# Patient Record
Sex: FEMALE | Race: WHITE | NOT HISPANIC OR LATINO | Employment: OTHER | URBAN - METROPOLITAN AREA
[De-identification: names, ages, dates, MRNs, and addresses within clinical notes are randomized per-mention and may not be internally consistent; named-entity substitution may affect disease eponyms.]

---

## 2017-01-18 ENCOUNTER — HOSPITAL ENCOUNTER (OUTPATIENT)
Dept: RADIOLOGY | Facility: MEDICAL CENTER | Age: 77
End: 2017-01-18
Attending: INTERNAL MEDICINE
Payer: MEDICARE

## 2017-01-18 DIAGNOSIS — R92.8 ABNORMAL MAMMOGRAM OF LEFT BREAST: ICD-10-CM

## 2017-01-18 PROCEDURE — G0206 DX MAMMO INCL CAD UNI: HCPCS | Mod: LT

## 2017-01-30 ENCOUNTER — OFFICE VISIT (OUTPATIENT)
Dept: MEDICAL GROUP | Facility: MEDICAL CENTER | Age: 77
End: 2017-01-30
Payer: MEDICARE

## 2017-01-30 VITALS
HEIGHT: 64 IN | WEIGHT: 143 LBS | DIASTOLIC BLOOD PRESSURE: 88 MMHG | BODY MASS INDEX: 24.41 KG/M2 | SYSTOLIC BLOOD PRESSURE: 134 MMHG | RESPIRATION RATE: 16 BRPM | HEART RATE: 69 BPM | OXYGEN SATURATION: 94 % | TEMPERATURE: 98.2 F

## 2017-01-30 DIAGNOSIS — J41.0 SIMPLE CHRONIC BRONCHITIS (HCC): ICD-10-CM

## 2017-01-30 DIAGNOSIS — M85.80 OSTEOPENIA: ICD-10-CM

## 2017-01-30 DIAGNOSIS — E55.9 VITAMIN D DEFICIENCY: ICD-10-CM

## 2017-01-30 PROCEDURE — 99213 OFFICE O/P EST LOW 20 MIN: CPT | Performed by: INTERNAL MEDICINE

## 2017-01-30 PROCEDURE — 1101F PT FALLS ASSESS-DOCD LE1/YR: CPT | Performed by: INTERNAL MEDICINE

## 2017-01-30 PROCEDURE — 4040F PNEUMOC VAC/ADMIN/RCVD: CPT | Mod: 8P | Performed by: INTERNAL MEDICINE

## 2017-01-30 PROCEDURE — G8420 CALC BMI NORM PARAMETERS: HCPCS | Performed by: INTERNAL MEDICINE

## 2017-01-30 PROCEDURE — G8484 FLU IMMUNIZE NO ADMIN: HCPCS | Performed by: INTERNAL MEDICINE

## 2017-01-30 PROCEDURE — 4004F PT TOBACCO SCREEN RCVD TLK: CPT | Performed by: INTERNAL MEDICINE

## 2017-01-30 PROCEDURE — G8432 DEP SCR NOT DOC, RNG: HCPCS | Performed by: INTERNAL MEDICINE

## 2017-01-30 RX ORDER — SIMVASTATIN 40 MG
TABLET ORAL
Qty: 90 TAB | Refills: 3 | Status: SHIPPED | OUTPATIENT
Start: 2017-01-30 | End: 2018-05-14 | Stop reason: SDUPTHER

## 2017-01-30 NOTE — MR AVS SNAPSHOT
"        Cheli Parks   2017 10:40 AM   Office Visit   MRN: [id number]    Department:  [address] [place] [address]   Dept Phone:  [phone]    Description:  Female : 1940   Provider:  Javier Slaughter M.D.           Reason for Visit     Follow-Up 6 month check up. Dexa scan results      Allergies as of 2017     Allergen Noted Reactions    Nkda [No Known Drug Allergy] 2009         You were diagnosed with     Osteopenia   [361403]       Simple chronic bronchitis (CMS-HCC)   [491.0.ICD-9-CM]       Vitamin D deficiency   [1144280]         Vital Signs     Blood Pressure Pulse Temperature Respirations Height Weight    134/88 mmHg 69 36.8 °C (98.2 °F) 16 1.626 m (5' 4.02\") 64.864 kg (143 lb)    Body Mass Index Oxygen Saturation Smoking Status             24.53 kg/m2 94% Current Every Day Smoker         Basic Information     Date Of Birth Sex Race Ethnicity Preferred Language    1940 Female White Non- English      Your appointments     May 01, 2017 10:40 AM   NEW TO YOU with Danny Buchanan M.D.   [facility] [address]    [address] [facility] [address]  [facility] [id number] [phone]              Problem List              ICD-10-CM Priority Class Noted - Resolved    CATARACT    Unknown - Present    Tobacco abuse Z72.0   Unknown - Present    Hyperlipidemia E78.5   Unknown - Present    COPD (chronic obstructive pulmonary disease) (CMS-HCC) J44.9   2010 - Present    MEDICAL HOME    2012 - Present    Osteopenia M85.80   2014 - Present    Hyperglycemia R73.9   9/3/2015 - Present    Vitamin D deficiency E55.9   2015 - Present      Health Maintenance        Date Due Completion Dates    IMM DTaP/Tdap/Td Vaccine (1 - Tdap) 1958 ---    IMM ZOSTER VACCINE 1999 ---    IMM PNEUMOCOCCAL 65+ (ADULT) LOW/MEDIUM RISK SERIES (1 of 2 - PCV13) 2004 ---    IMM INFLUENZA (1) 2016 ---    MAMMOGRAM 2018, 2016, 2015, 3/11/2013, " 1/14/2011, 10/19/2009, 10/19/2009    BONE DENSITY 12/19/2021 12/19/2016, 12/12/2013, 8/9/2011    COLONOSCOPY 2/26/2023 2/26/2013 (Declined)    Override on 2/26/2013: Patient Declined            Current Immunizations     No immunizations on file.      Below and/or attached are the medications your provider expects you to take. Review all of your home medications and newly ordered medications with your provider and/or pharmacist. Follow medication instructions as directed by your provider and/or pharmacist. Please keep your medication list with you and share with your provider. Update the information when medications are discontinued, doses are changed, or new medications (including over-the-counter products) are added; and carry medication information at all times in the event of emergency situations     Allergies:  NKDA - (reactions not documented)               Medications  Valid as of: January 30, 2017 - 11:09 AM    Generic Name Brand Name Tablet Size Instructions for use    Calcium Carbonate-Vitamin D (Chew Tab) Calcium Carbonate-Vitamin D 600-400 MG-UNIT Take 1 Tab by mouth 2 Times a Day.          Cholecalciferol (Tab) cholecalciferol 1000 UNIT Take 1,000 Units by mouth every day.        Ibuprofen (Tab) MOTRIN 800 MG Take 800 mg by mouth every 8 hours as needed.        Simvastatin (Tab) ZOCOR 40 MG TAKE 1 TABLET BY MOUTH ONCE DAILY        .                 Medicines prescribed today were sent to:     [facility] [address] [place] [address] [address] [facility]    [address] [place] [address]    Phone: [phone] Fax: [phone]    Open 24 Hours?: No      Medication refill instructions:       If your prescription bottle indicates you have medication refills left, it is not necessary to call your provider’s office. Please contact your pharmacy and they will refill your medication.    If your prescription bottle indicates you do not have any refills left, you may request refills at any time through one of the following  ways: The online [facility] system (except Urgent Care), by calling your provider’s office, or by asking your pharmacy to contact your provider’s office with a refill request. Medication refills are processed only during regular business hours and may not be available until the next business day. Your provider may request additional information or to have a follow-up visit with you prior to refilling your medication.   *Please Note: Medication refills are assigned a new Rx number when refilled electronically. Your pharmacy may indicate that no refills were authorized even though a new prescription for the same medication is available at the pharmacy. Please request the medicine by name with the pharmacy before contacting your provider for a refill.        Your To Do List     Future Labs/Procedures Complete By Expires    VITAMIN D,25 HYDROXY  As directed 8/1/2017         [facility] Status: Patient Declined

## 2017-01-30 NOTE — PROGRESS NOTES
"Cheli is a 76 y.o. female who is here today because    Chief Complaint   Patient presents with   • Follow-Up     6 month check up. Dexa scan results       The patient's current problem list and medication list is:    Patient Active Problem List    Diagnosis Date Noted   • Vitamin D deficiency 12/23/2015   • Hyperglycemia 09/03/2015   • Osteopenia 05/28/2014   • MEDICAL HOME 11/09/2012   • COPD (chronic obstructive pulmonary disease) (CMS-HCC) 06/22/2010   • CATARACT    • Tobacco abuse    • Hyperlipidemia        Current Outpatient Prescriptions   Medication Sig Dispense Refill   • simvastatin (ZOCOR) 40 MG Tab TAKE 1 TABLET BY MOUTH ONCE DAILY 90 Tab 3   • vitamin D (CHOLECALCIFEROL) 1000 UNIT Tab Take 1,000 Units by mouth every day.     • ibuprofen (MOTRIN) 800 MG Tab Take 800 mg by mouth every 8 hours as needed.     • Calcium Carbonate-Vitamin D (CALTRATE 600+D) 600-400 MG-UNIT CHEW Take 1 Tab by mouth 2 Times a Day.         No current facility-administered medications for this visit.     HPI  Cheli returns after 3 months for follow up. Diagnoses of Osteopenia, Simple chronic bronchitis (CMS-HCC), and Vitamin D deficiency were pertinent to this visit.    1. Osteopenia  No falls, no fractures. Takes care in walking.     2. Simple chronic bronchitis (CMS-[facility])  Still smoking. Has am cough and sputum but these do not continue during the day. Reports no GRULLON, no hemoptysis. Decline chest CT cancer screening program. Not interested in having PFTs.     3. Vitamin D deficiency  Last vitamin D level was 28 in 2015. She is now taking 1400 vitamin D units bid.       ROS Denies chest pain, shortness of breath, changes in bowel and bladder function, lower extremity edema.    Allergies as of 01/30/2017 - Joss as Reviewed 01/30/2017   Allergen Reaction Noted   • Nkda [no known drug allergy]  09/22/2009      /88 mmHg  Pulse 69  Temp(Src) 36.8 °C (98.2 °F)  Resp 16  Ht 1.626 m (5' 4.02\")  Wt 64.864 kg (143 lb)  BMI 24.53 " kg/m2  SpO2 94%    PHYSICAL EXAMINATION  Gen:         Alert and oriented, No apparent distress.  Neck:       No Jugular venous distension, Lymphadenopathy, Thyromegaly, Bruits.  Lungs:     Clear to auscultation bilaterally  CV:          Regular rate and rhythm. No murmurs heard  Abd:         Soft, non distended.                    Ext:          No clubbing, cyanosis, edema.    ASSESSMENT AND PLAN     76 y.o. female     1. Osteopenia  Stable. Continue with vitamin D and calcium supplements    2. Simple chronic bronchitis (CMS-HCC)  Encouraged to consider smoking cessation    3. Vitamin D deficiency  Continue with current supplement  - VITAMIN D,25 HYDROXY; Future      Followup: 3 months, Dr. Buchanan

## 2017-04-11 ENCOUNTER — OFFICE VISIT (OUTPATIENT)
Dept: URGENT CARE | Facility: CLINIC | Age: 77
End: 2017-04-11
Payer: MEDICARE

## 2017-04-11 VITALS
RESPIRATION RATE: 16 BRPM | TEMPERATURE: 98.6 F | OXYGEN SATURATION: 96 % | HEART RATE: 86 BPM | WEIGHT: 143 LBS | DIASTOLIC BLOOD PRESSURE: 90 MMHG | SYSTOLIC BLOOD PRESSURE: 160 MMHG | BODY MASS INDEX: 24.41 KG/M2 | HEIGHT: 64 IN

## 2017-04-11 DIAGNOSIS — J44.1 COPD EXACERBATION (HCC): ICD-10-CM

## 2017-04-11 PROCEDURE — 94640 AIRWAY INHALATION TREATMENT: CPT | Performed by: PHYSICIAN ASSISTANT

## 2017-04-11 PROCEDURE — 4004F PT TOBACCO SCREEN RCVD TLK: CPT | Performed by: PHYSICIAN ASSISTANT

## 2017-04-11 PROCEDURE — G8420 CALC BMI NORM PARAMETERS: HCPCS | Performed by: PHYSICIAN ASSISTANT

## 2017-04-11 PROCEDURE — 99214 OFFICE O/P EST MOD 30 MIN: CPT | Mod: 25 | Performed by: PHYSICIAN ASSISTANT

## 2017-04-11 PROCEDURE — 4040F PNEUMOC VAC/ADMIN/RCVD: CPT | Mod: 8P | Performed by: PHYSICIAN ASSISTANT

## 2017-04-11 PROCEDURE — 1101F PT FALLS ASSESS-DOCD LE1/YR: CPT | Performed by: PHYSICIAN ASSISTANT

## 2017-04-11 PROCEDURE — G8432 DEP SCR NOT DOC, RNG: HCPCS | Performed by: PHYSICIAN ASSISTANT

## 2017-04-11 RX ORDER — PROMETHAZINE HYDROCHLORIDE AND CODEINE PHOSPHATE 6.25; 1 MG/5ML; MG/5ML
5 SYRUP ORAL 4 TIMES DAILY PRN
Qty: 120 ML | Refills: 0 | Status: SHIPPED | OUTPATIENT
Start: 2017-04-11 | End: 2017-04-18

## 2017-04-11 RX ORDER — ALBUTEROL SULFATE 2.5 MG/3ML
2.5 SOLUTION RESPIRATORY (INHALATION) ONCE
Status: COMPLETED | OUTPATIENT
Start: 2017-04-11 | End: 2017-04-11

## 2017-04-11 RX ORDER — ALBUTEROL SULFATE 90 UG/1
2 AEROSOL, METERED RESPIRATORY (INHALATION) EVERY 4 HOURS PRN
Qty: 1 INHALER | Refills: 0 | Status: SHIPPED | OUTPATIENT
Start: 2017-04-11 | End: 2017-05-30 | Stop reason: SDUPTHER

## 2017-04-11 RX ORDER — AZITHROMYCIN 250 MG/1
TABLET, FILM COATED ORAL
Qty: 6 TAB | Refills: 0 | Status: SHIPPED | OUTPATIENT
Start: 2017-04-11 | End: 2017-04-20

## 2017-04-11 RX ADMIN — ALBUTEROL SULFATE 2.5 MG: 2.5 SOLUTION RESPIRATORY (INHALATION) at 10:36

## 2017-04-11 ASSESSMENT — ENCOUNTER SYMPTOMS
SHORTNESS OF BREATH: 1
DIZZINESS: 0
COUGH: 1
EYES NEGATIVE: 1
SPUTUM PRODUCTION: 1
CARDIOVASCULAR NEGATIVE: 1
HEADACHES: 1
BACK PAIN: 1
PSYCHIATRIC NEGATIVE: 1
VOMITING: 0
WHEEZING: 1
NAUSEA: 0

## 2017-04-11 NOTE — MR AVS SNAPSHOT
"        Cheli Parks   2017 9:30 AM   Office Visit   MRN: [id number]    Department:  [facility]   Dept Phone:  [phone]    Description:  Female : 1940   Provider:  Ulysses Monteiro PA-C           Reason for Visit     Cough x 3 days/very bad cough/body ache      Allergies as of 2017     Allergen Noted Reactions    Nkda [No Known Drug Allergy] 2009         You were diagnosed with     COPD exacerbation (CMS-HCC)   [477361]         Vital Signs     Blood Pressure Pulse Temperature Respirations Height Weight    160/90 mmHg 86 37 °C (98.6 °F) 16 1.626 m (5' 4.02\") 64.864 kg (143 lb)    Body Mass Index Oxygen Saturation Breastfeeding? Smoking Status          24.53 kg/m2 96% No Current Every Day Smoker        Basic Information     Date Of Birth Sex Race Ethnicity Preferred Language    1940 Female White Non- English      Your appointments     May 01, 2017 10:40 AM   NEW TO YOU with Danny Buchanan M.D.   [facility] [address]    [address]  [facility] [address]  [facility] [address]   [phone]              Problem List              ICD-10-CM Priority Class Noted - Resolved    CATARACT    Unknown - Present    Tobacco abuse Z72.0   Unknown - Present    Hyperlipidemia E78.5   Unknown - Present    COPD (chronic obstructive pulmonary disease) (CMS-HCC) J44.9   2010 - Present    MEDICAL HOME    2012 - Present    Osteopenia M85.80   2014 - Present    Hyperglycemia R73.9   9/3/2015 - Present    Vitamin D deficiency E55.9   2015 - Present      Health Maintenance        Date Due Completion Dates    IMM DTaP/Tdap/Td Vaccine (1 - Tdap) 1958 ---    IMM ZOSTER VACCINE 1999 ---    IMM PNEUMOCOCCAL 65+ (ADULT) LOW/MEDIUM RISK SERIES (1 of 2 - PCV13) 2004 ---    MAMMOGRAM 2018, 2016, 2015, 3/11/2013, 2011, 10/19/2009, 10/19/2009    BONE DENSITY 2021, 2013, 2011    COLONOSCOPY " 2/26/2023 2/26/2013 (Declined)    Override on 2/26/2013: Patient Declined            Current Immunizations     No immunizations on file.      Below and/or attached are the medications your provider expects you to take. Review all of your home medications and newly ordered medications with your provider and/or pharmacist. Follow medication instructions as directed by your provider and/or pharmacist. Please keep your medication list with you and share with your provider. Update the information when medications are discontinued, doses are changed, or new medications (including over-the-counter products) are added; and carry medication information at all times in the event of emergency situations     Allergies:  NKDA - (reactions not documented)               Medications  Valid as of: April 11, 2017 - 11:19 AM    Generic Name Brand Name Tablet Size Instructions for use    Albuterol Sulfate (Aero Soln) albuterol 108 (90 BASE) MCG/ACT Inhale 2 Puffs by mouth every four hours as needed for Shortness of Breath.        Azithromycin (Tab) ZITHROMAX 250 MG 2 tablets on day one, then 1 tablet PO daily until done.        Calcium Carbonate-Vitamin D (Chew Tab) Calcium Carbonate-Vitamin D 600-400 MG-UNIT Take 1 Tab by mouth 2 Times a Day.          Cholecalciferol (Tab) cholecalciferol 1000 UNIT Take 1,000 Units by mouth every day.        Ibuprofen (Tab) MOTRIN 800 MG Take 800 mg by mouth every 8 hours as needed.        Promethazine-Codeine (Syrup) PHENERGAN-CODEINE 6.25-10 MG/5ML Take 5 mL by mouth 4 times a day as needed for Cough for up to 7 days.        Simvastatin (Tab) ZOCOR 40 MG TAKE 1 TABLET BY MOUTH ONCE DAILY        .                 Medicines prescribed today were sent to:     [facility] [address] [place] [address] [place] - [address]    [address] [facility] [place] [address]    Phone: [phone] Fax: [phone]    Open 24 Hours?: No      Medication refill instructions:       If your prescription bottle indicates you have medication  refills left, it is not necessary to call your provider’s office. Please contact your pharmacy and they will refill your medication.    If your prescription bottle indicates you do not have any refills left, you may request refills at any time through one of the following ways: The online [employer] system (except Urgent Care), by calling your provider’s office, or by asking your pharmacy to contact your provider’s office with a refill request. Medication refills are processed only during regular business hours and may not be available until the next business day. Your provider may request additional information or to have a follow-up visit with you prior to refilling your medication.   *Please Note: Medication refills are assigned a new Rx number when refilled electronically. Your pharmacy may indicate that no refills were authorized even though a new prescription for the same medication is available at the pharmacy. Please request the medicine by name with the pharmacy before contacting your provider for a refill.        Instructions    Nasal saline irrigation (netti pot or Toño Med Sinus Rinse).  Used distilled water or boiled tap water with nasal flushes, not straight tap water.  Humidifier at bedtime.  Hot steam showers to loosen up mucous.  Cough medicine at bedtime.  Lots of fluids, tea with honey.  Ibuprofen for headache, fever, chills.  Be sure to take with food.  Return if worsening: Yellow thicker mucus changes, worsening pain around the eyes and radiates to the teeth, and fever over 101°F.    Chronic Obstructive Pulmonary Disease Exacerbation  Chronic obstructive pulmonary disease (COPD) is a common lung condition in which airflow from the lungs is limited. COPD is a general term that can be used to describe many different lung problems that limit airflow, including chronic bronchitis and emphysema. COPD exacerbations are episodes when breathing symptoms become much worse and require extra treatment.  Without treatment, COPD exacerbations can be life threatening, and frequent COPD exacerbations can cause further damage to your lungs.  CAUSES  · Respiratory infections.  · Exposure to smoke.  · Exposure to air pollution, chemical fumes, or dust.  Sometimes there is no apparent cause or trigger.  RISK FACTORS  · Smoking cigarettes.  · Older age.  · Frequent prior COPD exacerbations.  SIGNS AND SYMPTOMS  · Increased coughing.  · Increased thick spit (sputum) production.  · Increased wheezing.  · Increased shortness of breath.  · Rapid breathing.  · Chest tightness.  DIAGNOSIS  Your medical history, a physical exam, and tests will help your health care provider make a diagnosis. Tests may include:  · A chest X-ray.  · Basic lab tests.  · Sputum testing.  · An arterial blood gas test.  TREATMENT  Depending on the severity of your COPD exacerbation, you may need to be admitted to a hospital for treatment. Some of the treatments commonly used to treat COPD exacerbations are:   · Antibiotic medicines.  · Bronchodilators. These are drugs that expand the air passages. They may be given with an inhaler or nebulizer. Spacer devices may be needed to help improve drug delivery.  · Corticosteroid medicines.  · Supplemental oxygen therapy.  · Airway clearing techniques, such as noninvasive ventilation (NIV) and positive expiratory pressure (PEP). These provide respiratory support through a mask or other noninvasive device.  HOME CARE INSTRUCTIONS  · Do not smoke. Quitting smoking is very important to prevent COPD from getting worse and exacerbations from happening as often.  · Avoid exposure to all substances that irritate the airway, especially to tobacco smoke.  · If you were prescribed an antibiotic medicine, finish it all even if you start to feel better.  · Take all medicines as directed by your health care provider. It is important to use correct technique with inhaled medicines.  · Drink enough fluids to keep your urine  clear or pale yellow (unless you have a medical condition that requires fluid restriction).  · Use a cool mist vaporizer. This makes it easier to clear your chest when you cough.  · If you have a home nebulizer and oxygen, continue to use them as directed.  · Maintain all necessary vaccinations to prevent infections.  · Exercise regularly.  · Eat a healthy diet.  · Keep all follow-up appointments as directed by your health care provider.  SEEK IMMEDIATE MEDICAL CARE IF:  · You have worsening shortness of breath.  · You have trouble talking.  · You have severe chest pain.  · You have blood in your sputum.  · You have a fever.  · You have weakness, vomit repeatedly, or faint.  · You feel confused.  · You continue to get worse.  MAKE SURE YOU:  · Understand these instructions.  · Will watch your condition.  · Will get help right away if you are not doing well or get worse.     This information is not intended to replace advice given to you by your health care provider. Make sure you discuss any questions you have with your health care provider.     Document Released: 10/14/2008 Document Revised: 01/08/2016 Document Reviewed: 08/22/2014  [employer] Interactive Patient Education ©2016 Elsevier Inc.               MyChart Status: Patient Declined        Quit Tobacco Information     Do you want to quit using tobacco?    Quitting tobacco decreases risks of cancer, heart and lung disease, increases life expectancy, improves sense of taste and smell, and increases spending money, among other benefits.    If you are thinking about quitting, we can help.  • [facility] Quit Tobacco Program: 264.456.3265  o Program occurs weekly for four weeks and includes pharmacist consultation on products to support quitting smoking or chewing tobacco. A provider referral is needed for pharmacist consultation.  • Tobacco Users Help Hotline: 4-050-QUIT-NOW (916-3673) or https://[place].quitlogix.org/  o Free, confidential telephone and online coaching  for [place] residents. Sessions are designed on a schedule that is convenient for you. Eligible clients receive free nicotine replacement therapy.  • Nationally: www.smokefree.gov  o Information and professional assistance to support both immediate and long-term needs as you become, and remain, a non-smoker. Smokefree.gov allows you to choose the help that best fits your needs.

## 2017-04-11 NOTE — PATIENT INSTRUCTIONS
Nasal saline irrigation (netti pot or Toño Med Sinus Rinse).  Used distilled water or boiled tap water with nasal flushes, not straight tap water.  Humidifier at bedtime.  Hot steam showers to loosen up mucous.  Cough medicine at bedtime.  Lots of fluids, tea with honey.  Ibuprofen for headache, fever, chills.  Be sure to take with food.  Return if worsening: Yellow thicker mucus changes, worsening pain around the eyes and radiates to the teeth, and fever over 101°F.    Chronic Obstructive Pulmonary Disease Exacerbation  Chronic obstructive pulmonary disease (COPD) is a common lung condition in which airflow from the lungs is limited. COPD is a general term that can be used to describe many different lung problems that limit airflow, including chronic bronchitis and emphysema. COPD exacerbations are episodes when breathing symptoms become much worse and require extra treatment. Without treatment, COPD exacerbations can be life threatening, and frequent COPD exacerbations can cause further damage to your lungs.  CAUSES  · Respiratory infections.  · Exposure to smoke.  · Exposure to air pollution, chemical fumes, or dust.  Sometimes there is no apparent cause or trigger.  RISK FACTORS  · Smoking cigarettes.  · Older age.  · Frequent prior COPD exacerbations.  SIGNS AND SYMPTOMS  · Increased coughing.  · Increased thick spit (sputum) production.  · Increased wheezing.  · Increased shortness of breath.  · Rapid breathing.  · Chest tightness.  DIAGNOSIS  Your medical history, a physical exam, and tests will help your health care provider make a diagnosis. Tests may include:  · A chest X-ray.  · Basic lab tests.  · Sputum testing.  · An arterial blood gas test.  TREATMENT  Depending on the severity of your COPD exacerbation, you may need to be admitted to a hospital for treatment. Some of the treatments commonly used to treat COPD exacerbations are:   · Antibiotic medicines.  · Bronchodilators. These are drugs that expand  the air passages. They may be given with an inhaler or nebulizer. Spacer devices may be needed to help improve drug delivery.  · Corticosteroid medicines.  · Supplemental oxygen therapy.  · Airway clearing techniques, such as noninvasive ventilation (NIV) and positive expiratory pressure (PEP). These provide respiratory support through a mask or other noninvasive device.  HOME CARE INSTRUCTIONS  · Do not smoke. Quitting smoking is very important to prevent COPD from getting worse and exacerbations from happening as often.  · Avoid exposure to all substances that irritate the airway, especially to tobacco smoke.  · If you were prescribed an antibiotic medicine, finish it all even if you start to feel better.  · Take all medicines as directed by your health care provider. It is important to use correct technique with inhaled medicines.  · Drink enough fluids to keep your urine clear or pale yellow (unless you have a medical condition that requires fluid restriction).  · Use a cool mist vaporizer. This makes it easier to clear your chest when you cough.  · If you have a home nebulizer and oxygen, continue to use them as directed.  · Maintain all necessary vaccinations to prevent infections.  · Exercise regularly.  · Eat a healthy diet.  · Keep all follow-up appointments as directed by your health care provider.  SEEK IMMEDIATE MEDICAL CARE IF:  · You have worsening shortness of breath.  · You have trouble talking.  · You have severe chest pain.  · You have blood in your sputum.  · You have a fever.  · You have weakness, vomit repeatedly, or faint.  · You feel confused.  · You continue to get worse.  MAKE SURE YOU:  · Understand these instructions.  · Will watch your condition.  · Will get help right away if you are not doing well or get worse.     This information is not intended to replace advice given to you by your health care provider. Make sure you discuss any questions you have with your health care provider.      Document Released: 10/14/2008 Document Revised: 01/08/2016 Document Reviewed: 08/22/2014  [employer] Interactive Patient Education ©2016 [employer]vier Inc.

## 2017-04-11 NOTE — PROGRESS NOTES
Subjective:      Cheli Parks is a 76 y.o. female who presents with Cough            Cough  Associated symptoms include headaches, shortness of breath and wheezing.     Chief Complaint   Patient presents with   • Cough     x 3 days/very bad cough/body ache       HPI:  Cheli Parks is a 76 y.o. female who presents with 3 days of worsening cough and body aches.  Has tried delysm and robitussen with not improvement.  Has COPD not on inhalers.  Having thicker white mucous.  Lots fatigue.  No fever, but normally doesn't get fever. Patient denies HA, SOB, chest pain, palpitations, fever, chills, or n/v/d.      Past Medical History   Diagnosis Date   • Tobacco abuse    • Hyperlipidemia    • CATARACT      Left   • MEDICAL HOME 11/9/2012   • Arthritis        Past Surgical History   Procedure Laterality Date   • Abdominal hysterectomy total         Family History   Problem Relation Age of Onset   • Heart Disease Father 87     MI   • Lung Disease Father      emphazema   • Cancer Paternal Aunt      Breast Cancer   • Arthritis Mother    • Lung Disease Sister      COPD   • Diabetes Sister    • Cancer Maternal Grandfather      throat cancer   • Dementia Paternal Grandmother    • Cancer Paternal Grandfather        Social History     Social History   • Marital Status:      Spouse Name: N/A   • Number of Children: N/A   • Years of Education: N/A     Occupational History   • Not on file.     Social History Main Topics   • Smoking status: Current Every Day Smoker -- 1.00 packs/day for 50 years     Types: Cigarettes   • Smokeless tobacco: Current User   • Alcohol Use: Yes      Comment: occasionally   • Drug Use: No   • Sexual Activity: No     Other Topics Concern   • Not on file     Social History Narrative         Current outpatient prescriptions:   •  simvastatin, TAKE 1 TABLET BY MOUTH ONCE DAILY, 4/11/2017  •  vitamin D, 1,000 Units, Oral, DAILY, 4/11/2017  •  Calcium Carbonate-Vitamin D, 1 Tab, Oral, BID, 4/11/2017  •   "ibuprofen, 800 mg, Oral, Q8HRS PRN, prn    Allergies   Allergen Reactions   • Nkda [No Known Drug Allergy]             Review of Systems   Constitutional: Positive for malaise/fatigue.   HENT: Negative for congestion.    Eyes: Negative.    Respiratory: Positive for cough, sputum production, shortness of breath and wheezing.    Cardiovascular: Negative.    Gastrointestinal: Negative for nausea and vomiting.   Genitourinary: Negative.    Musculoskeletal: Positive for back pain.   Skin: Negative.    Neurological: Positive for headaches. Negative for dizziness.   Endo/Heme/Allergies: Negative.    Psychiatric/Behavioral: Negative.           Objective:     /90 mmHg  Pulse 86  Temp(Src) 37 °C (98.6 °F)  Resp 16  Ht 1.626 m (5' 4.02\")  Wt 64.864 kg (143 lb)  BMI 24.53 kg/m2  SpO2 90%  Breastfeeding? No     Physical Exam       Nursing note and vitals reviewed.    Constitutional:   Appropriately groomed, pleasant affect, well nourished, in NAD.    Head:   Normocephalic, atraumatic.    Eyes:   PERRLA, EOM's full, sclera white, conjunctiva not erythematous, and medial canthus without exudate bilaterally.    Ears:  Auricle and tragus non-tender to manipulation.  No pre-auricular lymphadenopathy or mastoid ttp.  EACs with mild cerumen bilaterally, not erythematous.  TM’s pearly gray with cone of light present and umbo and malleolus visible bilaterally.  No bulging or fluid bubbles present in middle ear.  Hearing grossly intact to voice.    Nose:  Nares not patent bilaterally.  Nasal mucosa edematous with white rhinorrhea bilaterally.  Mild sinus tenderness to percussion.    Throat:  Dentition wnl, mucosa moist without lesions.  Oropharynx mildly erythematous, with no enlargement of the palatine tonsils bilaterally with no exudates.    Post nasal drainage  present.  Soft palate rises symmetrically bilaterally and uvula midline.      Neck: Neck supple, with mild anterior lymphadenopathy that is soft and mobile to " palpation. Thyroid non-palpable without tenderness or nodules. No supraclavicular lymphadenopathy.    Lungs:  Respiratory effort not labored without accessory muscle use.  Lungs with inspiratory and expiratory wheezes to auscultation. Overall lung sounds tight and distant. No rales. Rhonchi cleared with cough.    Heart:  RRR, without murmurs rubs or gallops.  Radial and dorsalis pedis pulse 2+ bilaterally.  No LE edema.    Musculoskeletal:  Gait non-antalgic with a narrow base.    Derm:  Skin without rashes or lesions with good turgor pressure.      Psychiatric:  Mood, affect, and judgement appropriate.       Assessment/Plan:     1. COPD exacerbation (CMS-[facility])  Suspect viral etiology at this time causing COPD exacerbation. Her exam patient does have significant wheezes to auscultation with distant lung sounds throughout. Strong cigarette odor. Recommended symptoms support measures such as nasal saline irrigation pushing fluids. Did prescribe albuterol inhaler as post nebulizer oxygen saturation increased to 96% room air from 90% with better lung sounds throughout. Did recommend steroid pulse with taper call the patient deferred. Cutting cough syrup for bedtime use only. Azithromycin for concern of worsening lower respiratory tract infection given patient COPD not currently on therapy and concurrent heavy cigarette use.     Narcotic use report obtained with no indication of narcotic overuse or misuse and deemed necessary for treatment. Sedation, dependence, and constipation precautions given. Avoid use while driving or operating heavy machinery.     Patient was in agreement with this treatment plan and seemed to understand without barriers. All questions were encouraged and answered.  Reviewed signs and symptoms of when to seek emergency medical care.     Please note that this dictation was created using voice recognition software.  I have made every reasonable attempt to correct obvious errors, but I expect there  are errors of rogerio and possibly content that I did not discover before finalizing the note.     - albuterol (PROVENTIL) 2.5mg/3ml nebulizer solution 2.5 mg; 3 mL by Nebulization route Once.  - promethazine-codeine (PHENERGAN-CODEINE) 6.25-10 MG/5ML Syrup; Take 5 mL by mouth 4 times a day as needed for Cough for up to 7 days.  Dispense: 120 mL; Refill: 0  - azithromycin (ZITHROMAX) 250 MG Tab; 2 tablets on day one, then 1 tablet PO daily until done.  Dispense: 6 Tab; Refill: 0  - albuterol 108 (90 BASE) MCG/ACT Aero Soln inhalation aerosol; Inhale 2 Puffs by mouth every four hours as needed for Shortness of Breath.  Dispense: 1 Inhaler; Refill: 0

## 2017-04-20 ENCOUNTER — OFFICE VISIT (OUTPATIENT)
Dept: URGENT CARE | Facility: CLINIC | Age: 77
End: 2017-04-20
Payer: MEDICARE

## 2017-04-20 ENCOUNTER — APPOINTMENT (OUTPATIENT)
Dept: RADIOLOGY | Facility: IMAGING CENTER | Age: 77
End: 2017-04-20
Attending: PHYSICIAN ASSISTANT
Payer: MEDICARE

## 2017-04-20 VITALS
TEMPERATURE: 97.5 F | HEIGHT: 64 IN | BODY MASS INDEX: 24.41 KG/M2 | SYSTOLIC BLOOD PRESSURE: 142 MMHG | DIASTOLIC BLOOD PRESSURE: 78 MMHG | WEIGHT: 143 LBS | RESPIRATION RATE: 16 BRPM | HEART RATE: 71 BPM | OXYGEN SATURATION: 95 %

## 2017-04-20 DIAGNOSIS — J18.9 PNEUMONIA OF RIGHT LOWER LOBE DUE TO INFECTIOUS ORGANISM: Primary | ICD-10-CM

## 2017-04-20 DIAGNOSIS — J06.9 URI WITH COUGH AND CONGESTION: ICD-10-CM

## 2017-04-20 DIAGNOSIS — J40 BRONCHITIS: ICD-10-CM

## 2017-04-20 PROCEDURE — 1101F PT FALLS ASSESS-DOCD LE1/YR: CPT | Mod: 8P | Performed by: PHYSICIAN ASSISTANT

## 2017-04-20 PROCEDURE — 71020 DX-CHEST-2 VIEWS: CPT | Mod: TC | Performed by: PHYSICIAN ASSISTANT

## 2017-04-20 PROCEDURE — G8432 DEP SCR NOT DOC, RNG: HCPCS | Performed by: PHYSICIAN ASSISTANT

## 2017-04-20 PROCEDURE — 99214 OFFICE O/P EST MOD 30 MIN: CPT | Performed by: PHYSICIAN ASSISTANT

## 2017-04-20 PROCEDURE — 4004F PT TOBACCO SCREEN RCVD TLK: CPT | Performed by: PHYSICIAN ASSISTANT

## 2017-04-20 PROCEDURE — G8420 CALC BMI NORM PARAMETERS: HCPCS | Performed by: PHYSICIAN ASSISTANT

## 2017-04-20 PROCEDURE — 4040F PNEUMOC VAC/ADMIN/RCVD: CPT | Mod: 8P | Performed by: PHYSICIAN ASSISTANT

## 2017-04-20 RX ORDER — LEVOFLOXACIN 500 MG/1
500 TABLET, FILM COATED ORAL DAILY
Qty: 10 TAB | Refills: 0 | Status: SHIPPED | OUTPATIENT
Start: 2017-04-20 | End: 2017-04-30

## 2017-04-20 ASSESSMENT — COPD QUESTIONNAIRES: COPD: 1

## 2017-04-20 ASSESSMENT — ENCOUNTER SYMPTOMS: COUGH: 1

## 2017-04-20 NOTE — MR AVS SNAPSHOT
"        Cheli Parks   2017 11:20 AM   Office Visit   MRN: [id number]    Department:  [facility]   Dept Phone:  [phone]    Description:  Female : 1940   Provider:  Manpreet Mendez PA-C           Reason for Visit     Cough productive cough x 2 weeks w/vomiting s/p \"too much coughing\"      Allergies as of 2017     Allergen Noted Reactions    Nkda [No Known Drug Allergy] 2009         You were diagnosed with     Pneumonia of right lower lobe due to infectious organism   [[id number]]  -  Primary     URI with cough and congestion   [[id number]]         Vital Signs     Blood Pressure Pulse Temperature Respirations Height Weight    142/78 mmHg 71 36.4 °C (97.5 °F) 16 1.626 m (5' 4.02\") 64.864 kg (143 lb)    Body Mass Index Oxygen Saturation Breastfeeding? Smoking Status          24.53 kg/m2 95% No Current Every Day Smoker        Basic Information     Date Of Birth Sex Race Ethnicity Preferred Language    1940 Female White Non- English      Your appointments     May 01, 2017 10:40 AM   NEW TO YOU with Danny Buchanan M.D.   [facility] [address]    [address]  [address]  [facility] [address] [phone]              Problem List              ICD-10-CM Priority Class Noted - Resolved    CATARACT    Unknown - Present    Tobacco abuse Z72.0   Unknown - Present    Hyperlipidemia E78.5   Unknown - Present    COPD (chronic obstructive pulmonary disease) (CMS-[facility]) J44.9   2010 - Present    MEDICAL HOME    2012 - Present    Osteopenia M85.80   2014 - Present    Hyperglycemia R73.9   9/3/2015 - Present    Vitamin D deficiency E55.9   2015 - Present      Health Maintenance        Date Due Completion Dates    IMM DTaP/Tdap/Td Vaccine (1 - Tdap) 1958 ---    IMM ZOSTER VACCINE 1999 ---    IMM PNEUMOCOCCAL 65+ (ADULT) LOW/MEDIUM RISK SERIES (1 of 2 - PCV13) 2004 ---    MAMMOGRAM 2018, 2016, 2015, " 3/11/2013, 1/14/2011, 10/19/2009, 10/19/2009    BONE DENSITY 12/19/2021 12/19/2016, 12/12/2013, 8/9/2011    COLONOSCOPY 2/26/2023 2/26/2013 (Declined)    Override on 2/26/2013: Patient Declined            Current Immunizations     No immunizations on file.      Below and/or attached are the medications your provider expects you to take. Review all of your home medications and newly ordered medications with your provider and/or pharmacist. Follow medication instructions as directed by your provider and/or pharmacist. Please keep your medication list with you and share with your provider. Update the information when medications are discontinued, doses are changed, or new medications (including over-the-counter products) are added; and carry medication information at all times in the event of emergency situations     Allergies:  NKDA - (reactions not documented)               Medications  Valid as of: April 20, 2017 - 12:31 PM    Generic Name Brand Name Tablet Size Instructions for use    Albuterol Sulfate (Aero Soln) albuterol 108 (90 BASE) MCG/ACT Inhale 2 Puffs by mouth every four hours as needed for Shortness of Breath.        Cholecalciferol (Tab) cholecalciferol 1000 UNIT Take 1,000 Units by mouth every day.        Hydrocod Polst-Chlorphen Polst (Suspension Extended Release) TUSSIONEX 10-8 MG/5ML Take 5 mL by mouth every 12 hours.        Ibuprofen (Tab) MOTRIN 800 MG Take 800 mg by mouth every 8 hours as needed.        LevoFLOXacin (Tab) LEVAQUIN 500 MG Take 1 Tab by mouth every day for 10 days.        Simvastatin (Tab) ZOCOR 40 MG TAKE 1 TABLET BY MOUTH ONCE DAILY        .                 Medicines prescribed today were sent to:     [facility] [address] [place] [address], [place] - [address]    [address] [facility] [place] [address]    Phone: [phone] Fax: [phone]    Open 24 Hours?: No      Medication refill instructions:       If your prescription bottle indicates you have medication refills left, it is not necessary to  call your provider’s office. Please contact your pharmacy and they will refill your medication.    If your prescription bottle indicates you do not have any refills left, you may request refills at any time through one of the following ways: The online [employer] system (except Urgent Care), by calling your provider’s office, or by asking your pharmacy to contact your provider’s office with a refill request. Medication refills are processed only during regular business hours and may not be available until the next business day. Your provider may request additional information or to have a follow-up visit with you prior to refilling your medication.   *Please Note: Medication refills are assigned a new Rx number when refilled electronically. Your pharmacy may indicate that no refills were authorized even though a new prescription for the same medication is available at the pharmacy. Please request the medicine by name with the pharmacy before contacting your provider for a refill.        Your To Do List     Future Labs/Procedures Complete By Expires    DX-CHEST-2 VIEWS  As directed 4/20/2018      Instructions    Pneumonia, Adult  Pneumonia is an infection of the lungs.   CAUSES  Pneumonia may be caused by bacteria or a virus. Usually, the infection is caused by breathing in droplets from an infected person's cough or sneeze.   SYMPTOMS   Symptoms of pneumonia include:  · Cough.  · Fever.  · Chest pain.  · Rapid breathing.  · Shortness of breath.  · Shaking chills.  · Mucus production.  DIAGNOSIS   If you have the common symptoms of pneumonia, often your health care provider will confirm the diagnosis with a chest X-ray. The X-ray will show an abnormality in the lung if you have pneumonia. Other tests may be done on your blood, urine, or mucus (sputum) to find the specific cause of your pneumonia. A blood gas test or pulse oximetry test may be needed to check how well your lungs are working.  TREATMENT   Your treatment  will depend on whether your pneumonia is caused by bacteria or a virus.   · Bacterial pneumonia is treated with antibiotic medicine.  · Pneumonia that is caused by the influenza virus may be treated with an antiviral medicine.  · Pneumonia that is caused by a virus other than influenza will not respond to antibiotic medicine. This type of pneumonia will have to run its course.   HOME CARE INSTRUCTIONS   · Cough suppressants may be used if you are losing too much rest from coughing at night. However, you should try to avoid taking cough suppresants. This is because coughing helps to remove mucus from your lungs.  · Sleep in a semi-upright position at night. Try sleeping in a reclining chair, or place a few pillows under your head.  · Try using a cold steam vaporizer or humidifier in your home or bedroom. This may help loosen your mucus.  · If you were prescribed an antibiotic medicine, finish all of it even if you start to feel better.  · If you were prescribed an expectorant, take it as directed by your health care provider. This medicine loosens the mucus so you can cough it up.  · Take medicines only as directed by your health care provider.  · Do not smoke. If you are a smoker and continue to smoke, your cough may last several weeks after your pneumonia has cleared.  · Get rest when you feel tired, or as needed.  PREVENTION  A pneumococcal shot (vaccine) is available to prevent a common bacterial cause of pneumonia. This is usually suggested for:  · People over 65 years old.  · People on chemotherapy.  · People with chronic lung problems, such as bronchitis or emphysema.  · People with immune system problems.  If you are over 65 years old or have a high risk condition, you may receive the pneumococcal vaccine if you have not received it before. In some countries, a routine influenza vaccine is also recommended. This vaccine can help prevent some cases of pneumonia. You may be offered the influenza vaccine as part  of your care.  If you are a smoker, it is time to quit in order to prevent pneumonia in the future. You may receive instructions on how to stop smoking. Your health care provider can provide medicines and counseling to help you quit.  SEEK MEDICAL CARE IF:  · You have a fever.  · You cannot control your cough with suppressants at night, and you keep losing sleep.  SEEK IMMEDIATE MEDICAL CARE IF:   · You have worsening shortness of breath.  · You have increased chest pain.  · Your sickness becomes worse, especially if you are an older adult or have a weakened immune system.  · You cough up blood.  · You have pain that is getting worse or is not controlled with medicines.  · Your symptoms are getting worse rather than better.     This information is not intended to replace advice given to you by your health care provider. Make sure you discuss any questions you have with your health care provider.     Document Released: 12/18/2006 Document Revised: 01/08/2016 Document Reviewed: 04/13/2016  [employer] Interactive Patient Education ©2016 [employer]            MyChart Status: Patient Declined        Quit Tobacco Information     Do you want to quit using tobacco?    Quitting tobacco decreases risks of cancer, heart and lung disease, increases life expectancy, improves sense of taste and smell, and increases spending money, among other benefits.    If you are thinking about quitting, we can help.  • [facility] Quit Tobacco Program: 828.169.8522  o Program occurs weekly for four weeks and includes pharmacist consultation on products to support quitting smoking or chewing tobacco. A provider referral is needed for pharmacist consultation.  • Tobacco Users Help Hotline: 6-230-QUIT-NOW (495-8613) or https://[place].quitlogix.org/  o Free, confidential telephone and online coaching for [place] residents. Sessions are designed on a schedule that is convenient for you. Eligible clients receive free nicotine replacement  therapy.  • Nationally: www.smokefree.gov  o Information and professional assistance to support both immediate and long-term needs as you become, and remain, a non-smoker. Smokefree.gov allows you to choose the help that best fits your needs.

## 2017-04-20 NOTE — PATIENT INSTRUCTIONS
Pneumonia, Adult  Pneumonia is an infection of the lungs.   CAUSES  Pneumonia may be caused by bacteria or a virus. Usually, the infection is caused by breathing in droplets from an infected person's cough or sneeze.   SYMPTOMS   Symptoms of pneumonia include:  · Cough.  · Fever.  · Chest pain.  · Rapid breathing.  · Shortness of breath.  · Shaking chills.  · Mucus production.  DIAGNOSIS   If you have the common symptoms of pneumonia, often your health care provider will confirm the diagnosis with a chest X-ray. The X-ray will show an abnormality in the lung if you have pneumonia. Other tests may be done on your blood, urine, or mucus (sputum) to find the specific cause of your pneumonia. A blood gas test or pulse oximetry test may be needed to check how well your lungs are working.  TREATMENT   Your treatment will depend on whether your pneumonia is caused by bacteria or a virus.   · Bacterial pneumonia is treated with antibiotic medicine.  · Pneumonia that is caused by the influenza virus may be treated with an antiviral medicine.  · Pneumonia that is caused by a virus other than influenza will not respond to antibiotic medicine. This type of pneumonia will have to run its course.   HOME CARE INSTRUCTIONS   · Cough suppressants may be used if you are losing too much rest from coughing at night. However, you should try to avoid taking cough suppresants. This is because coughing helps to remove mucus from your lungs.  · Sleep in a semi-upright position at night. Try sleeping in a reclining chair, or place a few pillows under your head.  · Try using a cold steam vaporizer or humidifier in your home or bedroom. This may help loosen your mucus.  · If you were prescribed an antibiotic medicine, finish all of it even if you start to feel better.  · If you were prescribed an expectorant, take it as directed by your health care provider. This medicine loosens the mucus so you can cough it up.  · Take medicines only as  directed by your health care provider.  · Do not smoke. If you are a smoker and continue to smoke, your cough may last several weeks after your pneumonia has cleared.  · Get rest when you feel tired, or as needed.  PREVENTION  A pneumococcal shot (vaccine) is available to prevent a common bacterial cause of pneumonia. This is usually suggested for:  · People over 65 years old.  · People on chemotherapy.  · People with chronic lung problems, such as bronchitis or emphysema.  · People with immune system problems.  If you are over 65 years old or have a high risk condition, you may receive the pneumococcal vaccine if you have not received it before. In some countries, a routine influenza vaccine is also recommended. This vaccine can help prevent some cases of pneumonia. You may be offered the influenza vaccine as part of your care.  If you are a smoker, it is time to quit in order to prevent pneumonia in the future. You may receive instructions on how to stop smoking. Your health care provider can provide medicines and counseling to help you quit.  SEEK MEDICAL CARE IF:  · You have a fever.  · You cannot control your cough with suppressants at night, and you keep losing sleep.  SEEK IMMEDIATE MEDICAL CARE IF:   · You have worsening shortness of breath.  · You have increased chest pain.  · Your sickness becomes worse, especially if you are an older adult or have a weakened immune system.  · You cough up blood.  · You have pain that is getting worse or is not controlled with medicines.  · Your symptoms are getting worse rather than better.     This information is not intended to replace advice given to you by your health care provider. Make sure you discuss any questions you have with your health care provider.     Document Released: 12/18/2006 Document Revised: 01/08/2016 Document Reviewed: 04/13/2016  [employer] Interactive Patient Education ©2016 [employer]

## 2017-04-20 NOTE — PROGRESS NOTES
Subjective:      Pt is a 76 y.o. female who presents with Cough            Cough  This is a new problem. The current episode started 1 to 4 weeks ago. The problem has been gradually worsening. The problem occurs constantly. The cough is productive of purulent sputum. The symptoms are aggravated by cold air, exercise and lying down. She has tried OTC cough suppressant for the symptoms. The treatment provided no relief. Her past medical history is significant for asthma, bronchiectasis, bronchitis, COPD and pneumonia.   PT presents to  clinic today complaining of sore throat, fevers, chills, watery eyes, pressure in ears, cough, fatigue, runny nose, wheezing and SOB. PT denies CP, NVD, abdominal pain, joint pain. PT states these symptoms began around 2 weeks ago and that the pt's family has been sick on and off for the last week. Pt has not taken any medications for this condition. PT states the pain is a 7/10 with coughing fits, aching in nature and worse at night.  The pt's medication list, problem list, and allergies have been evaluated and reviewed during today's visit.    PMH:  Past Medical History   Diagnosis Date   • Tobacco abuse    • Hyperlipidemia    • CATARACT      Left   • MEDICAL HOME 2012   • Arthritis        PSH:  Past Surgical History   Procedure Laterality Date   • Abdominal hysterectomy total         Fam Hx:    family history includes Arthritis in her mother; Cancer in her maternal grandfather, paternal aunt, and paternal grandfather; Dementia in her paternal grandmother; Diabetes in her sister; Heart Disease (age of onset: 87) in her father; Lung Disease in her father and sister.  Family Status   Relation Status Death Age   • Father  87     MI   • Mother  97   • Sister  84   • Brother  92   • Brother  80     back aneurysm   • Brother  40   • Son Alive    • Maternal Grandmother     • Maternal Grandfather     • Paternal Grandmother      • Paternal Grandfather         Soc HX:  Social History     Social History   • Marital Status:      Spouse Name: N/A   • Number of Children: N/A   • Years of Education: N/A     Occupational History   • Not on file.     Social History Main Topics   • Smoking status: Current Every Day Smoker -- 1.00 packs/day for 50 years     Types: Cigarettes   • Smokeless tobacco: Current User   • Alcohol Use: 0.0 oz/week     0 Standard drinks or equivalent per week      Comment: occasionally   • Drug Use: No   • Sexual Activity: No     Other Topics Concern   • Not on file     Social History Narrative         Medications:    Current outpatient prescriptions:   •  levofloxacin (LEVAQUIN) 500 MG tablet, Take 1 Tab by mouth every day for 10 days., Disp: 10 Tab, Rfl: 0  •  Hydrocod Polst-CPM Polst ER (TUSSIONEX PENNKINETIC ER) 10-8 MG/5ML Suspension Extended Release, Take 5 mL by mouth every 12 hours., Disp: 140 mL, Rfl: 0  •  simvastatin (ZOCOR) 40 MG Tab, TAKE 1 TABLET BY MOUTH ONCE DAILY, Disp: 90 Tab, Rfl: 3  •  vitamin D (CHOLECALCIFEROL) 1000 UNIT Tab, Take 1,000 Units by mouth every day., Disp: , Rfl:   •  albuterol 108 (90 BASE) MCG/ACT Aero Soln inhalation aerosol, Inhale 2 Puffs by mouth every four hours as needed for Shortness of Breath., Disp: 1 Inhaler, Rfl: 0  •  ibuprofen (MOTRIN) 800 MG Tab, Take 800 mg by mouth every 8 hours as needed., Disp: , Rfl:       Allergies:  Nkda        Review of Systems   Respiratory: Positive for cough.    Constitutional: Positive for chills and malaise/fatigue. Negative for fever and diaphoresis.   HENT: Positive for congestion, ear pain and sore throat. Negative for ear discharge, hearing loss, nosebleeds and tinnitus.    Eyes: Negative for blurred vision, double vision and photophobia.   Respiratory continued: Positive for cough, sputum production, shortness of breath and wheezing. Negative for hemoptysis.    Cardiovascular: Negative for chest pain and  "palpitations.   Gastrointestinal: Negative for nausea, vomiting, abdominal pain, diarrhea and constipation.   Genitourinary: Negative for dysuria and flank pain.   Musculoskeletal: Negative for joint pain and myalgias.   Skin: Negative for itching and rash.   Neurological: Positive for headaches. Negative for dizziness, tingling and weakness.   Endo/Heme/Allergies: Does not bruise/bleed easily.   Psychiatric/Behavioral: Negative for depression. The patient is not nervous/anxious.             Objective:     /78 mmHg  Pulse 71  Temp(Src) 36.4 °C (97.5 °F)  Resp 16  Ht 1.626 m (5' 4.02\")  Wt 64.864 kg (143 lb)  BMI 24.53 kg/m2  SpO2 95%  Breastfeeding? No     Physical Exam       Physical Exam   Constitutional: PT is oriented to person, place, and time. PT appears well-developed and well-nourished. No distress.   HENT:   Head: Normocephalic and atraumatic.   Right Ear: Hearing, tympanic membrane, external ear and ear canal normal.   Left Ear: Hearing, tympanic membrane, external ear and ear canal normal.   Nose: Mucosal edema, rhinorrhea and sinus tenderness present. Right sinus exhibits frontal sinus tenderness. Left sinus exhibits frontal sinus tenderness.   Mouth/Throat: Uvula is midline. Mucous membranes are pale. Posterior oropharyngeal edema and posterior oropharyngeal erythema present. No oropharyngeal exudate.   Eyes: Conjunctivae normal and EOM are normal. Pupils are equal, round, and reactive to light. Right eye exhibits no discharge. Left eye exhibits no discharge.   Neck: Normal range of motion. Neck supple. No thyromegaly present.   Cardiovascular: Normal rate, regular rhythm, normal heart sounds and intact distal pulses.  Exam reveals no gallop and no friction rub.    No murmur heard.  Pulmonary/Chest: Effort normal. No respiratory distress. PT has wheezes. PT has no rales. PT exhibits tenderness.   Abdominal: Soft. Bowel sounds are normal. PT exhibits no distension and no mass. There is no " tenderness. There is no rebound and no guarding.   Musculoskeletal: Normal range of motion. PT exhibits no edema and no tenderness.   Lymphadenopathy:     PT has no cervical adenopathy.   Neurological: Pt is alert and oriented to person, place, and time. Pt has normal reflexes. No cranial nerve deficit.   Skin: Skin is warm and dry. No rash noted. No erythema.   Psychiatric: PT has a normal mood and affect. Pt behavior is normal. Judgment and thought content normal.     RADS:  Narrative        4/20/2017 11:39 AM    HISTORY/REASON FOR EXAM:  Cough.  Productive cough    TECHNIQUE/EXAM DESCRIPTION AND NUMBER OF VIEWS:  Two views of the chest.    COMPARISON:  None.    FINDINGS:  The lungs are hyperinflated consistent with chronic obstructive pulmonary disease.    There is possibly a right lower lobe airspace process.    The cardiac silhouette: normal in size. There is aortic atherosclerosis.    Pleura: There are no pleural effusion or pneumothoraces.    Osseous structures: No significant bony abnormality is present.       Impression        1.  Possible right lower lobe airspace process    2.  chronic obstructive pulmonary disease            Reading Provider Reading Date     Carlos Flores M.D. Apr 20, 2017            Signing Provider Signing Date Signing Time     Carlos Flores M.D. Apr 20, 2017 11:56 AM          Assessment/Plan:     1. Pneumonia of right lower lobe due to infectious organism    - levofloxacin (LEVAQUIN) 500 MG tablet; Take 1 Tab by mouth every day for 10 days.  Dispense: 10 Tab; Refill: 0    2. URI with cough and congestion    - DX-CHEST-2 VIEWS; Future  - Hydrocod Polst-CPM Polst ER (TUSSIONEX PENNKINETIC ER) 10-8 MG/5ML Suspension Extended Release; Take 5 mL by mouth every 12 hours.  Dispense: 140 mL; Refill: 0    STRICT ER precautions discussed  Pt has own inhalers for home and defers steroid therapy  Rest, fluids encouraged.  OTC decongestant for congestion/cough  AVS with medical info given.  Pt  was in full understanding and agreement with the plan.  Follow-up as needed if symptoms worsen or fail to improve.

## 2017-04-28 ENCOUNTER — TELEPHONE (OUTPATIENT)
Dept: MEDICAL GROUP | Facility: MEDICAL CENTER | Age: 77
End: 2017-04-28

## 2017-04-28 NOTE — TELEPHONE ENCOUNTER
Future Appointments       Provider Department Center    5/1/2017 10:40 AM Danny Buchanan M.D. [facility] [address]      NEW PATIENT VISIT PRE-VISIT PLANNING    1.  EpicCare Patient is checked in Patient Demographics? YES    2.  Immunizations were updated in Epic using WebIZ?: No WebIZ record       •  Web Iz Recommendations: None    3.  Patient is due for the following Health Maintenance Topics:   Health Maintenance Due   Topic Date Due   • Annual Wellness Visit  1940   • PFT SCREENING-FEV1 AND FEV/FVC RATIO / SPIROMETRY SHOULD BE PERFORMED ANNUALLY  11/19/1957   • IMM DTaP/Tdap/Td Vaccine (1 - Tdap) 11/19/1958   • IMM ZOSTER VACCINE  11/19/1999   • IMM PNEUMOCOCCAL 65+ (ADULT) LOW/MEDIUM RISK SERIES (1 of 2 - PCV13) 11/19/2004           4.  Reviewed/Updated the following with patient:       •   Preferred Pharmacy? YES       •   Preferred Lab? YES       •   Medications? YES. Was Abstract Encounter opened and chart updated? YES       •   Social History? YES. Was Abstract Encounter opened and chart updated? YES       •   Family History? YES. Was Abstract Encounter opened and chart updated? YES    5.  Updated Care Team?       •   DME Company (gait device, O2, CPAP, etc.) NO       •   Other Specialists (eye doctor, derm, GYN, cardiology, endo, etc): YES    6.  Patient was informed to arrive 15 min prior to their scheduled appointment and bring in their medication bottles? YES

## 2017-05-01 ENCOUNTER — OFFICE VISIT (OUTPATIENT)
Dept: MEDICAL GROUP | Facility: MEDICAL CENTER | Age: 77
End: 2017-05-01
Payer: MEDICARE

## 2017-05-01 VITALS
SYSTOLIC BLOOD PRESSURE: 136 MMHG | HEART RATE: 90 BPM | OXYGEN SATURATION: 94 % | DIASTOLIC BLOOD PRESSURE: 80 MMHG | WEIGHT: 139 LBS | HEIGHT: 64 IN | BODY MASS INDEX: 23.73 KG/M2 | RESPIRATION RATE: 16 BRPM | TEMPERATURE: 98.1 F

## 2017-05-01 DIAGNOSIS — J44.9 CHRONIC OBSTRUCTIVE PULMONARY DISEASE, UNSPECIFIED COPD TYPE (HCC): ICD-10-CM

## 2017-05-01 DIAGNOSIS — R93.89 ABNORMAL CXR: ICD-10-CM

## 2017-05-01 DIAGNOSIS — M85.80 OSTEOPENIA: ICD-10-CM

## 2017-05-01 DIAGNOSIS — E78.5 DYSLIPIDEMIA: ICD-10-CM

## 2017-05-01 DIAGNOSIS — R73.02 IGT (IMPAIRED GLUCOSE TOLERANCE): ICD-10-CM

## 2017-05-01 DIAGNOSIS — Z72.0 TOBACCO ABUSE: ICD-10-CM

## 2017-05-01 DIAGNOSIS — Z00.00 MEDICARE ANNUAL WELLNESS VISIT, SUBSEQUENT: ICD-10-CM

## 2017-05-01 PROCEDURE — 1101F PT FALLS ASSESS-DOCD LE1/YR: CPT | Performed by: INTERNAL MEDICINE

## 2017-05-01 PROCEDURE — G8510 SCR DEP NEG, NO PLAN REQD: HCPCS | Performed by: INTERNAL MEDICINE

## 2017-05-01 PROCEDURE — G0439 PPPS, SUBSEQ VISIT: HCPCS | Mod: 25 | Performed by: INTERNAL MEDICINE

## 2017-05-01 PROCEDURE — 4004F PT TOBACCO SCREEN RCVD TLK: CPT | Performed by: INTERNAL MEDICINE

## 2017-05-01 PROCEDURE — 99214 OFFICE O/P EST MOD 30 MIN: CPT | Mod: 25 | Performed by: INTERNAL MEDICINE

## 2017-05-01 PROCEDURE — 4040F PNEUMOC VAC/ADMIN/RCVD: CPT | Mod: 8P | Performed by: INTERNAL MEDICINE

## 2017-05-01 PROCEDURE — G8420 CALC BMI NORM PARAMETERS: HCPCS | Performed by: INTERNAL MEDICINE

## 2017-05-01 ASSESSMENT — PAIN SCALES - GENERAL: PAINLEVEL: NO PAIN

## 2017-05-01 ASSESSMENT — PATIENT HEALTH QUESTIONNAIRE - PHQ9: CLINICAL INTERPRETATION OF PHQ2 SCORE: 0

## 2017-05-01 NOTE — PROGRESS NOTES
CC: Establishing care for follow-up on abnormal x-ray and likely COPD.    HPI:   Cheli presents today with the following.    1. Medicare annual wellness visit, subsequent  Screenings performed below    2. Chronic obstructive pulmonary disease, unspecified COPD type (CMS-HCC)  Presents with a long smoking history and chest x-ray consistent with COPD. She denies any major breathing issues however recently had an upper respiratory illness. She reports she continues to smoke but no intention of quitting.    3. Dyslipidemia  Maintain on statin without myalgias. Denying any chest pain or progressive shortness of breath.    4. Osteopenia  Mild thinning of the bones maintain on vitamin D.    5. IGT (impaired glucose tolerance)  Slightly elevated blood sugars in the past but no signs of diabetes last A1c is under 5.5.    6. Tobacco abuse  Smoking for 60 years no intentions of quitting.    7. Abnormal CXR  Recent seen in urgent care for upper respiratory symptoms of cough. She was treated with antibiotics reports cough is resolving her breathing is back to baseline.      Depression Screening    Little interest or pleasure in doing things?  0 - not at all  Feeling down, depressed , or hopeless? 0 - not at all  Trouble falling or staying asleep, or sleeping too much?     Feeling tired or having little energy?     Poor appetite or overeating?     Feeling bad about yourself - or that you are a failure or have let yourself or your family down?    Trouble concentrating on things, such as reading the newspaper or watching television?    Moving or speaking so slowly that other people could have noticed.  Or the opposite - being so fidgety or restless that you have been moving around a lot more than usual?     Thoughts that you would be better off dead, or of hurting yourself?     Patient Health Questionnaire Score:    If depressive symptoms identified deferred to follow up visit unless specifically addressed in assessment and  plan.      Screening for Cognitive Impairment    Three Minute Recall (banana, sunrise, fence)  3/3    Draw clock face with all 12 numbers set to the hand to show 10 minures past 11 o'clock  1 4/5  If cognitive concerns identified deferred to follow up visit unless specifically addressed in assessment and plan.    Fall Risk Assessment    Has the patient had two or more falls in the last year or any fall with injury in the last year?  No  If Fall Risk identified deferred to follow up visit unless specifically addressed in assessment and plan.    Safety Assessment    Throw rugs on floor.  Yes  Cautioned about securing or removing.    Handrails on all stairs.  Yes  Good lighting in all hallways.  Yes  Difficulty hearing.  No  Patient counseled about all safety risks that were identified.    Functional Assessment ADLs    Are there any barriers preventing you from cooking for yourself or meeting nutritional needs?  No.    Are there any barriers preventing you from driving safely or obtaining transportation?  No.    Are there any barriers preventing you from using a telephone or calling for help?  No.    Are there any barriers preventing you from shopping?  No.    Are there any barriers preventing you from taking care of your own finances?  No.    Are there any barriers preventing you from managing your medications?  No.    Are currently engaing any exercise or physical activity?  No.       Health Maintenance Summary                Annual Wellness Visit Overdue 1940     PFT SCREENING-FEV1 AND FEV/FVC RATIO / SPIROMETRY SHOULD BE PERFORMED ANNUALLY Overdue 11/19/1957     IMM DTaP/Tdap/Td Vaccine Overdue 11/19/1958     IMM ZOSTER VACCINE Overdue 11/19/1999     IMM PNEUMOCOCCAL 65+ (ADULT) LOW/MEDIUM RISK SERIES Overdue 11/19/2004     MAMMOGRAM Next Due 1/18/2018      Done 1/18/2017 MA-DIAGNOSTIC MAMMO-UNILAT     Patient has more history with this topic...    BONE DENSITY Next Due 12/19/2021      Done 12/19/2016 DS-BONE  "DENSITY STUDY (DEXA)     Patient has more history with this topic...    COLONOSCOPY Next Due 2/26/2023      Patient Declined 2/26/2013           Patient Care Team:  Danny Buchanan M.D. as PCP - General (Internal Medicine)  Bossman Muse M.D. as Consulting Physician (Ophthalmology)      Patient Active Problem List    Diagnosis Date Noted   • Chronic obstructive pulmonary disease (CMS-HCC) 05/01/2017   • Vitamin D deficiency 12/23/2015   • IGT (impaired glucose tolerance) 09/03/2015   • Osteopenia 05/28/2014   • MEDICAL HOME 11/09/2012   • CATARACT    • Tobacco abuse    • Dyslipidemia        Current Outpatient Prescriptions   Medication Sig Dispense Refill   • albuterol 108 (90 BASE) MCG/ACT Aero Soln inhalation aerosol Inhale 2 Puffs by mouth every four hours as needed for Shortness of Breath. 1 Inhaler 0   • simvastatin (ZOCOR) 40 MG Tab TAKE 1 TABLET BY MOUTH ONCE DAILY 90 Tab 3   • vitamin D (CHOLECALCIFEROL) 1000 UNIT Tab Take 1,000 Units by mouth every day.     • ibuprofen (MOTRIN) 800 MG Tab Take 800 mg by mouth every 8 hours as needed.       No current facility-administered medications for this visit.         Allergies as of 05/01/2017 - Joss as Reviewed 05/01/2017   Allergen Reaction Noted   • Nkda [no known drug allergy]  09/22/2009        ROS: As per HPI.    /80 mmHg  Pulse 90  Temp(Src) 36.7 °C (98.1 °F)  Resp 16  Ht 1.626 m (5' 4.02\")  Wt 63.05 kg (139 lb)  BMI 23.85 kg/m2  SpO2 94%    Physical Exam:  Gen:         Alert and oriented, No apparent distress.  Neck:        No Lymphadenopathy or Bruits.  Lungs:     Clear to auscultation bilaterally  CV:          Regular rate and rhythm. No murmurs, rubs or gallops.  Abd:         Soft non tender, non distended. Normal active bowel sounds.  No  Hepatosplenomegaly, No pulsatile masses.                   Ext:          No clubbing, cyanosis, edema.      Assessment and Plan.   76 y.o. female with the following issues.    1. Medicare annual wellness " visit, subsequent  Discussed healthy lifestyle habits as well as screening regimens. Advanced directives already on file.  - Annual Wellness Visit - Includes PPPS Subsequent ()    2. Chronic obstructive pulmonary disease, unspecified COPD type (CMS-HCC)  X-rays consistent with COPD discussion today about breathing test which she hesitantly excepts to do.  - PULMONARY FUNCTION TESTS Test requested: Spirometry with-out & with Bronchodilator  - DX-CHEST-2 VIEWS; Future    3. Dyslipidemia  Lipids currently well controlled. Discussed continued diet and exercise recheck 6 months to 1 year.  - Annual Wellness Visit - Includes PPPS Subsequent ()    4. Osteopenia  Discussion about fall risk precautions.  - Annual Wellness Visit - Includes PPPS Subsequent ()    5. IGT (impaired glucose tolerance)  No overt diabetes continue to monitor  - Annual Wellness Visit - Includes PPPS Subsequent ()    6. Tobacco abuse  Recommendations given for cessation which she declines to want to do so.  - Annual Wellness Visit - Includes PPPS Subsequent ()    7. Abnormal CXR  Small abnormally on x-ray symptoms have resolved we'll get an x-ray next week to ensure resolution.

## 2017-05-01 NOTE — MR AVS SNAPSHOT
"        Cheli Parks   2017 10:40 AM   Office Visit   MRN: [id number]    Department:  [address] [place] [address]   Dept Phone:  [phone]    Description:  Female : 1940   Provider:  Danny Buchanan M.D.           Reason for Visit     Establish Care check up      Allergies as of 2017     Allergen Noted Reactions    Nkda [No Known Drug Allergy] 2009         You were diagnosed with     Medicare annual wellness visit, subsequent   [998576]       Dyslipidemia   [589818]       Simple chronic bronchitis (CMS-HCC)   [491.0.ICD-9-CM]       Osteopenia   [767848]       IGT (impaired glucose tolerance)   [772192]       Chronic obstructive pulmonary disease, unspecified COPD type (CMS-[facility])   [2343431]       Tobacco abuse   [480418]         Vital Signs     Blood Pressure Pulse Temperature Respirations Height Weight    136/80 mmHg 90 36.7 °C (98.1 °F) 16 1.626 m (5' 4.02\") 63.05 kg (139 lb)    Body Mass Index Oxygen Saturation Smoking Status             23.85 kg/m2 94% Current Every Day Smoker         Basic Information     Date Of Birth Sex Race Ethnicity Preferred Language    1940 Female White Non- English      Your appointments     2017 11:20 AM   Established Patient with Danny Buchanan M.D.   [facility] [address]    [address] [facility] [address]  [facility] [id number] [phone]           You will be receiving a confirmation call a few days before your appointment from our automated call confirmation system.              Problem List              ICD-10-CM Priority Class Noted - Resolved    CATARACT    Unknown - Present    Tobacco abuse Z72.0   Unknown - Present    Dyslipidemia E78.5   Unknown - Present    MEDICAL HOME    2012 - Present    Osteopenia M85.80   2014 - Present    IGT (impaired glucose tolerance) R73.02   9/3/2015 - Present    Vitamin D deficiency E55.9   2015 - Present    Chronic obstructive pulmonary disease (CMS-HCC) J44.9   2017 - " Present      Health Maintenance        Date Due Completion Dates    IMM DTaP/Tdap/Td Vaccine (1 - Tdap) 11/19/1958 ---    IMM ZOSTER VACCINE 11/19/1999 ---    IMM PNEUMOCOCCAL 65+ (ADULT) LOW/MEDIUM RISK SERIES (1 of 2 - PCV13) 11/19/2004 ---    MAMMOGRAM 1/18/2018 1/18/2017, 12/19/2016, 9/23/2015, 3/11/2013, 1/14/2011, 10/19/2009, 10/19/2009    BONE DENSITY 12/19/2021 12/19/2016, 12/12/2013, 8/9/2011    COLONOSCOPY 2/26/2023 2/26/2013 (Declined)    Override on 2/26/2013: Patient Declined            Current Immunizations     No immunizations on file.      Below and/or attached are the medications your provider expects you to take. Review all of your home medications and newly ordered medications with your provider and/or pharmacist. Follow medication instructions as directed by your provider and/or pharmacist. Please keep your medication list with you and share with your provider. Update the information when medications are discontinued, doses are changed, or new medications (including over-the-counter products) are added; and carry medication information at all times in the event of emergency situations     Allergies:  NKDA - (reactions not documented)               Medications  Valid as of: May 01, 2017 - 11:10 AM    Generic Name Brand Name Tablet Size Instructions for use    Albuterol Sulfate (Aero Soln) albuterol 108 (90 BASE) MCG/ACT Inhale 2 Puffs by mouth every four hours as needed for Shortness of Breath.        Cholecalciferol (Tab) cholecalciferol 1000 UNIT Take 1,000 Units by mouth every day.        Ibuprofen (Tab) MOTRIN 800 MG Take 800 mg by mouth every 8 hours as needed.        Simvastatin (Tab) ZOCOR 40 MG TAKE 1 TABLET BY MOUTH ONCE DAILY        .                 Medicines prescribed today were sent to:     [facility] - [address] [place] [address]    Phone: [phone] Fax: [phone]    Open 24 Hours?: No      Medication refill instructions:       If your  prescription bottle indicates you have medication refills left, it is not necessary to call your provider’s office. Please contact your pharmacy and they will refill your medication.    If your prescription bottle indicates you do not have any refills left, you may request refills at any time through one of the following ways: The online [employer] system (except Urgent Care), by calling your provider’s office, or by asking your pharmacy to contact your provider’s office with a refill request. Medication refills are processed only during regular business hours and may not be available until the next business day. Your provider may request additional information or to have a follow-up visit with you prior to refilling your medication.   *Please Note: Medication refills are assigned a new Rx number when refilled electronically. Your pharmacy may indicate that no refills were authorized even though a new prescription for the same medication is available at the pharmacy. Please request the medicine by name with the pharmacy before contacting your provider for a refill.        Your To Do List     Future Labs/Procedures Complete By Expires    DX-CHEST-2 VIEWS  As directed 5/1/2018         [employer] Status: Patient Declined        Quit Tobacco Information     Do you want to quit using tobacco?    Quitting tobacco decreases risks of cancer, heart and lung disease, increases life expectancy, improves sense of taste and smell, and increases spending money, among other benefits.    If you are thinking about quitting, we can help.  • [facility] Quit Tobacco Program: 739.596.5637  o Program occurs weekly for four weeks and includes pharmacist consultation on products to support quitting smoking or chewing tobacco. A provider referral is needed for pharmacist consultation.  • Tobacco Users Help Hotline: 9-119-QUIT-NOW (330-9541) or https://[place].quitlogix.org/  o Free, confidential telephone and online coaching for [place] residents.  Sessions are designed on a schedule that is convenient for you. Eligible clients receive free nicotine replacement therapy.  • Nationally: www.smokefree.gov  o Information and professional assistance to support both immediate and long-term needs as you become, and remain, a non-smoker. Smokefree.gov allows you to choose the help that best fits your needs.

## 2017-05-24 ENCOUNTER — HOSPITAL ENCOUNTER (OUTPATIENT)
Dept: OTHER | Facility: MEDICAL CENTER | Age: 77
End: 2017-05-24
Attending: INTERNAL MEDICINE
Payer: MEDICARE

## 2017-05-24 ENCOUNTER — HOSPITAL ENCOUNTER (OUTPATIENT)
Dept: RADIOLOGY | Facility: MEDICAL CENTER | Age: 77
End: 2017-05-24
Attending: INTERNAL MEDICINE
Payer: MEDICARE

## 2017-05-24 DIAGNOSIS — J44.9 CHRONIC OBSTRUCTIVE PULMONARY DISEASE, UNSPECIFIED COPD TYPE (HCC): ICD-10-CM

## 2017-05-24 PROCEDURE — 94060 EVALUATION OF WHEEZING: CPT

## 2017-05-24 PROCEDURE — 94060 EVALUATION OF WHEEZING: CPT | Mod: 26 | Performed by: INTERNAL MEDICINE

## 2017-05-24 ASSESSMENT — PULMONARY FUNCTION TESTS
FEV1/FVC_PERCENT_PREDICTED: 96
FEV1: 1.68
FEV1/FVC_PERCENT_CHANGE: 250
FEV1: 1.76
FEV1/FVC: 71.54
FVC: 2.42
FVC: 2.46
FVC_PREDICTED: 2.66
FEV1/FVC_PERCENT_PREDICTED: 74
FVC_PERCENT_PREDICTED: 93
FEV1_PERCENT_PREDICTED: 85
FVC_PERCENT_PREDICTED: 91
FEV1_PERCENT_PREDICTED: 89
FEV1_PERCENT_CHANGE: 5
FEV1/FVC_PERCENT_PREDICTED: 93
FEV1_PREDICTED: 1.98
FEV1/FVC: 69
FEV1_PERCENT_CHANGE: 2

## 2017-05-25 ENCOUNTER — TELEPHONE (OUTPATIENT)
Dept: MEDICAL GROUP | Facility: MEDICAL CENTER | Age: 77
End: 2017-05-25

## 2017-05-25 NOTE — TELEPHONE ENCOUNTER
Future Appointments       Provider Department Center    5/30/2017 11:20 AM Danny Buchanan M.D. [facility] [address]    11/1/2017 11:20 AM Danny Buchanan M.D. [place] [address]        ESTABLISHED PATIENT PRE-VISIT PLANNING     Note: Patient will not be contacted if there is no indication to call.     1.  Reviewed note from last office visit with PCP and/or other med group provider: Yes    2.  If any orders were placed at last visit, do we have Results/Consult Notes?        •  Labs - Labs were not ordered at last office visit.       •  Imaging - Imaging ordered, completed and results are in chart.       •  Referrals - No referrals were ordered at last office visit.    3.  Immunizations were updated in Epic using WebIZ?: No WebIZ record       •  Web Iz Recommendations: None    4.  Patient is due for the following Health Maintenance Topics:   Health Maintenance Due   Topic Date Due   • PFT SCREENING-FEV1 AND FEV/FVC RATIO / SPIROMETRY SHOULD BE PERFORMED ANNUALLY  11/19/1957   • IMM DTaP/Tdap/Td Vaccine (1 - Tdap) 11/19/1958   • IMM ZOSTER VACCINE  11/19/1999   • IMM PNEUMOCOCCAL 65+ (ADULT) LOW/MEDIUM RISK SERIES (1 of 2 - PCV13) 11/19/2004           5.  Patient was not informed to arrive 15 min prior to their scheduled appointment and bring in their medication bottles.

## 2017-05-26 NOTE — PROCEDURES
DATE OF STUDY:  05/24/2017    FINDINGS:  1.  Baseline FEV1 is 1.68 liters, which is 85% of predicted.  The FEV1/FVC   ratio is reduced at 69%.  MVV is reduced at 64% of predicted.  2.  After the administration of an inhaled bronchodilator, there is a 5%   improvement in FEV1.    IMPRESSION:  The reduction in FEV1/FVC ratio and mild improvement with an   inhaled bronchodilator suggest the presence of mild obstructive lung disease.       ____________________________________     MD RAVINDRA ROBERTS / NTS    DD:  05/26/2017 12:34:55  DT:  05/26/2017 15:48:43    D#:  [id number]  Job#:  [id number]    cc: AMY ELY MD

## 2017-05-30 ENCOUNTER — OFFICE VISIT (OUTPATIENT)
Dept: MEDICAL GROUP | Facility: MEDICAL CENTER | Age: 77
End: 2017-05-30
Payer: MEDICARE

## 2017-05-30 VITALS
RESPIRATION RATE: 16 BRPM | SYSTOLIC BLOOD PRESSURE: 124 MMHG | TEMPERATURE: 98.7 F | WEIGHT: 139 LBS | HEART RATE: 85 BPM | BODY MASS INDEX: 23.73 KG/M2 | OXYGEN SATURATION: 97 % | DIASTOLIC BLOOD PRESSURE: 76 MMHG | HEIGHT: 64 IN

## 2017-05-30 DIAGNOSIS — J44.9 CHRONIC OBSTRUCTIVE PULMONARY DISEASE, UNSPECIFIED COPD TYPE (HCC): ICD-10-CM

## 2017-05-30 DIAGNOSIS — Z72.0 TOBACCO ABUSE: ICD-10-CM

## 2017-05-30 DIAGNOSIS — R22.2 CHEST MASS: ICD-10-CM

## 2017-05-30 PROCEDURE — 99406 BEHAV CHNG SMOKING 3-10 MIN: CPT | Performed by: INTERNAL MEDICINE

## 2017-05-30 PROCEDURE — 4040F PNEUMOC VAC/ADMIN/RCVD: CPT | Mod: 8P | Performed by: INTERNAL MEDICINE

## 2017-05-30 PROCEDURE — 99214 OFFICE O/P EST MOD 30 MIN: CPT | Mod: 25 | Performed by: INTERNAL MEDICINE

## 2017-05-30 PROCEDURE — G8420 CALC BMI NORM PARAMETERS: HCPCS | Performed by: INTERNAL MEDICINE

## 2017-05-30 PROCEDURE — 4004F PT TOBACCO SCREEN RCVD TLK: CPT | Performed by: INTERNAL MEDICINE

## 2017-05-30 PROCEDURE — 1101F PT FALLS ASSESS-DOCD LE1/YR: CPT | Performed by: INTERNAL MEDICINE

## 2017-05-30 RX ORDER — ALBUTEROL SULFATE 90 UG/1
2 AEROSOL, METERED RESPIRATORY (INHALATION) EVERY 4 HOURS PRN
Qty: 1 INHALER | Refills: 6 | Status: SHIPPED | OUTPATIENT
Start: 2017-05-30 | End: 2017-07-20

## 2017-05-30 NOTE — PROGRESS NOTES
"CC: Follow-up multiple issues    HPI:   Cheli presents today with the following.    1. Chronic obstructive pulmonary disease, unspecified COPD type (CMS-HCC)  Presents after having breathing test with 60-pack-year smoking history showing mild obstructive disease. She was recently treated for pneumonia and her cough is significantly improved but still present. She denies significant shortness of breath and no hemoptysis.    2. Tobacco abuse  Again reports smoking for over 60 years not interested in quitting.    3. Chest mass  One month after initial x-rays showing abnormalities in the right lower lobe with persistent findings on x-ray. There is no fevers or chills and she did complete a course of antibiotics.      Patient Active Problem List    Diagnosis Date Noted   • Chronic obstructive pulmonary disease (CMS-HCC) 05/01/2017   • Vitamin D deficiency 12/23/2015   • IGT (impaired glucose tolerance) 09/03/2015   • Osteopenia 05/28/2014   • CATARACT    • Tobacco abuse    • Dyslipidemia        Current Outpatient Prescriptions   Medication Sig Dispense Refill   • albuterol 108 (90 BASE) MCG/ACT Aero Soln inhalation aerosol Inhale 2 Puffs by mouth every four hours as needed for Shortness of Breath. 1 Inhaler 6   • simvastatin (ZOCOR) 40 MG Tab TAKE 1 TABLET BY MOUTH ONCE DAILY 90 Tab 3   • vitamin D (CHOLECALCIFEROL) 1000 UNIT Tab Take 1,000 Units by mouth every day.     • ibuprofen (MOTRIN) 800 MG Tab Take 800 mg by mouth every 8 hours as needed.       No current facility-administered medications for this visit.         Allergies as of 05/30/2017 - Joss as Reviewed 05/30/2017   Allergen Reaction Noted   • Nkda [no known drug allergy]  09/22/2009        ROS: As per HPI.    /76 mmHg  Pulse 85  Temp(Src) 37.1 °C (98.7 °F)  Resp 16  Ht 1.626 m (5' 4\")  Wt 63.05 kg (139 lb)  BMI 23.85 kg/m2  SpO2 97%    Physical Exam:  Gen:         Alert and oriented, No apparent distress.  Neck:        No Lymphadenopathy or " Bruits.  Lungs:     Clear to auscultation bilaterally  CV:          Regular rate and rhythm. No murmurs, rubs or gallops.               Ext:          No clubbing, cyanosis, edema.      Assessment and Plan.   76 y.o. female with the following issues.    1. Chronic obstructive pulmonary disease, unspecified COPD type (CMS-[facility])  Have given albuterol inhaler.  - albuterol 108 (90 BASE) MCG/ACT Aero Soln inhalation aerosol; Inhale 2 Puffs by mouth every four hours as needed for Shortness of Breath.  Dispense: 1 Inhaler; Refill: 6    2. Tobacco abuse  Recommendations given for cessation.  Over 3 minutes spent on counseling    3. Chest mass  Imaging findings not overwhelming for cancer but again not resolve after appropriate course of antibiotics she is hesitant to pursue anything further than a CT but is interested in finding out more information so that we can make a decision about her lungs.  - CT-CHEST (THORAX) W/O; Future

## 2017-05-30 NOTE — MR AVS SNAPSHOT
"        Cheli Parks   2017 11:20 AM   Office Visit   MRN: [id number]    Department:  [address] [place] [address]   Dept Phone:  [phone]    Description:  Female : 1940   Provider:  Danny Buchanan M.D.           Reason for Visit     Follow-Up Chest X ray      Allergies as of 2017     Allergen Noted Reactions    Nkda [No Known Drug Allergy] 2009         You were diagnosed with     Chronic obstructive pulmonary disease, unspecified COPD type (CMS-HCC)   [6969606]       Tobacco abuse   [932871]       Chest mass   [426910]         Vital Signs     Blood Pressure Pulse Temperature Respirations Height Weight    124/76 mmHg 85 37.1 °C (98.7 °F) 16 1.626 m (5' 4\") 63.05 kg (139 lb)    Body Mass Index Oxygen Saturation Smoking Status             23.85 kg/m2 97% Current Every Day Smoker         Basic Information     Date Of Birth Sex Race Ethnicity Preferred Language    1940 Female White Non- English      Your appointments     2017 11:20 AM   Established Patient with Danny Buchanan M.D.   [facility] [address]    [address]  [facility] [address]  [facility] [address] [phone]           You will be receiving a confirmation call a few days before your appointment from our automated call confirmation system.              Problem List              ICD-10-CM Priority Class Noted - Resolved    CATARACT    Unknown - Present    Tobacco abuse Z72.0   Unknown - Present    Dyslipidemia E78.5   Unknown - Present    Osteopenia M85.80   2014 - Present    IGT (impaired glucose tolerance) R73.02   9/3/2015 - Present    Vitamin D deficiency E55.9   2015 - Present    Chronic obstructive pulmonary disease (CMS-HCC) J44.9   2017 - Present      Health Maintenance        Date Due Completion Dates    IMM DTaP/Tdap/Td Vaccine (1 - Tdap) 1958 ---    IMM ZOSTER VACCINE 1999 ---    IMM PNEUMOCOCCAL 65+ (ADULT) LOW/MEDIUM RISK SERIES (1 of 2 - PCV13) " 11/19/2004 ---    MAMMOGRAM 1/18/2018 1/18/2017, 12/19/2016, 9/23/2015, 3/11/2013, 1/14/2011, 10/19/2009, 10/19/2009    BONE DENSITY 12/19/2021 12/19/2016, 12/12/2013, 8/9/2011    COLONOSCOPY 2/26/2023 2/26/2013 (Declined)    Override on 2/26/2013: Patient Declined            Current Immunizations     No immunizations on file.      Below and/or attached are the medications your provider expects you to take. Review all of your home medications and newly ordered medications with your provider and/or pharmacist. Follow medication instructions as directed by your provider and/or pharmacist. Please keep your medication list with you and share with your provider. Update the information when medications are discontinued, doses are changed, or new medications (including over-the-counter products) are added; and carry medication information at all times in the event of emergency situations     Allergies:  NKDA - (reactions not documented)               Medications  Valid as of: May 30, 2017 - 11:43 AM    Generic Name Brand Name Tablet Size Instructions for use    Albuterol Sulfate (Aero Soln) albuterol 108 (90 BASE) MCG/ACT Inhale 2 Puffs by mouth every four hours as needed for Shortness of Breath.        Cholecalciferol (Tab) cholecalciferol 1000 UNIT Take 1,000 Units by mouth every day.        Ibuprofen (Tab) MOTRIN 800 MG Take 800 mg by mouth every 8 hours as needed.        Simvastatin (Tab) ZOCOR 40 MG TAKE 1 TABLET BY MOUTH ONCE DAILY        .                 Medicines prescribed today were sent to:     [facility] [address] [place] [address] - [address]     [address] [facility] [address]    Phone: [phone] Fax: [phone]    Open 24 Hours?: No      Medication refill instructions:       If your prescription bottle indicates you have medication refills left, it is not necessary to call your provider’s office. Please contact your pharmacy and they will refill your medication.    If your prescription bottle indicates you  do not have any refills left, you may request refills at any time through one of the following ways: The online Pins system (except Urgent Care), by calling your provider’s office, or by asking your pharmacy to contact your provider’s office with a refill request. Medication refills are processed only during regular business hours and may not be available until the next business day. Your provider may request additional information or to have a follow-up visit with you prior to refilling your medication.   *Please Note: Medication refills are assigned a new Rx number when refilled electronically. Your pharmacy may indicate that no refills were authorized even though a new prescription for the same medication is available at the pharmacy. Please request the medicine by name with the pharmacy before contacting your provider for a refill.        Your To Do List     Future Labs/Procedures Complete By Expires    CT-CHEST (THORAX) W/O  As directed 5/30/2018         Pins Status: Patient Declined        Quit Tobacco Information     Do you want to quit using tobacco?    Quitting tobacco decreases risks of cancer, heart and lung disease, increases life expectancy, improves sense of taste and smell, and increases spending money, among other benefits.    If you are thinking about quitting, we can help.  • [facility] Quit Tobacco Program: 878-884-5426  o Program occurs weekly for four weeks and includes pharmacist consultation on products to support quitting smoking or chewing tobacco. A provider referral is needed for pharmacist consultation.  • Tobacco Users Help Hotline: 0-253-QUIT-NOW (803-3585) or https://[place].quitlogix.org/  o Free, confidential telephone and online coaching for [place] residents. Sessions are designed on a schedule that is convenient for you. Eligible clients receive free nicotine replacement therapy.  • Nationally: www.smokefree.gov  o Information and professional assistance to support both immediate  and long-term needs as you become, and remain, a non-smoker. Smokefree.gov allows you to choose the help that best fits your needs.

## 2017-06-21 ENCOUNTER — HOSPITAL ENCOUNTER (OUTPATIENT)
Dept: RADIOLOGY | Facility: MEDICAL CENTER | Age: 77
End: 2017-06-21
Attending: INTERNAL MEDICINE
Payer: MEDICARE

## 2017-06-21 DIAGNOSIS — R22.2 CHEST MASS: ICD-10-CM

## 2017-06-21 PROCEDURE — 71250 CT THORAX DX C-: CPT

## 2017-06-22 DIAGNOSIS — R91.8 LUNG MASS: ICD-10-CM

## 2017-06-26 ENCOUNTER — OFFICE VISIT (OUTPATIENT)
Dept: HEMATOLOGY ONCOLOGY | Facility: MEDICAL CENTER | Age: 77
End: 2017-06-26
Payer: MEDICARE

## 2017-06-26 VITALS
SYSTOLIC BLOOD PRESSURE: 146 MMHG | DIASTOLIC BLOOD PRESSURE: 82 MMHG | HEART RATE: 85 BPM | HEIGHT: 64 IN | TEMPERATURE: 99 F | BODY MASS INDEX: 24.07 KG/M2 | WEIGHT: 140.98 LBS | OXYGEN SATURATION: 94 %

## 2017-06-26 DIAGNOSIS — R91.1 LUNG NODULE: ICD-10-CM

## 2017-06-26 PROCEDURE — 99205 OFFICE O/P NEW HI 60 MIN: CPT | Performed by: NURSE PRACTITIONER

## 2017-06-26 RX ORDER — CALCIUM CARBONATE 500(1250)
500 TABLET ORAL 2 TIMES DAILY WITH MEALS
COMMUNITY

## 2017-06-26 ASSESSMENT — ENCOUNTER SYMPTOMS
SHORTNESS OF BREATH: 0
CONSTIPATION: 0
DIZZINESS: 0
NAUSEA: 0
CHILLS: 0
HEADACHES: 0
WHEEZING: 0
VOMITING: 0
DIARRHEA: 0
WEIGHT LOSS: 0
PALPITATIONS: 0
COUGH: 1
FEVER: 0
DEPRESSION: 1
SPUTUM PRODUCTION: 1
HEMOPTYSIS: 0
TINGLING: 0

## 2017-06-26 ASSESSMENT — PAIN SCALES - GENERAL: PAINLEVEL: NO PAIN

## 2017-06-26 NOTE — MR AVS SNAPSHOT
"        Cheli Parks   2017 4:00 PM   Office Visit   MRN: [id number]    Department:  [facility]   Dept Phone:  [phone]    Description:  Female : 1940   Provider:  Corinne Franco, A.P.N.           Reason for Visit     New Patient Ref by Dewayne, Dx: Lung nodule      Allergies as of 2017     Allergen Noted Reactions    Nkda [No Known Drug Allergy] 2009         You were diagnosed with     Lung nodule   [[id number]]         Vital Signs     Blood Pressure Pulse Temperature Height Weight Body Mass Index    146/82 mmHg 85 37.2 °C (99 °F) 1.626 m (5' 4\") 63.95 kg (140 lb 15.8 oz) 24.19 kg/m2    Oxygen Saturation Smoking Status                94% Current Every Day Smoker          Basic Information     Date Of Birth Sex Race Ethnicity Preferred Language    1940 Female White Non- English      Your appointments     2017 11:30 AM   CT PET60 with [address]   IMAGING [address]    [address]  [place] [address]   [phone]            Jul 10, 2017  1:30 PM   IC Est Patient 30 with Corinne Franco, A.P.N.   [facility] (--)    [address] [facility], [address]  [place] [address]   [phone]            2017 11:20 AM   Established Patient with Danny Buchanan M.D.   [facility] [address] ([place] [facility])    [address] [facility]  [address]  [place] [address]   [phone]           You will be receiving a confirmation call a few days before your appointment from our automated call confirmation system.              Problem List              ICD-10-CM Priority Class Noted - Resolved    CATARACT    Unknown - Present    Tobacco abuse Z72.0   Unknown - Present    Dyslipidemia E78.5   Unknown - Present    Osteopenia M85.80   2014 - Present    IGT (impaired glucose tolerance) R73.02   9/3/2015 - Present    Vitamin D deficiency E55.9   2015 - Present    Chronic obstructive pulmonary disease (CMS-HCC) J44.9   2017 - Present   " Lung mass R91.8   6/22/2017 - Present      Health Maintenance        Date Due Completion Dates    IMM DTaP/Tdap/Td Vaccine (1 - Tdap) 11/19/1958 ---    IMM ZOSTER VACCINE 11/19/1999 ---    IMM PNEUMOCOCCAL 65+ (ADULT) LOW/MEDIUM RISK SERIES (1 of 2 - PCV13) 11/19/2004 ---    MAMMOGRAM 1/18/2018 1/18/2017, 12/19/2016, 9/23/2015, 3/11/2013, 1/14/2011, 10/19/2009, 10/19/2009    BONE DENSITY 12/19/2021 12/19/2016, 12/12/2013, 8/9/2011    COLONOSCOPY 2/26/2023 2/26/2013 (Declined)    Override on 2/26/2013: Patient Declined            Current Immunizations     No immunizations on file.      Below and/or attached are the medications your provider expects you to take. Review all of your home medications and newly ordered medications with your provider and/or pharmacist. Follow medication instructions as directed by your provider and/or pharmacist. Please keep your medication list with you and share with your provider. Update the information when medications are discontinued, doses are changed, or new medications (including over-the-counter products) are added; and carry medication information at all times in the event of emergency situations     Allergies:  NKDA - (reactions not documented)               Medications  Valid as of: June 26, 2017 -  5:03 PM    Generic Name Brand Name Tablet Size Instructions for use    Albuterol Sulfate (Aero Soln) albuterol 108 (90 BASE) MCG/ACT Inhale 2 Puffs by mouth every four hours as needed for Shortness of Breath.        Cholecalciferol (Tab) cholecalciferol 1000 UNIT Take 1,000 Units by mouth every day.        Ibuprofen (Tab) MOTRIN 800 MG Take 800 mg by mouth every 8 hours as needed.        Oyster Shell (Tab) OS-MARCO A 500 500 MG Take 500 mg by mouth 2 times a day, with meals.        Simvastatin (Tab) ZOCOR 40 MG TAKE 1 TABLET BY MOUTH ONCE DAILY        .                 Medicines prescribed today were sent to:     [facility] [address] [place] [address], [place] - [address] [place]  [place] [address]    Phone: [phone] Fax: [phone]    Open 24 Hours?: No      Medication refill instructions:       If your prescription bottle indicates you have medication refills left, it is not necessary to call your provider’s office. Please contact your pharmacy and they will refill your medication.    If your prescription bottle indicates you do not have any refills left, you may request refills at any time through one of the following ways: The online [email or web address] system (except Urgent Care), by calling your provider’s office, or by asking your pharmacy to contact your provider’s office with a refill request. Medication refills are processed only during regular business hours and may not be available until the next business day. Your provider may request additional information or to have a follow-up visit with you prior to refilling your medication.   *Please Note: Medication refills are assigned a new Rx number when refilled electronically. Your pharmacy may indicate that no refills were authorized even though a new prescription for the same medication is available at the pharmacy. Please request the medicine by name with the pharmacy before contacting your provider for a refill.        Your To Do List     Future Labs/Procedures Complete By Expires    [id number] BASE TO MID-THIGH  As directed 6/26/2018         [email or web address] Status: Patient Declined        Quit Tobacco Information     Do you want to quit using tobacco?    Quitting tobacco decreases risks of cancer, heart and lung disease, increases life expectancy, improves sense of taste and smell, and increases spending money, among other benefits.    If you are thinking about quitting, we can help.  • [facility] Quit Tobacco Program: 911.393.3521  o Program occurs weekly for four weeks and includes pharmacist consultation on products to support quitting smoking or chewing tobacco. A provider referral is needed for pharmacist consultation.  • Tobacco Users Help  Hotline: 6-800-QUIT-NOW (690-1604) or https://[place].quitlogix.org/  o Free, confidential telephone and online coaching for [place] residents. Sessions are designed on a schedule that is convenient for you. Eligible clients receive free nicotine replacement therapy.  • Nationally: www.smokefree.gov  o Information and professional assistance to support both immediate and long-term needs as you become, and remain, a non-smoker. Smokefree.gov allows you to choose the help that best fits your needs.

## 2017-06-26 NOTE — PROGRESS NOTES
"Subjective:      Cheli Parks is a 76 y.o. female who presents as a New Patient for a lung nodule.         HPI    Patient referred to me, Intake Oncology Coordinator by her PCP Dr. Buchanan for lung nodule.  Patient is accompanied by herself for today's visit.    Patient was recently seen in urgent care for persistent cough in April 2017. At that time she did have a chest x-ray completed which showed a \"possible right lower lobe airspace process.\" According to the patient she was sent home on cough medicine at that time. She then presented to the urgent care again in May for a persistent cough and did undergo another chest x-ray at that time. The chest x-ray showed \"Persistent mild increased density in the medial right lower lobe could be due to pneumonia, atelectasis or scarring.\" I did review both the chest x-rays with the patient today as well. Patient was treated for pneumonia at that time and started on antibiotics. She stated her symptoms didn't improve dramatically once on the antibiotics.     Patient then recently established care with a new primary care provider. He decided to complete a CT scan of the chest to evaluate improvement of her pneumonia. Patient had a CT scan recently completed on June 22. I personally reviewed the CT scan in detail as well as reviewed with the patient today. CT scan shows a 2.2 x 3.8 cm spiculated mass in the right lower lobe which is consistent with a malignancy. Also noted on the CT scan were multiple hypodensities within the liver, characterized as likely to be cysts however the liver was not completely evaluated due to it was a CT scan of the chest.    Patient denies any fevers, chills, unplanned weight loss or fatigue. She does have a cough with some sputum production mostly in the morning. She denies hemoptysis or shortness of breath. She does have inhalers at home but does not require using them. She denies any wheezing. Patient denies any chest pain, heart " "palpitations or swelling in her legs. She denies any nausea, vomiting, diarrhea or constipation. She does voice without difficulty, however complains of frequent urination. She does have some arthritis and experiences some joint pain in her knee. She denies any rashes or itching. She denies dizziness, peripheral neuropathy or headaches. She does have some mild \"normal\" depression but denies any anxiety.    Please see past medical and surgical history below.    Patient with a family history of breast cancer and a paternal aunt, throat cancer in a maternal grandfather. Her paternal grandfather also had cancer.    Patient is a current smoker. She has been smoking for approximately 60 years at an average of one pack per day to equal a 60-pack-year smoking history.    Allergies   Allergen Reactions   • Nkda [No Known Drug Allergy]      Current Outpatient Prescriptions on File Prior to Visit   Medication Sig Dispense Refill   • simvastatin (ZOCOR) 40 MG Tab TAKE 1 TABLET BY MOUTH ONCE DAILY 90 Tab 3   • vitamin D (CHOLECALCIFEROL) 1000 UNIT Tab Take 1,000 Units by mouth every day.     • albuterol 108 (90 BASE) MCG/ACT Aero Soln inhalation aerosol Inhale 2 Puffs by mouth every four hours as needed for Shortness of Breath. 1 Inhaler 6   • ibuprofen (MOTRIN) 800 MG Tab Take 800 mg by mouth every 8 hours as needed.       No current facility-administered medications on file prior to visit.     Past Medical History   Diagnosis Date   • Tobacco abuse    • Hyperlipidemia    • CATARACT      Left   • MEDICAL HOME 11/9/2012   • Arthritis    • Bilateral breast cysts      in her mid-20s     Past Surgical History   Procedure Laterality Date   • Abdominal hysterectomy total     • Cataract extraction with iol Bilateral      Family History   Problem Relation Age of Onset   • Heart Disease Father 87     MI   • Lung Disease Father      emphazema   • Cancer Paternal Aunt      Breast Cancer   • Arthritis Mother    • Lung Disease Sister      " "COPD   • Diabetes Sister    • Cancer Maternal Grandfather      throat cancer   • Dementia Paternal Grandmother    • Cancer Paternal Grandfather      Social History     Social History   • Marital Status:      Spouse Name: N/A   • Number of Children: 1   • Years of Education: N/A     Social History Main Topics   • Smoking status: Current Every Day Smoker -- 1.00 packs/day for 60 years     Types: Cigarettes   • Smokeless tobacco: Current User   • Alcohol Use: 0.0 oz/week     0 Standard drinks or equivalent per week      Comment: occasionally   • Drug Use: No   • Sexual Activity: No     Other Topics Concern   • None     Social History Narrative       Review of Systems   Constitutional: Negative for fever, chills, weight loss and malaise/fatigue.   Respiratory: Positive for cough and sputum production (mostly in the morning). Negative for hemoptysis, shortness of breath (has inhalers but does not need it. ) and wheezing.    Cardiovascular: Negative for chest pain, palpitations and leg swelling.   Gastrointestinal: Negative for nausea, vomiting, diarrhea and constipation.        Bowels are intermittent based on her diet   Genitourinary: Positive for frequency. Negative for dysuria.   Musculoskeletal: Positive for joint pain (once in a while she will have pain in her left knee).   Skin: Negative for itching and rash.   Neurological: Negative for dizziness, tingling and headaches.   Psychiatric/Behavioral: Positive for depression (\"normal\" depression ).          Objective:     /82 mmHg  Pulse 85  Temp(Src) 37.2 °C (99 °F)  Ht 1.626 m (5' 4\")  Wt 63.95 kg (140 lb 15.8 oz)  BMI 24.19 kg/m2  SpO2 94%     Physical Exam   Constitutional: She is oriented to person, place, and time. She appears well-developed and well-nourished. No distress.   HENT:   Head: Normocephalic and atraumatic.   Mouth/Throat: Oropharynx is clear and moist. No oropharyngeal exudate.   Eyes: Conjunctivae and EOM are normal. Pupils are " equal, round, and reactive to light. Right eye exhibits no discharge. Left eye exhibits no discharge. No scleral icterus.   Neck: Normal range of motion. Neck supple. No thyromegaly present.   Cardiovascular: Normal rate, regular rhythm, normal heart sounds and intact distal pulses.  Exam reveals no gallop and no friction rub.    No murmur heard.  Pulmonary/Chest: Effort normal. No respiratory distress. She has no wheezes.   Diminished lung sounds throughout and coarse lung sounds in the right lower lung base.    Abdominal: Soft. Bowel sounds are normal. She exhibits no distension. There is no tenderness.   Musculoskeletal: Normal range of motion. She exhibits no edema or tenderness.   Lymphadenopathy:     She has no cervical adenopathy.   Neurological: She is alert and oriented to person, place, and time.   Skin: Skin is warm and dry. No rash noted. She is not diaphoretic. No erythema. No pallor.   Vitals reviewed.      Ct-chest (thorax) W/o    6/22/2017 6/21/2017 11:08 AM HISTORY/REASON FOR EXAM:  Chest Mass TECHNIQUE/EXAM DESCRIPTION: CT scan of the chest without contrast. Thin-section helical images were obtained from the lung apices through the adrenal glands. Low dose optimization technique was utilized for this CT exam including automated exposure control and adjustment of the mA and/or kV according to patient size. COMPARISON:  None FINDINGS: The study is limited due to non-use of intravenous contrast.  The mediastinum and hilum is not well evaluated. Lungs are hyperexpanded and emphysematous. A 2.2 x 3.8 cm spiculated mass is located medial right lower lobe and extends to the adjacent pleural surface. No other infiltrates are identified. No pleural effusions. Limited evaluation of the mediastinum and hilar without contrast. No evidence mediastinal adenopathy. Normal size heart. Minimal pericardial fluid. Moderate calcified plaque aorta. Ectatic ascending aorta measuring up to 3.5 cm in diameter. Coronary  artery calcifications. No large masses are seen within the upper abdomen. Multiple hypodensities within the liver that have the appearance of cysts. Mild degenerative changes of the thoracic spine.     6/22/2017  1.  2.2 x 3.8 cm spiculated mass right lower lobe consistent with malignant neoplasm. 2.  Hyperexpanded emphysematous lungs. 3.  No free fluid identified. 4.  Coronary artery calcifications. 5.  Multiple hypodensities within the liver that are likely cysts. They are incompletely characterized.          Assessment/Plan:     1. Lung nodule  [id number] BASE TO MID-THIGH     Plan  1. Patient with a CT scan showing a 2.8 x 2.8 cm pretreatment mass in the right lower lobe which is concerning for malignancy. According to the radiological report she does have multiple hypodensities within the liver that are likely to be cysts. However reviewing the CT scan personally questioning whether the hypodensities in the liver may be metastatic lesions as well. I will start with a PET scan at this time to determine whether a lung biopsy or possibly a biopsy of the liver lesion is warranted. I did discuss the plan with the patient in detail. She is very anxious regarding the results. She stated that she may not want to proceed with a biopsy, or she may not be interested in doing any treatment if she is diagnosed with cancer. I did explain to patient that we will take it was at that time and I will review with her the PET/CT results to discuss the further plan of care. At this time patient would like to wait until after the Fourth of July holiday to proceed with a PET scan. We will schedule her PET/CT for Thursday, July 6. She will follow-up with me in the office on Monday, July 10 to review the results and discuss further plan of care.      Spent 60 minutes in direct, face-to-face patient contact in which greater than 50% of the visit was spent counseling and coordinating of care.       Please note that this dictation was  created using voice recognition software. I have made every reasonable attempt to correct obvious errors, but I expect that there are errors of grammar and possibly content that I did not discover before finalizing the note.

## 2017-07-06 ENCOUNTER — HOSPITAL ENCOUNTER (OUTPATIENT)
Dept: RADIOLOGY | Facility: MEDICAL CENTER | Age: 77
End: 2017-07-06
Attending: NURSE PRACTITIONER
Payer: MEDICARE

## 2017-07-06 DIAGNOSIS — R91.1 LUNG NODULE: ICD-10-CM

## 2017-07-06 PROCEDURE — A9552 F18 FDG: HCPCS

## 2017-07-10 ENCOUNTER — OFFICE VISIT (OUTPATIENT)
Dept: HEMATOLOGY ONCOLOGY | Facility: MEDICAL CENTER | Age: 77
End: 2017-07-10
Payer: MEDICARE

## 2017-07-10 VITALS
DIASTOLIC BLOOD PRESSURE: 98 MMHG | BODY MASS INDEX: 23.52 KG/M2 | TEMPERATURE: 99.9 F | RESPIRATION RATE: 18 BRPM | OXYGEN SATURATION: 94 % | HEART RATE: 85 BPM | HEIGHT: 64 IN | WEIGHT: 137.79 LBS | SYSTOLIC BLOOD PRESSURE: 158 MMHG

## 2017-07-10 DIAGNOSIS — R91.1 LUNG NODULE: ICD-10-CM

## 2017-07-10 PROCEDURE — 99213 OFFICE O/P EST LOW 20 MIN: CPT | Performed by: NURSE PRACTITIONER

## 2017-07-10 ASSESSMENT — ENCOUNTER SYMPTOMS
WHEEZING: 0
CHILLS: 0
NAUSEA: 0
FEVER: 0
CONSTIPATION: 0
DIARRHEA: 0
SHORTNESS OF BREATH: 0
COUGH: 0
VOMITING: 0

## 2017-07-10 ASSESSMENT — PAIN SCALES - GENERAL: PAINLEVEL: NO PAIN

## 2017-07-10 NOTE — MR AVS SNAPSHOT
"        Cheli Parks   7/10/2017 1:30 PM   Office Visit   MRN: [id number]    Department:  [facility]   Dept Phone:  [phone]    Description:  Female : 1940   Provider:  Corinne Franco A.PRomuloN.           Reason for Visit     Follow-Up Post pet/ct      Allergies as of 7/10/2017     Allergen Noted Reactions    Nkda [No Known Drug Allergy] 2009         You were diagnosed with     Lung nodule   [[id number]]         Vital Signs     Blood Pressure Pulse Temperature Respirations Height Weight    158/98 mmHg 85 37.7 °C (99.9 °F) 18 1.626 m (5' 4\") 62.5 kg (137 lb 12.6 oz)    Body Mass Index Oxygen Saturation Breastfeeding? Smoking Status          23.64 kg/m2 94% No Current Every Day Smoker        Basic Information     Date Of Birth Sex Race Ethnicity Preferred Language    1940 Female White Non- English      Your appointments     2017 11:20 AM   Established Patient with Danny Buchanan M.D.   [facility] [address]    [address]  [address]  [facility] [address]   [phone]           You will be receiving a confirmation call a few days before your appointment from our automated call confirmation system.              Problem List              ICD-10-CM Priority Class Noted - Resolved    CATARACT    Unknown - Present    Tobacco abuse Z72.0   Unknown - Present    Dyslipidemia E78.5   Unknown - Present    Osteopenia M85.80   2014 - Present    IGT (impaired glucose tolerance) R73.02   9/3/2015 - Present    Vitamin D deficiency E55.9   2015 - Present    Chronic obstructive pulmonary disease (CMS-HCC) J44.9   2017 - Present    Lung mass R91.8   2017 - Present      Health Maintenance        Date Due Completion Dates    IMM DTaP/Tdap/Td Vaccine (1 - Tdap) 1958 ---    IMM ZOSTER VACCINE 1999 ---    IMM PNEUMOCOCCAL 65+ (ADULT) LOW/MEDIUM RISK SERIES (1 of 2 - PCV13) 2004 ---    IMM INFLUENZA (1) 2017 ---    MAMMOGRAM " 1/18/2018 1/18/2017, 12/19/2016, 9/23/2015, 3/11/2013, 1/14/2011, 10/19/2009, 10/19/2009    BONE DENSITY 12/19/2021 12/19/2016, 12/12/2013, 8/9/2011    COLONOSCOPY 2/26/2023 2/26/2013 (Declined)    Override on 2/26/2013: Patient Declined            Current Immunizations     No immunizations on file.      Below and/or attached are the medications your provider expects you to take. Review all of your home medications and newly ordered medications with your provider and/or pharmacist. Follow medication instructions as directed by your provider and/or pharmacist. Please keep your medication list with you and share with your provider. Update the information when medications are discontinued, doses are changed, or new medications (including over-the-counter products) are added; and carry medication information at all times in the event of emergency situations     Allergies:  NKDA - (reactions not documented)               Medications  Valid as of: July 10, 2017 -  2:20 PM    Generic Name Brand Name Tablet Size Instructions for use    Albuterol Sulfate (Aero Soln) albuterol 108 (90 BASE) MCG/ACT Inhale 2 Puffs by mouth every four hours as needed for Shortness of Breath.        Cholecalciferol (Tab) cholecalciferol 1000 UNIT Take 1,000 Units by mouth every day.        Ibuprofen (Tab) MOTRIN 800 MG Take 800 mg by mouth every 8 hours as needed.        Oyster Shell (Tab) OS-MARCO A 500 500 MG Take 500 mg by mouth 2 times a day, with meals.        Simvastatin (Tab) ZOCOR 40 MG TAKE 1 TABLET BY MOUTH ONCE DAILY        .                 Medicines prescribed today were sent to:     [facility] [address] [place] [address] [place] - [address]    [address] [facility] [place] [address]    Phone: [phone] Fax: [phone]    Open 24 Hours?: No      Medication refill instructions:       If your prescription bottle indicates you have medication refills left, it is not necessary to call your provider’s office. Please contact your pharmacy and they will  refill your medication.    If your prescription bottle indicates you do not have any refills left, you may request refills at any time through one of the following ways: The online [employer] system (except Urgent Care), by calling your provider’s office, or by asking your pharmacy to contact your provider’s office with a refill request. Medication refills are processed only during regular business hours and may not be available until the next business day. Your provider may request additional information or to have a follow-up visit with you prior to refilling your medication.   *Please Note: Medication refills are assigned a new Rx number when refilled electronically. Your pharmacy may indicate that no refills were authorized even though a new prescription for the same medication is available at the pharmacy. Please request the medicine by name with the pharmacy before contacting your provider for a refill.        Your To Do List     Future Labs/Procedures Complete By Expires    CT-NEEDLE BX-LUNG-MEDIASTINUM RIGHT  As directed 7/10/2018         [facility] Status: Patient Declined        Quit Tobacco Information     Do you want to quit using tobacco?    Quitting tobacco decreases risks of cancer, heart and lung disease, increases life expectancy, improves sense of taste and smell, and increases spending money, among other benefits.    If you are thinking about quitting, we can help.  • [employer] Quit Tobacco Program: 491.791.5285  o Program occurs weekly for four weeks and includes pharmacist consultation on products to support quitting smoking or chewing tobacco. A provider referral is needed for pharmacist consultation.  • Tobacco Users Help Hotline: 4-565-QUIT-NOW (184-5260) or https://[place].quitlogix.org/  o Free, confidential telephone and online coaching for [place] residents. Sessions are designed on a schedule that is convenient for you. Eligible clients receive free nicotine replacement therapy.  • Nationally:  www.smokefree.gov  o Information and professional assistance to support both immediate and long-term needs as you become, and remain, a non-smoker. Smokefree.gov allows you to choose the help that best fits your needs.

## 2017-07-10 NOTE — PROGRESS NOTES
Subjective:      Cheli Parks is a 76 y.o. female who presents for Follow-Up for PET CT results.         HPI    Patient seen today in follow up for PET CT results. She is accompanied by herself to today's appointment. Patient is very anxious regarding the PET/CT results.    Patient stated she tolerated PET/CT well. She said it wasn't as bad as she initially thought it was going to be. PET/CT did show abnormal uptake within the spiculated masslike opacities in the right lower lobe. The mass measures 2.7 x 2.2 cm in size with a maximum SUV of 13. There is no enlarged mediastinal or hilar nodes noted on the PET/CT and no abnormal mediastinal or hilar uptake to suggest any intrathoracic metastasis. There is an uptake within a borderline enlarged right level II jugulodigastric lymph node which could be possibly metastatic or inflammatory. I did review the results in detail as well as reviewed with the patient today.    Allergies   Allergen Reactions   • Nkda [No Known Drug Allergy]      Current Outpatient Prescriptions on File Prior to Visit   Medication Sig Dispense Refill   • calcium carbonate (CALCIUM CARBONATE) 500 MG Tab Take 500 mg by mouth 2 times a day, with meals.     • simvastatin (ZOCOR) 40 MG Tab TAKE 1 TABLET BY MOUTH ONCE DAILY 90 Tab 3   • vitamin D (CHOLECALCIFEROL) 1000 UNIT Tab Take 1,000 Units by mouth every day.     • albuterol 108 (90 BASE) MCG/ACT Aero Soln inhalation aerosol Inhale 2 Puffs by mouth every four hours as needed for Shortness of Breath. 1 Inhaler 6   • ibuprofen (MOTRIN) 800 MG Tab Take 800 mg by mouth every 8 hours as needed.       No current facility-administered medications on file prior to visit.       Review of Systems   Constitutional: Negative for fever and chills.   Respiratory: Negative for cough, shortness of breath and wheezing.    Gastrointestinal: Negative for nausea, vomiting, diarrhea and constipation.          Objective:     /98 mmHg  Pulse 85   "Temp(Src) 37.7 °C (99.9 °F)  Resp 18  Ht 1.626 m (5' 4\")  Wt 62.5 kg (137 lb 12.6 oz)  BMI 23.64 kg/m2  SpO2 94%  Breastfeeding? No     Physical Exam   Constitutional: She is oriented to person, place, and time. She appears well-developed and well-nourished.   Cardiovascular: Normal rate, regular rhythm and normal heart sounds.    Pulmonary/Chest: Effort normal and breath sounds normal. No respiratory distress. She has no wheezes.   Musculoskeletal: Normal range of motion.   Neurological: She is alert and oriented to person, place, and time.   Skin: Skin is warm and dry.   Psychiatric: She has a normal mood and affect. Her behavior is normal.   Vitals reviewed.         Je-sbocs-wvhai Base To Mid-thigh    7/6/2017 7/6/2017 11:43 AM HISTORY/REASON FOR EXAM:  Right lower lobe spiculated mass seen on recent chest CT. History of COPD. TECHNIQUE/EXAM DESCRIPTION AND NUMBER OF VIEWS: PET body imaging. Initially, 13.8 mCi F-18 FDG was administered intravenously under standardized conditions.  Approximately 45 minutes after FDG administration, the patient was placed in the supine position on the PET CT table. Blood glucose level was 81mg/dL. Low dose spiral CT imaging was performed from the skull base to the mid thighs. PET imaging was then performed from the skull base to the mid thighs. CT images, PET images, and PET/CT fused images were reviewed on a PACS 3D workstation. The limited non-contrast CT data are used primarily for attenuation correction and anatomic correlation.  Evaluation of solid organs and bowel are especially limited utilizing this technique. COMPARISON: Chest CT 6/21/2017 FINDINGS: Head and Neck:  There is focal uptake within a right level 2 jugulodigastric lymph node just anterior to the right parotid gland measuring 9 mm with maximum SUV of 9.8 on image 40. Thorax:  There are multiple scattered small be a subtle lymph nodes but they do not demonstrate increased uptake. There is no definite " abnormal hilar uptake. There is uptake within the focal spiculated masslike opacity in the posterior right lower lobe medially measuring 3.7 x 2.2 cm with maximum SUV of 13. A portion of this masslike opacity extends more inferiorly in the posterior right lower lobe and does not demonstrate increased uptake. There is minimal surrounding groundglass opacity which does not demonstrate uptake. Abdomen/Pelvis:   There is no abnormal FDG uptake. Musculoskeletal:  There is no suspicious abnormal FDG uptake. Additional CT findings:  There are multiple hepatic cysts. There is atherosclerosis.     7/6/2017  1.  There is abnormal uptake within the spiculated masslike opacity in the posterior medial right lower lobe, however the more inferior extension of the opacity does not demonstrate uptake. Differential would include bronchogenic carcinoma versus an acute pneumonia. Biopsy is recommended for further confirmation of malignancy. 2.  There are no enlarged mediastinal or hilar nodes or abnormal mediastinal or hilar uptake to suggest intrathoracic metastases. 3.  There is uptake within a borderline enlarged right level 2 jugulodigastric lymph node which could be metastatic or inflammatory. 4.  There are multiple simple appearing hepatic cysts.       Assessment/Plan:     1. Lung nodule  CT-NEEDLE BX-LUNG-MEDIASTINUM RIGHT     Plan  1. Patient with a highly suspicious nodule in the right lower lobe, with abnormal uptake measuring 3.7 x 2.2 cm in size. SUV on PET CT is 13. I discussed the results in detail with the patient and recommendation to proceed with a CT-guided lung biopsy. Patient is very anxious regarding the biopsy but is in agreement to proceed with the plan. She stated that she would like to wait just a few weeks to have the biopsy. We will attempt to schedule her biopsy on Monday, July 24 with a follow-up with me in the clinic 2 days after the biopsy. After significant planning and discussing the process and plan  with the patient she has agreed to proceed with the biopsy.

## 2017-07-12 ENCOUNTER — TELEPHONE (OUTPATIENT)
Dept: HEMATOLOGY ONCOLOGY | Facility: MEDICAL CENTER | Age: 77
End: 2017-07-12

## 2017-07-12 NOTE — TELEPHONE ENCOUNTER
I called Left message for her to return my call regarding the follow up post biopsy with Corinne LEA RN. So when she call I need to spoke with her so I can setup any appointment with Corinne LEA RN

## 2017-07-20 DIAGNOSIS — Z01.812 PRE-OPERATIVE LABORATORY EXAMINATION: ICD-10-CM

## 2017-07-20 LAB
INR PPP: 0.9 (ref 0.87–1.13)
PLATELET # BLD AUTO: 191 K/UL (ref 164–446)
PROTHROMBIN TIME: 12.4 SEC (ref 12–14.6)

## 2017-07-20 PROCEDURE — 36415 COLL VENOUS BLD VENIPUNCTURE: CPT

## 2017-07-20 PROCEDURE — 85049 AUTOMATED PLATELET COUNT: CPT

## 2017-07-20 PROCEDURE — 85610 PROTHROMBIN TIME: CPT

## 2017-07-24 ENCOUNTER — HOSPITAL ENCOUNTER (OUTPATIENT)
Facility: MEDICAL CENTER | Age: 77
End: 2017-07-24
Attending: INTERNAL MEDICINE | Admitting: INTERNAL MEDICINE
Payer: MEDICARE

## 2017-07-24 ENCOUNTER — APPOINTMENT (OUTPATIENT)
Dept: RADIOLOGY | Facility: MEDICAL CENTER | Age: 77
End: 2017-07-24
Attending: NURSE PRACTITIONER
Payer: MEDICARE

## 2017-07-24 ENCOUNTER — APPOINTMENT (OUTPATIENT)
Dept: RADIOLOGY | Facility: MEDICAL CENTER | Age: 77
End: 2017-07-24
Attending: RADIOLOGY
Payer: MEDICARE

## 2017-07-24 VITALS
HEART RATE: 74 BPM | RESPIRATION RATE: 22 BRPM | OXYGEN SATURATION: 96 % | WEIGHT: 138.45 LBS | HEIGHT: 64 IN | SYSTOLIC BLOOD PRESSURE: 163 MMHG | TEMPERATURE: 97.2 F | DIASTOLIC BLOOD PRESSURE: 72 MMHG | BODY MASS INDEX: 23.64 KG/M2

## 2017-07-24 DIAGNOSIS — R91.1 LUNG NODULE: ICD-10-CM

## 2017-07-24 PROCEDURE — 71010 DX-CHEST-PORTABLE (1 VIEW): CPT

## 2017-07-24 PROCEDURE — 88341 IMHCHEM/IMCYTCHM EA ADD ANTB: CPT

## 2017-07-24 PROCEDURE — 88305 TISSUE EXAM BY PATHOLOGIST: CPT

## 2017-07-24 PROCEDURE — 32405 CT-BIOPSY-LUNG/MEDIASTINUM: CPT | Mod: RT

## 2017-07-24 PROCEDURE — 700111 HCHG RX REV CODE 636 W/ 250 OVERRIDE (IP): Performed by: RADIOLOGY

## 2017-07-24 PROCEDURE — 88342 IMHCHEM/IMCYTCHM 1ST ANTB: CPT

## 2017-07-24 PROCEDURE — 160002 HCHG RECOVERY MINUTES (STAT)

## 2017-07-24 PROCEDURE — 700111 HCHG RX REV CODE 636 W/ 250 OVERRIDE (IP)

## 2017-07-24 RX ORDER — ONDANSETRON 2 MG/ML
4 INJECTION INTRAMUSCULAR; INTRAVENOUS EVERY 8 HOURS PRN
Status: DISCONTINUED | OUTPATIENT
Start: 2017-07-24 | End: 2017-07-24 | Stop reason: HOSPADM

## 2017-07-24 RX ORDER — SODIUM CHLORIDE 9 MG/ML
INJECTION, SOLUTION INTRAVENOUS
Status: DISCONTINUED | OUTPATIENT
Start: 2017-07-24 | End: 2017-07-24 | Stop reason: HOSPADM

## 2017-07-24 RX ORDER — MIDAZOLAM HYDROCHLORIDE 1 MG/ML
INJECTION INTRAMUSCULAR; INTRAVENOUS
Status: DISCONTINUED
Start: 2017-07-24 | End: 2017-07-24 | Stop reason: HOSPADM

## 2017-07-24 RX ORDER — OXYCODONE HYDROCHLORIDE 5 MG/1
10 TABLET ORAL
Status: DISCONTINUED | OUTPATIENT
Start: 2017-07-24 | End: 2017-07-24 | Stop reason: HOSPADM

## 2017-07-24 RX ORDER — HYDRALAZINE HYDROCHLORIDE 20 MG/ML
INJECTION INTRAMUSCULAR; INTRAVENOUS
Status: DISCONTINUED
Start: 2017-07-24 | End: 2017-07-24 | Stop reason: HOSPADM

## 2017-07-24 RX ORDER — MORPHINE SULFATE 4 MG/ML
4 INJECTION, SOLUTION INTRAMUSCULAR; INTRAVENOUS
Status: DISCONTINUED | OUTPATIENT
Start: 2017-07-24 | End: 2017-07-24 | Stop reason: HOSPADM

## 2017-07-24 RX ORDER — SODIUM CHLORIDE 9 MG/ML
500 INJECTION, SOLUTION INTRAVENOUS
Status: ACTIVE | OUTPATIENT
Start: 2017-07-24 | End: 2017-07-24

## 2017-07-24 RX ORDER — HYDRALAZINE HYDROCHLORIDE 20 MG/ML
10 INJECTION INTRAMUSCULAR; INTRAVENOUS ONCE
Status: COMPLETED | OUTPATIENT
Start: 2017-07-24 | End: 2017-07-24

## 2017-07-24 RX ORDER — MIDAZOLAM HYDROCHLORIDE 1 MG/ML
INJECTION INTRAMUSCULAR; INTRAVENOUS
Status: COMPLETED
Start: 2017-07-24 | End: 2017-07-24

## 2017-07-24 RX ORDER — OXYCODONE HYDROCHLORIDE 5 MG/1
5 TABLET ORAL
Status: DISCONTINUED | OUTPATIENT
Start: 2017-07-24 | End: 2017-07-24 | Stop reason: HOSPADM

## 2017-07-24 RX ORDER — MIDAZOLAM HYDROCHLORIDE 1 MG/ML
.5-2 INJECTION INTRAMUSCULAR; INTRAVENOUS PRN
Status: ACTIVE | OUTPATIENT
Start: 2017-07-24 | End: 2017-07-24

## 2017-07-24 RX ORDER — NALOXONE HYDROCHLORIDE 0.4 MG/ML
INJECTION, SOLUTION INTRAMUSCULAR; INTRAVENOUS; SUBCUTANEOUS
Status: COMPLETED
Start: 2017-07-24 | End: 2017-07-24

## 2017-07-24 RX ORDER — ONDANSETRON 2 MG/ML
4 INJECTION INTRAMUSCULAR; INTRAVENOUS PRN
Status: ACTIVE | OUTPATIENT
Start: 2017-07-24 | End: 2017-07-24

## 2017-07-24 RX ADMIN — MIDAZOLAM 2 MG: 1 INJECTION INTRAMUSCULAR; INTRAVENOUS at 12:03

## 2017-07-24 RX ADMIN — SODIUM CHLORIDE: 9 INJECTION, SOLUTION INTRAVENOUS at 09:15

## 2017-07-24 RX ADMIN — MIDAZOLAM 1 MG: 1 INJECTION INTRAMUSCULAR; INTRAVENOUS at 12:15

## 2017-07-24 RX ADMIN — FENTANYL CITRATE 50 MCG: 50 INJECTION, SOLUTION INTRAMUSCULAR; INTRAVENOUS at 12:03

## 2017-07-24 RX ADMIN — HYDRALAZINE HYDROCHLORIDE 10 MG: 20 INJECTION INTRAMUSCULAR; INTRAVENOUS at 16:06

## 2017-07-24 ASSESSMENT — PAIN SCALES - GENERAL
PAINLEVEL_OUTOF10: 0

## 2017-07-24 NOTE — DISCHARGE INSTRUCTIONS
ACTIVITY: Rest and take it easy for the first 24 hours.  A responsible adult is recommended to remain with you during that time.  It is normal to feel sleepy.  We encourage you to not do anything that requires balance, judgment or coordination.    MILD FLU-LIKE SYMPTOMS ARE NORMAL. YOU MAY EXPERIENCE GENERALIZED MUSCLE ACHES, THROAT IRRITATION, HEADACHE AND/OR SOME NAUSEA.    FOR 24 HOURS DO NOT:  Drive, operate machinery or run household appliances.  Drink beer or alcoholic beverages.   Make important decisions or sign legal documents.    SPECIAL INSTRUCTIONS: follow up with primary care physician as needed  Call Dr Laughlin with any questions [phone]  If you experience chest pain, s.o.b, call 911 return to ER     DIET: To avoid nausea, slowly advance diet as tolerated, avoiding spicy or greasy foods for the first day.  Add more substantial food to your diet according to your physician's instructions.  Babies can be fed formula or breast milk as soon as they are hungry.  INCREASE FLUIDS AND FIBER TO AVOID CONSTIPATION.    SURGICAL DRESSING/BATHING: keep band aid clean dry intact for 24 hours, you may remove band aid after 24 hours.    FOLLOW-UP APPOINTMENT:  A follow-up appointment should be arranged with your doctor in [phone]; call to schedule.    You should CALL YOUR PHYSICIAN if you develop:  Fever greater than 101 degrees F.  Pain not relieved by medication, or persistent nausea or vomiting.  Excessive bleeding (blood soaking through dressing) or unexpected drainage from the wound.  Extreme redness or swelling around the incision site, drainage of pus or foul smelling drainage.  Inability to urinate or empty your bladder within 8 hours.  Problems with breathing or chest pain.    You should call 911 if you develop problems with breathing or chest pain.  If you are unable to contact your doctor or surgical center, you should go to the nearest emergency room or urgent care center.  Physician's telephone #:  [phone]    If any questions arise, call your doctor.  If your doctor is not available, please feel free to call the Surgical Center at [phone].  The Center is open Monday through Friday from 7AM to 7PM.  You can also call the HEALTH HOTLINE open 24 hours/day, 7 days/week and speak to a nurse at [phone], or toll free at [phone].    A registered nurse may call you a few days after your surgery to see how you are doing after your procedure.    MEDICATIONS: Resume taking daily medication.  Take prescribed pain medication with food.  If no medication is prescribed, you may take non-aspirin pain medication if needed.  PAIN MEDICATION CAN BE VERY CONSTIPATING.  Take a stool softener or laxative such as senokot, pericolace, or milk of magnesia if needed.  Biopsy  Care After  These instructions give you information on caring for yourself after your procedure. Your doctor may also give you more specific instructions. Call your doctor if you have any problems or questions after your procedure.  HOME CARE   · Return to your normal diet and activities as told by your doctor.  · Change your bandages (dressings) as told by your doctor. If skin glue (adhesive) was used, it will peel off in 7 days.  · Only take medicines as told by your doctor.  · Ask your doctor when you can bathe and get your wound wet.  GET HELP RIGHT AWAY IF:  · You see more than a small spot of blood coming from the wound.  · You have redness, puffiness (swelling), or pain.  · You see yellowish-white fluid (pus) coming from the wound.  · You have a fever.  · You notice a bad smell coming from the wound or bandage.  · You have a rash, trouble breathing, or any allergy problems.  MAKE SURE YOU:   · Understand these instructions.  · Will watch your condition.  · Will get help right away if you are not doing well or get worse.  Document Released: 08/21/2012 Document Revised: 03/11/2013 Document Reviewed: 08/21/2012  ExitCare® Patient Information  ©2014 [facility].  Lung Biopsy  A lung biopsy is a procedure in which a tissue sample is removed from the lung. The tissue can be examined under a microscope to help diagnose various lung disorders.   LET YOUR HEALTH CARE PROVIDER KNOW ABOUT:  · Any allergies you have.  · All medicines you are taking, including vitamins, herbs, eye drops, creams, and over-the-counter medicines.  · Previous problems you or members of your family have had with the use of anesthetics.  · Any blood disorders or bleeding problems that you have.  · Previous surgeries you have had.  · Medical conditions you have.  RISKS AND COMPLICATIONS  Generally, a lung biopsy is a safe procedure. However, problems can occur and include:  · Collapse of the lung.    · Bleeding.    · Infection.    BEFORE THE PROCEDURE  · Do not eat or drink anything after midnight on the night before the procedure or as directed by your health care provider.  · Ask your health care provider about changing or stopping your regular medicines. This is especially important if you are taking diabetes medicines or blood thinners.  · Plan to have someone take you home after the procedure.  PROCEDURE  Various methods can be used to perform a lung biopsy:   · Needle biopsy. A biopsy needle is inserted into the lung. The needle is used to collect the tissue sample. A CT scanner may be used to guide the needle to the right place in the lung. For this method, a medicine is used to numb the area where the biopsy sample will be taken (local anesthetic).  · Bronchoscopy. A flexible tube (bronchoscope) is inserted into your lungs by going through your mouth or nose. A needle or forceps is passed through the bronchoscope to remove the tissue sample. For this method, medicine may be used to numb the back of your throat.  · Open biopsy. A cut (incision) is made in your chest. The tissue sample is then removed using surgical tools. The incision is closed with skin glue, skin adhesive  strips, or stitches. For this method, you will be given medicine to make you sleep through the procedure (general anesthetic).  AFTER THE PROCEDURE  · Your recovery will be assessed and monitored.  · You might have soreness and tenderness at the site of the biopsy for a few days after the procedure.  · You might have a cough and some soreness in your throat for a few days if a bronchoscope was used.     This information is not intended to replace advice given to you by your health care provider. Make sure you discuss any questions you have with your health care provider.     Document Released: 03/08/2006 Document Revised: 01/08/2016 Document Reviewed: 06/01/2014  [employer] Interactive Patient Education ©2016 [employer]      If your physician has prescribed pain medication that includes Acetaminophen (Tylenol), do not take additional Acetaminophen (Tylenol) while taking the prescribed medication.    Depression / Suicide Risk    As you are discharged from this [facility] facility, it is important to learn how to keep safe from harming yourself.    Recognize the warning signs:  · Abrupt changes in personality, positive or negative- including increase in energy   · Giving away possessions  · Change in eating patterns- significant weight changes-  positive or negative  · Change in sleeping patterns- unable to sleep or sleeping all the time   · Unwillingness or inability to communicate  · Depression  · Unusual sadness, discouragement and loneliness  · Talk of wanting to die  · Neglect of personal appearance   · Rebelliousness- reckless behavior  · Withdrawal from people/activities they love  · Confusion- inability to concentrate     If you or a loved one observes any of these behaviors or has concerns about self-harm, here's what you can do:  · Talk about it- your feelings and reasons for harming yourself  · Remove any means that you might use to hurt yourself (examples: pills, rope, extension cords, firearm)  · Get  professional help from the community (Mental Health, Substance Abuse, psychological counseling)  · Do not be alone:Call your Safe Contact- someone whom you trust who will be there for you.  · Call your local CRISIS HOTLINE 261-3572 or 694-419-2457  · Call your local Children's Mobile Crisis Response Team [place] (312) 654-8747 or www.[employer]  · Call the toll free National Suicide Prevention Hotlines   · National Suicide Prevention Lifeline 760-233-FIWM (6008)  · National Hope Line Network 800-SUICIDE (325-7140)

## 2017-07-24 NOTE — IP AVS SNAPSHOT
7/24/2017    Cheli Mcginnis Charly  [address]  [place] [address]    Dear Cheli:    [facility] wants to ensure your discharge home is safe and you or your loved ones have had all of your questions answered regarding your care after you leave the hospital.    Below is a list of resources and contact information should you have any questions regarding your hospital stay, follow-up instructions, or active medical symptoms.    Questions or Concerns Regarding… Contact   Medical Questions Related to Your Discharge  (7 days a week, 8am-5pm) Contact a Nurse Care Coordinator   [phone]   Medical Questions Not Related to Your Discharge  (24 hours a day / 7 days a week)  Contact the Nurse Health Line   [phone]    Medications or Discharge Instructions Refer to your discharge packet   or contact your [facility] Primary Care Provider   [phone]   Follow-up Appointment(s) Schedule your appointment via [employer]   or contact Scheduling [phone]   Billing Review your statement via [employer]  or contact Billing [phone]   Medical Records Review your records via [employer]   or contact Medical Records [phone]     You may receive a telephone call within two days of discharge. This call is to make certain you understand your discharge instructions and have the opportunity to have any questions answered. You can also easily access your medical information, test results and upcoming appointments via the [employer] free online health management tool. You can learn more and sign up at [employer]. For assistance setting up your [employer] account, please call [phone].    Once again, we want to ensure your discharge home is safe and that you have a clear understanding of any next steps in your care. If you have any questions or concerns, please do not hesitate to contact us, we are here for you. Thank you for choosing [facility] for your healthcare needs.    Sincerely,    Your [facility] Healthcare Team

## 2017-07-24 NOTE — PROGRESS NOTES
Patient underwent a Right lower lung biopsy by Dr. Laughlin. Pt remained hemodynamically stable during procedure. Report called to Lester RIVERA. Pt transported by rjeanette to [facility]. [facility] hand delivered to lab.

## 2017-07-24 NOTE — OR SURGEON
Immediate Post- Operative Note        PostOp Diagnosis: R lung mass.       Procedure(s): CT guided biopsy.       Estimated Blood Loss: Less than 5 ml        Complications: None            7/24/2017     11:56 AM     Joss Laughlin

## 2017-07-24 NOTE — OR NURSING
1230 patient arrived fro IR s/p right lung biopsy posterior approach band aid clean dry intact, patient denies pain, s.o.b, n/v, vss, plan of care discussed with patient agreeable.  1420 pt tolerating oral fluids and snacks.  1600 bp high notified Dr Laughlin order received for bp meds see MAR  1630 discharge instructions given to patient, patient verbalize understanding of the orders, iv discontinued tip intact, prior to dc vss, no c/o cp, s.o.b, band aid right posterior chest clean dry intact  1640 patient escorted via walking with all her personal belongings.

## 2017-07-24 NOTE — IP AVS SNAPSHOT
" Home Care Instructions                                                                                                                Name:Cheli Parks  Medical Record Number:[id number]  CSN: [id number]    YOB: 1940   Age: 76 y.o.  Sex: female  HT:1.626 m (5' 4\") WT: 62.8 kg (138 lb 7.2 oz)          Admit Date: 7/24/2017     Discharge Date:   Today's Date: 7/24/2017  Attending Doctor:  Bal Wilson M.D.                  Allergies:  Nkda                Discharge Instructions         ACTIVITY: Rest and take it easy for the first 24 hours.  A responsible adult is recommended to remain with you during that time.  It is normal to feel sleepy.  We encourage you to not do anything that requires balance, judgment or coordination.    MILD FLU-LIKE SYMPTOMS ARE NORMAL. YOU MAY EXPERIENCE GENERALIZED MUSCLE ACHES, THROAT IRRITATION, HEADACHE AND/OR SOME NAUSEA.    FOR 24 HOURS DO NOT:  Drive, operate machinery or run household appliances.  Drink beer or alcoholic beverages.   Make important decisions or sign legal documents.    SPECIAL INSTRUCTIONS: follow up with primary care physician as needed  Call Dr Laughlin with any questions [phone]  If you experience chest pain, s.o.b, call 911 return to ER     DIET: To avoid nausea, slowly advance diet as tolerated, avoiding spicy or greasy foods for the first day.  Add more substantial food to your diet according to your physician's instructions.  Babies can be fed formula or breast milk as soon as they are hungry.  INCREASE FLUIDS AND FIBER TO AVOID CONSTIPATION.    SURGICAL DRESSING/BATHING: keep band aid clean dry intact for 24 hours, you may remove band aid after 24 hours.    FOLLOW-UP APPOINTMENT:  A follow-up appointment should be arranged with your doctor in [phone]; call to schedule.    You should CALL YOUR PHYSICIAN if you develop:  Fever greater than 101 degrees F.  Pain not relieved by medication, or persistent nausea or vomiting.  Excessive bleeding " (blood soaking through dressing) or unexpected drainage from the wound.  Extreme redness or swelling around the incision site, drainage of pus or foul smelling drainage.  Inability to urinate or empty your bladder within 8 hours.  Problems with breathing or chest pain.    You should call 911 if you develop problems with breathing or chest pain.  If you are unable to contact your doctor or surgical center, you should go to the nearest emergency room or urgent care center.  Physician's telephone #: [phone]    If any questions arise, call your doctor.  If your doctor is not available, please feel free to call the Surgical Center at [phone].  The Center is open Monday through Friday from 7AM to 7PM.  You can also call the [employer] HOTLINE open 24 hours/day, 7 days/week and speak to a nurse at [phone], or toll free at [phone].    A registered nurse may call you a few days after your surgery to see how you are doing after your procedure.    MEDICATIONS: Resume taking daily medication.  Take prescribed pain medication with food.  If no medication is prescribed, you may take non-aspirin pain medication if needed.  PAIN MEDICATION CAN BE VERY CONSTIPATING.  Take a stool softener or laxative such as senokot, pericolace, or milk of magnesia if needed.  Biopsy  Care After  These instructions give you information on caring for yourself after your procedure. Your doctor may also give you more specific instructions. Call your doctor if you have any problems or questions after your procedure.  HOME CARE   · Return to your normal diet and activities as told by your doctor.  · Change your bandages (dressings) as told by your doctor. If skin glue (adhesive) was used, it will peel off in 7 days.  · Only take medicines as told by your doctor.  · Ask your doctor when you can bathe and get your wound wet.  GET HELP RIGHT AWAY IF:  · You see more than a small spot of blood coming from the wound.  · You have redness,  puffiness (swelling), or pain.  · You see yellowish-white fluid (pus) coming from the wound.  · You have a fever.  · You notice a bad smell coming from the wound or bandage.  · You have a rash, trouble breathing, or any allergy problems.  MAKE SURE YOU:   · Understand these instructions.  · Will watch your condition.  · Will get help right away if you are not doing well or get worse.  Document Released: 08/21/2012 Document Revised: 03/11/2013 Document Reviewed: 08/21/2012  ExitCare® Patient Information ©2014 [employer].  Lung Biopsy  A lung biopsy is a procedure in which a tissue sample is removed from the lung. The tissue can be examined under a microscope to help diagnose various lung disorders.   LET YOUR HEALTH CARE PROVIDER KNOW ABOUT:  · Any allergies you have.  · All medicines you are taking, including vitamins, herbs, eye drops, creams, and over-the-counter medicines.  · Previous problems you or members of your family have had with the use of anesthetics.  · Any blood disorders or bleeding problems that you have.  · Previous surgeries you have had.  · Medical conditions you have.  RISKS AND COMPLICATIONS  Generally, a lung biopsy is a safe procedure. However, problems can occur and include:  · Collapse of the lung.    · Bleeding.    · Infection.    BEFORE THE PROCEDURE  · Do not eat or drink anything after midnight on the night before the procedure or as directed by your health care provider.  · Ask your health care provider about changing or stopping your regular medicines. This is especially important if you are taking diabetes medicines or blood thinners.  · Plan to have someone take you home after the procedure.  PROCEDURE  Various methods can be used to perform a lung biopsy:   · Needle biopsy. A biopsy needle is inserted into the lung. The needle is used to collect the tissue sample. A CT scanner may be used to guide the needle to the right place in the lung. For this method, a medicine is used to  numb the area where the biopsy sample will be taken (local anesthetic).  · Bronchoscopy. A flexible tube (bronchoscope) is inserted into your lungs by going through your mouth or nose. A needle or forceps is passed through the bronchoscope to remove the tissue sample. For this method, medicine may be used to numb the back of your throat.  · Open biopsy. A cut (incision) is made in your chest. The tissue sample is then removed using surgical tools. The incision is closed with skin glue, skin adhesive strips, or stitches. For this method, you will be given medicine to make you sleep through the procedure (general anesthetic).  AFTER THE PROCEDURE  · Your recovery will be assessed and monitored.  · You might have soreness and tenderness at the site of the biopsy for a few days after the procedure.  · You might have a cough and some soreness in your throat for a few days if a bronchoscope was used.     This information is not intended to replace advice given to you by your health care provider. Make sure you discuss any questions you have with your health care provider.     Document Released: 03/08/2006 Document Revised: 01/08/2016 Document Reviewed: 06/01/2014  [employer] Interactive Patient Education ©2016 [employer]      If your physician has prescribed pain medication that includes Acetaminophen (Tylenol), do not take additional Acetaminophen (Tylenol) while taking the prescribed medication.    Depression / Suicide Risk    As you are discharged from this [facility] facility, it is important to learn how to keep safe from harming yourself.    Recognize the warning signs:  · Abrupt changes in personality, positive or negative- including increase in energy   · Giving away possessions  · Change in eating patterns- significant weight changes-  positive or negative  · Change in sleeping patterns- unable to sleep or sleeping all the time   · Unwillingness or inability to communicate  · Depression  · Unusual sadness,  discouragement and loneliness  · Talk of wanting to die  · Neglect of personal appearance   · Rebelliousness- reckless behavior  · Withdrawal from people/activities they love  · Confusion- inability to concentrate     If you or a loved one observes any of these behaviors or has concerns about self-harm, here's what you can do:  · Talk about it- your feelings and reasons for harming yourself  · Remove any means that you might use to hurt yourself (examples: pills, rope, extension cords, firearm)  · Get professional help from the community (Mental Health, Substance Abuse, psychological counseling)  · Do not be alone:Call your Safe Contact- someone whom you trust who will be there for you.  · Call your local CRISIS HOTLINE 634-0612 or 252-503-4578  · Call your local Children's Mobile Crisis Response Team [place] (189) 043-3858 or www.[employer]  · Call the toll free National Suicide Prevention Hotlines   · National Suicide Prevention Lifeline 194-564-FIXI (1639)  · National Hope Line Network 800-SUICIDE (609-7377)       Medication List      CONTINUE taking these medications        Instructions    Morning Afternoon Evening Bedtime    calcium carbonate 500 MG Tabs   Commonly known as:  OS-MARCO A 500        Take 500 mg by mouth 2 times a day, with meals.   Dose:  500 mg                        simvastatin 40 MG Tabs   Commonly known as:  ZOCOR        Doctor's comments:  Patient is requesting a 90 day supply   TAKE 1 TABLET BY MOUTH ONCE DAILY                        vitamin D 1000 UNIT Tabs   Commonly known as:  cholecalciferol        Take 1,000 Units by mouth every day.   Dose:  1000 Units                                Medication Information     Above and/or attached are the medications your physician expects you to take upon discharge. Review all of your home medications and newly ordered medications with your doctor and/or pharmacist. Follow medication instructions as directed by your doctor and/or pharmacist.  Please keep your medication list with you and share with your physician. Update the information when medications are discontinued, doses are changed, or new medications (including over-the-counter products) are added; and carry medication information at all times in the event of emergency situations.        Resources     Quit Smoking / Tobacco Use:    I understand the use of any tobacco products increases my chance of suffering from future heart disease or stroke and could cause other illnesses which may shorten my life. Quitting the use of tobacco products is the single most important thing I can do to improve my health. For further information on smoking / tobacco cessation call a Toll Free Quit Line at 1-657.224.3704 (*National [employer] [place]) or 1-790.586.9203 (American Lung Association) or you can access the web based program at www.lungMoneysoft.org.    [place] Tobacco Users Help Line:  (480) 523-2252       Toll Free: 1-110.219.8085  Quit Tobacco Program [facility] (081)705-1292    Crisis Hotline:    [place] Crisis Hotline:  0-038-BLDAAIJ or 1-812.200.8047    [place] Crisis Hotline:    1-262.541.9257 or 610-192-9361    Discharge Survey:   Thank you for choosing [facility]. We hope we did everything we could to make your hospital stay a pleasant one. You may be receiving a survey and we would appreciate your time and participation in answering the questions. Your input is very valuable to us in our efforts to improve our service to our patients and their families.            Signatures     My signature on this form indicates that:    1. I acknowledge receipt and understanding of these Home Care Instruction.  2. My questions regarding this information have been answered to my satisfaction.  3. I have formulated a plan with my discharge nurse to obtain my prescribed medications for home.    __________________________________      __________________________________                   Patient  Signature                                 Guardian/Responsible Adult Signature      __________________________________                 __________       ________                       Nurse Signature                                               Date                 Time

## 2017-07-26 ENCOUNTER — OFFICE VISIT (OUTPATIENT)
Dept: HEMATOLOGY ONCOLOGY | Facility: MEDICAL CENTER | Age: 77
End: 2017-07-26
Payer: MEDICARE

## 2017-07-26 VITALS
HEIGHT: 64 IN | OXYGEN SATURATION: 93 % | SYSTOLIC BLOOD PRESSURE: 142 MMHG | TEMPERATURE: 99.3 F | WEIGHT: 138.89 LBS | DIASTOLIC BLOOD PRESSURE: 88 MMHG | BODY MASS INDEX: 23.71 KG/M2 | HEART RATE: 84 BPM

## 2017-07-26 DIAGNOSIS — C34.31 MALIGNANT NEOPLASM OF LOWER LOBE OF RIGHT LUNG (HCC): ICD-10-CM

## 2017-07-26 PROCEDURE — 99213 OFFICE O/P EST LOW 20 MIN: CPT | Performed by: NURSE PRACTITIONER

## 2017-07-26 ASSESSMENT — ENCOUNTER SYMPTOMS
COUGH: 0
NERVOUS/ANXIOUS: 1
SHORTNESS OF BREATH: 0
HEMOPTYSIS: 0

## 2017-07-26 ASSESSMENT — PAIN SCALES - GENERAL: PAINLEVEL: NO PAIN

## 2017-07-26 NOTE — PROGRESS NOTES
"Subjective:      Cheli Parks is a 76 y.o. female who presents for Follow-Up for biopsy results.         HPI    Patient is seen today in follow-up for lung biopsy results. 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    Patient stated she tolerated the biopsy very well. She was possibly surprised at how well she did. She denies any shortness of breath, residual pain, hemoptysis, or increasing cough. She is very anxious to get the results.    Results due showed invasive adenocarcinoma of the lung. I did discuss the results in detail with the patient today. She was very tearful with results and is very scared for what is to come. She is not interested in proceeding with any chemotherapy. I did discuss with her the possibility of surgery and a surgical referral. I also did discuss with her the possibility of radiation to referral as well.    Allergies   Allergen Reactions   • Nkda [No Known Drug Allergy]      Current Outpatient Prescriptions on File Prior to Visit   Medication Sig Dispense Refill   • calcium carbonate (CALCIUM CARBONATE) 500 MG Tab Take 500 mg by mouth 2 times a day, with meals.     • simvastatin (ZOCOR) 40 MG Tab TAKE 1 TABLET BY MOUTH ONCE DAILY 90 Tab 3   • vitamin D (CHOLECALCIFEROL) 1000 UNIT Tab Take 1,000 Units by mouth every day.       No current facility-administered medications on file prior to visit.       Review of Systems   Respiratory: Negative for cough, hemoptysis and shortness of breath.    Psychiatric/Behavioral: The patient is nervous/anxious.           Objective:     /88 mmHg  Pulse 84  Temp(Src) 37.4 °C (99.3 °F)  Ht 1.626 m (5' 4\")  Wt 63 kg (138 lb 14.2 oz)  BMI 23.83 kg/m2  SpO2 93%  Breastfeeding? No     Physical Exam   Constitutional: She is oriented to person, place, and time. She appears well-developed and well-nourished.   HENT:   Head: Normocephalic and atraumatic.   Cardiovascular: Normal rate, regular rhythm and normal heart sounds.  "   Pulmonary/Chest: Effort normal. No respiratory distress. She has no wheezes.   Diminished in the right lower lobe   Neurological: She is alert and oriented to person, place, and time.   Psychiatric: She has a normal mood and affect. Her behavior is normal.   Tearful today with the results   Vitals reviewed.         FINAL DIAGNOSIS:    A. Right lung cores:         Invasive adenocarcinoma of the lung.     Assessment/Plan:     1. Malignant neoplasm of lower lobe of right lung (CMS-HCC)  REFERRAL TO GENERAL SURGERY     Plan  1. Patient newly diagnosed with invasive adenocarcinoma of the right lower lobe of the lung. I discussed with her the possibilities of either a surgical referral or radiation oncology referral. She has agreed to proceed with surgical referral this time. She is very anxious and scared about an upcoming surgery but I did express to her to meet with Dr. Ganser for a consultation to discuss further. Patient is in agreement with the plan.    2. On patient's previous PET CT there was an abnormal finding and the right parotid location with an SUV of 9.8. Incision of the nature of this and I will ask to present patient's case at our next lung tumor Board. I did discuss this with the patient and she is in agreement with the plan. Question whether to proceed with a biopsy of this mass/lymph node. I discussed with patient and I will contact her after her case is presented at tumor board.

## 2017-07-26 NOTE — MR AVS SNAPSHOT
"        Cheli Parks   2017 2:00 PM   Office Visit   MRN: [id number]    Department:  [facility]   Dept Phone:  [phone]    Description:  Female : 1940   Provider:  Corinne Franco ARomuloPRomuloN.           Reason for Visit     Follow-Up results biopsy      Allergies as of 2017     Allergen Noted Reactions    Nkda [No Known Drug Allergy] 2009         Vital Signs     Blood Pressure Pulse Temperature Height Weight Body Mass Index    142/88 mmHg 84 37.4 °C (99.3 °F) 1.626 m (5' 4\") 63 kg (138 lb 14.2 oz) 23.83 kg/m2    Oxygen Saturation Breastfeeding? Smoking Status             93% No Current Every Day Smoker         Basic Information     Date Of Birth Sex Race Ethnicity Preferred Language    1940 Female White Non- English      Your appointments     2017 11:20 AM   Established Patient with Danny Buchanan M.D.   [facility] [address]    [address] [facility] [address]  [facility] [id number] [phone]           You will be receiving a confirmation call a few days before your appointment from our automated call confirmation system.              Problem List              ICD-10-CM Priority Class Noted - Resolved    CATARACT    Unknown - Present    Tobacco abuse Z72.0   Unknown - Present    Dyslipidemia E78.5   Unknown - Present    Osteopenia M85.80   2014 - Present    IGT (impaired glucose tolerance) R73.02   9/3/2015 - Present    Vitamin D deficiency E55.9   2015 - Present    Chronic obstructive pulmonary disease (CMS-HCC) J44.9   2017 - Present    Lung mass R91.8   2017 - Present    Solitary pulmonary nodule R91.1   2017 - Present      Health Maintenance        Date Due Completion Dates    IMM DTaP/Tdap/Td Vaccine (1 - Tdap) 1958 ---    IMM ZOSTER VACCINE 1999 ---    IMM PNEUMOCOCCAL 65+ (ADULT) LOW/MEDIUM RISK SERIES (1 of 2 - PCV13) 2004 ---    IMM INFLUENZA (1) 2017 ---    MAMMOGRAM 2018 " 1/18/2017, 12/19/2016, 9/23/2015, 3/11/2013, 1/14/2011, 10/19/2009, 10/19/2009    BONE DENSITY 12/19/2021 12/19/2016, 12/12/2013, 8/9/2011    COLONOSCOPY 2/26/2023 2/26/2013 (Declined)    Override on 2/26/2013: Patient Declined            Current Immunizations     No immunizations on file.      Below and/or attached are the medications your provider expects you to take. Review all of your home medications and newly ordered medications with your provider and/or pharmacist. Follow medication instructions as directed by your provider and/or pharmacist. Please keep your medication list with you and share with your provider. Update the information when medications are discontinued, doses are changed, or new medications (including over-the-counter products) are added; and carry medication information at all times in the event of emergency situations     Allergies:  NKDA - (reactions not documented)               Medications  Valid as of: July 26, 2017 -  2:46 PM    Generic Name Brand Name Tablet Size Instructions for use    Cholecalciferol (Tab) cholecalciferol 1000 UNIT Take 1,000 Units by mouth every day.        Oyster Shell (Tab) OS-MARCO A 500 500 MG Take 500 mg by mouth 2 times a day, with meals.        Simvastatin (Tab) ZOCOR 40 MG TAKE 1 TABLET BY MOUTH ONCE DAILY        .                 Medicines prescribed today were sent to:     [facility] [address] [place] [address] [address] [facility]    [address] [place] [address]    Phone: [phone] Fax: [phone]    Open 24 Hours?: No      Medication refill instructions:       If your prescription bottle indicates you have medication refills left, it is not necessary to call your provider’s office. Please contact your pharmacy and they will refill your medication.    If your prescription bottle indicates you do not have any refills left, you may request refills at any time through one of the following ways: The online [employer] system (except Urgent Care), by calling your  provider’s office, or by asking your pharmacy to contact your provider’s office with a refill request. Medication refills are processed only during regular business hours and may not be available until the next business day. Your provider may request additional information or to have a follow-up visit with you prior to refilling your medication.   *Please Note: Medication refills are assigned a new Rx number when refilled electronically. Your pharmacy may indicate that no refills were authorized even though a new prescription for the same medication is available at the pharmacy. Please request the medicine by name with the pharmacy before contacting your provider for a refill.           MyChart Status: Patient Declined        Quit Tobacco Information     Do you want to quit using tobacco?    Quitting tobacco decreases risks of cancer, heart and lung disease, increases life expectancy, improves sense of taste and smell, and increases spending money, among other benefits.    If you are thinking about quitting, we can help.  • [facility] Quit Tobacco Program: [phone]  o Program occurs weekly for four weeks and includes pharmacist consultation on products to support quitting smoking or chewing tobacco. A provider referral is needed for pharmacist consultation.  • Tobacco Users Help Hotline: 0-402-QUIT-NOW (028-8142) or https://[place].quitlogix.org/  o Free, confidential telephone and online coaching for [place] residents. Sessions are designed on a schedule that is convenient for you. Eligible clients receive free nicotine replacement therapy.  • Nationally: www.smokefree.gov  o Information and professional assistance to support both immediate and long-term needs as you become, and remain, a non-smoker. Smokefree.gov allows you to choose the help that best fits your needs.

## 2017-08-01 ENCOUNTER — OFFICE VISIT (OUTPATIENT)
Dept: URGENT CARE | Facility: CLINIC | Age: 77
End: 2017-08-01
Payer: MEDICARE

## 2017-08-01 ENCOUNTER — HOSPITAL ENCOUNTER (OUTPATIENT)
Dept: RADIOLOGY | Facility: MEDICAL CENTER | Age: 77
End: 2017-08-01
Attending: PHYSICIAN ASSISTANT
Payer: MEDICARE

## 2017-08-01 VITALS
DIASTOLIC BLOOD PRESSURE: 80 MMHG | SYSTOLIC BLOOD PRESSURE: 138 MMHG | WEIGHT: 138 LBS | RESPIRATION RATE: 20 BRPM | TEMPERATURE: 99.3 F | OXYGEN SATURATION: 94 % | HEART RATE: 95 BPM | BODY MASS INDEX: 23.56 KG/M2 | HEIGHT: 64 IN

## 2017-08-01 DIAGNOSIS — R06.02 SHORTNESS OF BREATH: ICD-10-CM

## 2017-08-01 DIAGNOSIS — J18.9 PNEUMONIA OF RIGHT LOWER LOBE DUE TO INFECTIOUS ORGANISM: ICD-10-CM

## 2017-08-01 PROCEDURE — 71020 DX-CHEST-2 VIEWS: CPT

## 2017-08-01 PROCEDURE — 99214 OFFICE O/P EST MOD 30 MIN: CPT | Performed by: PHYSICIAN ASSISTANT

## 2017-08-01 RX ORDER — LEVOFLOXACIN 750 MG/1
750 TABLET, FILM COATED ORAL DAILY
Qty: 5 TAB | Refills: 0 | Status: SHIPPED | OUTPATIENT
Start: 2017-08-01 | End: 2017-08-06

## 2017-08-01 ASSESSMENT — ENCOUNTER SYMPTOMS
CHILLS: 0
WHEEZING: 1
SHORTNESS OF BREATH: 1
PALPITATIONS: 0
HEMOPTYSIS: 0
SPUTUM PRODUCTION: 0
COUGH: 1
FEVER: 0
SORE THROAT: 0

## 2017-08-01 NOTE — PATIENT INSTRUCTIONS
Pneumonia, Adult  Pneumonia is an infection of the lungs.   CAUSES  Pneumonia may be caused by bacteria or a virus. Usually, the infection is caused by breathing in droplets from an infected person's cough or sneeze.   SYMPTOMS   Symptoms of pneumonia include:  · Cough.  · Fever.  · Chest pain.  · Rapid breathing.  · Shortness of breath.  · Shaking chills.  · Mucus production.  DIAGNOSIS   If you have the common symptoms of pneumonia, often your health care provider will confirm the diagnosis with a chest X-ray. The X-ray will show an abnormality in the lung if you have pneumonia. Other tests may be done on your blood, urine, or mucus (sputum) to find the specific cause of your pneumonia. A blood gas test or pulse oximetry test may be needed to check how well your lungs are working.  TREATMENT   Your treatment will depend on whether your pneumonia is caused by bacteria or a virus.   · Bacterial pneumonia is treated with antibiotic medicine.  · Pneumonia that is caused by the influenza virus may be treated with an antiviral medicine.  · Pneumonia that is caused by a virus other than influenza will not respond to antibiotic medicine. This type of pneumonia will have to run its course.   HOME CARE INSTRUCTIONS   · Cough suppressants may be used if you are losing too much rest from coughing at night. However, you should try to avoid taking cough suppresants. This is because coughing helps to remove mucus from your lungs.  · Sleep in a semi-upright position at night. Try sleeping in a reclining chair, or place a few pillows under your head.  · Try using a cold steam vaporizer or humidifier in your home or bedroom. This may help loosen your mucus.  · If you were prescribed an antibiotic medicine, finish all of it even if you start to feel better.  · If you were prescribed an expectorant, take it as directed by your health care provider. This medicine loosens the mucus so you can cough it up.  · Take medicines only as  directed by your health care provider.  · Do not smoke. If you are a smoker and continue to smoke, your cough may last several weeks after your pneumonia has cleared.  · Get rest when you feel tired, or as needed.  PREVENTION  A pneumococcal shot (vaccine) is available to prevent a common bacterial cause of pneumonia. This is usually suggested for:  · People over 65 years old.  · People on chemotherapy.  · People with chronic lung problems, such as bronchitis or emphysema.  · People with immune system problems.  If you are over 65 years old or have a high risk condition, you may receive the pneumococcal vaccine if you have not received it before. In some countries, a routine influenza vaccine is also recommended. This vaccine can help prevent some cases of pneumonia. You may be offered the influenza vaccine as part of your care.  If you are a smoker, it is time to quit in order to prevent pneumonia in the future. You may receive instructions on how to stop smoking. Your health care provider can provide medicines and counseling to help you quit.  SEEK MEDICAL CARE IF:  · You have a fever.  · You cannot control your cough with suppressants at night, and you keep losing sleep.  SEEK IMMEDIATE MEDICAL CARE IF:   · You have worsening shortness of breath.  · You have increased chest pain.  · Your sickness becomes worse, especially if you are an older adult or have a weakened immune system.  · You cough up blood.  · You have pain that is getting worse or is not controlled with medicines.  · Your symptoms are getting worse rather than better.     This information is not intended to replace advice given to you by your health care provider. Make sure you discuss any questions you have with your health care provider.     Document Released: 12/18/2006 Document Revised: 01/08/2016 Document Reviewed: 04/13/2016  [employer] Interactive Patient Education ©2016 [employer]

## 2017-08-01 NOTE — MR AVS SNAPSHOT
"        Cheli Parks   2017 11:00 AM   Office Visit   MRN: [id number]    Department:  [facility]   Dept Phone:  [phone]    Description:  Female : 1940   Provider:  Nick Longo PA-C           Reason for Visit     Shortness of Breath x 2 days/ cough/ 89% O2 in lobby      Allergies as of 2017     Allergen Noted Reactions    Nkda [No Known Drug Allergy] 2009         You were diagnosed with     Pneumonia of right lower lobe due to infectious organism   [[id number]]         Vital Signs     Blood Pressure Pulse Temperature Respirations Height Weight    138/80 mmHg 95 37.4 °C (99.3 °F) 20 1.626 m (5' 4.02\") 62.596 kg (138 lb)    Body Mass Index Oxygen Saturation Smoking Status             23.68 kg/m2 94% Current Every Day Smoker         Basic Information     Date Of Birth Sex Race Ethnicity Preferred Language    1940 Female White Non- English      Your appointments     Aug 04, 2017 11:00 AM   Established Patient with Danny Buchanan M.D.   [facility] [address]    [address]  [address]  [place] [id number] [phone]           You will be receiving a confirmation call a few days before your appointment from our automated call confirmation system.            2017 11:20 AM   Established Patient with Danny Buchanan M.D.   [facility] [address]    [address]  [address]  [place] [id number] [phone]           You will be receiving a confirmation call a few days before your appointment from our automated call confirmation system.              Problem List              ICD-10-CM Priority Class Noted - Resolved    CATARACT    Unknown - Present    Tobacco abuse Z72.0   Unknown - Present    Dyslipidemia E78.5   Unknown - Present    Osteopenia M85.80   2014 - Present    IGT (impaired glucose tolerance) R73.02   9/3/2015 - Present    Vitamin D deficiency E55.9   2015 - Present    Chronic obstructive " pulmonary disease (CMS-[facility]) J44.9   5/1/2017 - Present    Lung mass R91.8   6/22/2017 - Present    Solitary pulmonary nodule R91.1   7/24/2017 - Present      Health Maintenance        Date Due Completion Dates    IMM DTaP/Tdap/Td Vaccine (1 - Tdap) 11/19/1958 ---    IMM ZOSTER VACCINE 11/19/1999 ---    IMM PNEUMOCOCCAL 65+ (ADULT) LOW/MEDIUM RISK SERIES (1 of 2 - PCV13) 11/19/2004 ---    IMM INFLUENZA (1) 9/1/2017 ---    MAMMOGRAM 1/18/2018 1/18/2017, 12/19/2016, 9/23/2015, 3/11/2013, 1/14/2011, 10/19/2009, 10/19/2009    BONE DENSITY 12/19/2021 12/19/2016, 12/12/2013, 8/9/2011    COLONOSCOPY 2/26/2023 2/26/2013 (Declined)    Override on 2/26/2013: Patient Declined            Current Immunizations     No immunizations on file.      Below and/or attached are the medications your provider expects you to take. Review all of your home medications and newly ordered medications with your provider and/or pharmacist. Follow medication instructions as directed by your provider and/or pharmacist. Please keep your medication list with you and share with your provider. Update the information when medications are discontinued, doses are changed, or new medications (including over-the-counter products) are added; and carry medication information at all times in the event of emergency situations     Allergies:  NKDA - (reactions not documented)               Medications  Valid as of: August 01, 2017 -  1:26 PM    Generic Name Brand Name Tablet Size Instructions for use    Cholecalciferol (Tab) cholecalciferol 1000 UNIT Take 1,000 Units by mouth every day.        LevoFLOXacin (Tab) LEVAQUIN 750 MG Take 1 Tab by mouth every day for 5 days.        Oyster Shell (Tab) OS-MARCO A 500 500 MG Take 500 mg by mouth 2 times a day, with meals.        Simvastatin (Tab) ZOCOR 40 MG TAKE 1 TABLET BY MOUTH ONCE DAILY        .                 Medicines prescribed today were sent to:     [facility] [address] [place] [address], [place] - [address]  [place] [address]    Phone: [phone] Fax: [phone]    Open 24 Hours?: No      Medication refill instructions:       If your prescription bottle indicates you have medication refills left, it is not necessary to call your provider’s office. Please contact your pharmacy and they will refill your medication.    If your prescription bottle indicates you do not have any refills left, you may request refills at any time through one of the following ways: The online [employer] system (except Urgent Care), by calling your provider’s office, or by asking your pharmacy to contact your provider’s office with a refill request. Medication refills are processed only during regular business hours and may not be available until the next business day. Your provider may request additional information or to have a follow-up visit with you prior to refilling your medication.   *Please Note: Medication refills are assigned a new Rx number when refilled electronically. Your pharmacy may indicate that no refills were authorized even though a new prescription for the same medication is available at the pharmacy. Please request the medicine by name with the pharmacy before contacting your provider for a refill.        Your To Do List     Future Labs/Procedures Complete By Expires    DX-CHEST-2 VIEWS  As directed 8/1/2018      Instructions    Pneumonia, Adult  Pneumonia is an infection of the lungs.   CAUSES  Pneumonia may be caused by bacteria or a virus. Usually, the infection is caused by breathing in droplets from an infected person's cough or sneeze.   SYMPTOMS   Symptoms of pneumonia include:  · Cough.  · Fever.  · Chest pain.  · Rapid breathing.  · Shortness of breath.  · Shaking chills.  · Mucus production.  DIAGNOSIS   If you have the common symptoms of pneumonia, often your health care provider will confirm the diagnosis with a chest X-ray. The X-ray will show an abnormality in the lung if you have pneumonia. Other tests may be  done on your blood, urine, or mucus (sputum) to find the specific cause of your pneumonia. A blood gas test or pulse oximetry test may be needed to check how well your lungs are working.  TREATMENT   Your treatment will depend on whether your pneumonia is caused by bacteria or a virus.   · Bacterial pneumonia is treated with antibiotic medicine.  · Pneumonia that is caused by the influenza virus may be treated with an antiviral medicine.  · Pneumonia that is caused by a virus other than influenza will not respond to antibiotic medicine. This type of pneumonia will have to run its course.   HOME CARE INSTRUCTIONS   · Cough suppressants may be used if you are losing too much rest from coughing at night. However, you should try to avoid taking cough suppresants. This is because coughing helps to remove mucus from your lungs.  · Sleep in a semi-upright position at night. Try sleeping in a reclining chair, or place a few pillows under your head.  · Try using a cold steam vaporizer or humidifier in your home or bedroom. This may help loosen your mucus.  · If you were prescribed an antibiotic medicine, finish all of it even if you start to feel better.  · If you were prescribed an expectorant, take it as directed by your health care provider. This medicine loosens the mucus so you can cough it up.  · Take medicines only as directed by your health care provider.  · Do not smoke. If you are a smoker and continue to smoke, your cough may last several weeks after your pneumonia has cleared.  · Get rest when you feel tired, or as needed.  PREVENTION  A pneumococcal shot (vaccine) is available to prevent a common bacterial cause of pneumonia. This is usually suggested for:  · People over 65 years old.  · People on chemotherapy.  · People with chronic lung problems, such as bronchitis or emphysema.  · People with immune system problems.  If you are over 65 years old or have a high risk condition, you may receive the pneumococcal  vaccine if you have not received it before. In some countries, a routine influenza vaccine is also recommended. This vaccine can help prevent some cases of pneumonia. You may be offered the influenza vaccine as part of your care.  If you are a smoker, it is time to quit in order to prevent pneumonia in the future. You may receive instructions on how to stop smoking. Your health care provider can provide medicines and counseling to help you quit.  SEEK MEDICAL CARE IF:  · You have a fever.  · You cannot control your cough with suppressants at night, and you keep losing sleep.  SEEK IMMEDIATE MEDICAL CARE IF:   · You have worsening shortness of breath.  · You have increased chest pain.  · Your sickness becomes worse, especially if you are an older adult or have a weakened immune system.  · You cough up blood.  · You have pain that is getting worse or is not controlled with medicines.  · Your symptoms are getting worse rather than better.     This information is not intended to replace advice given to you by your health care provider. Make sure you discuss any questions you have with your health care provider.     Document Released: 12/18/2006 Document Revised: 01/08/2016 Document Reviewed: 04/13/2016  [employer] Interactive Patient Education ©2016 [employer]            MyChart Status: Patient Declined        Quit Tobacco Information     Do you want to quit using tobacco?    Quitting tobacco decreases risks of cancer, heart and lung disease, increases life expectancy, improves sense of taste and smell, and increases spending money, among other benefits.    If you are thinking about quitting, we can help.  • [facility] Quit Tobacco Program: 955-606-9979  o Program occurs weekly for four weeks and includes pharmacist consultation on products to support quitting smoking or chewing tobacco. A provider referral is needed for pharmacist consultation.  • Tobacco Users Help Hotline: 4-980-QUIT-NOW (090-5318) or  https://[place].quitlogix.org/  o Free, confidential telephone and online coaching for [place] residents. Sessions are designed on a schedule that is convenient for you. Eligible clients receive free nicotine replacement therapy.  • Nationally: www.smokefree.gov  o Information and professional assistance to support both immediate and long-term needs as you become, and remain, a non-smoker. Smokefree.gov allows you to choose the help that best fits your needs.

## 2017-08-01 NOTE — PROGRESS NOTES
Subjective:      Cheli Parks is a 76 y.o. female who presents with Shortness of Breath            Shortness of Breath  This is a new problem. The current episode started yesterday. The problem occurs constantly. The problem has been unchanged. Associated symptoms include wheezing. Pertinent negatives include no chest pain, ear pain, fever, hemoptysis, sore throat or sputum production. Risk factors include smoking. She has tried OTC cough suppressants for the symptoms. The treatment provided no relief. Her past medical history is significant for chronic lung disease and pneumonia. (Malignant adenocarcinoma of right lower lung)       Review of Systems   Constitutional: Negative for fever, chills and malaise/fatigue.   HENT: Negative for congestion, ear pain and sore throat.    Respiratory: Positive for cough, shortness of breath and wheezing. Negative for hemoptysis and sputum production.    Cardiovascular: Negative for chest pain and palpitations.     All other systems reviewed and are negative.  PMH:  has a past medical history of Tobacco abuse; Hyperlipidemia; CATARACT; MEDICAL HOME (11/9/2012); Arthritis; Bilateral breast cysts; Emphysema of lung (CMS-[facility]); and Pneumonia (4-2017).  MEDS:   Current outpatient prescriptions:   •  calcium carbonate (CALCIUM CARBONATE) 500 MG Tab, Take 500 mg by mouth 2 times a day, with meals., Disp: , Rfl:   •  simvastatin (ZOCOR) 40 MG Tab, TAKE 1 TABLET BY MOUTH ONCE DAILY, Disp: 90 Tab, Rfl: 3  •  vitamin D (CHOLECALCIFEROL) 1000 UNIT Tab, Take 1,000 Units by mouth every day., Disp: , Rfl:   ALLERGIES:   Allergies   Allergen Reactions   • Nkda [No Known Drug Allergy]      SURGHX:   Past Surgical History   Procedure Laterality Date   • Abdominal hysterectomy total     • Cataract extraction with iol Bilateral    • Other       rita cataracts     SOCHX:  reports that she has been smoking Cigarettes.  She has a 60 pack-year smoking history. She uses smokeless tobacco. She  "reports that she drinks alcohol. She reports that she does not use illicit drugs.  FH: Family history was reviewed, no pertinent findings to report  Medications, Allergies, and current problem list reviewed today in Epic       Objective:     /80 mmHg  Pulse 95  Temp(Src) 37.4 °C (99.3 °F)  Resp 20  Ht 1.626 m (5' 4.02\")  Wt 62.596 kg (138 lb)  BMI 23.68 kg/m2  SpO2 94%     Physical Exam   Constitutional: She is oriented to person, place, and time. She appears well-developed and well-nourished. She is active.  Non-toxic appearance. She does not have a sickly appearance. She does not appear ill. No distress. She is not intubated.   HENT:   Head: Normocephalic and atraumatic.   Right Ear: Hearing, tympanic membrane, external ear and ear canal normal.   Left Ear: Hearing, tympanic membrane, external ear and ear canal normal.   Nose: Nose normal.   Mouth/Throat: Uvula is midline, oropharynx is clear and moist and mucous membranes are normal.   Eyes: Conjunctivae, EOM and lids are normal.   Neck: Normal range of motion. Neck supple.   Cardiovascular: Normal rate, regular rhythm, S1 normal, S2 normal and normal heart sounds.  Exam reveals no gallop and no friction rub.    No murmur heard.  Pulmonary/Chest: Effort normal. No accessory muscle usage. No apnea, no tachypnea and no bradypnea. She is not intubated. No respiratory distress. She has decreased breath sounds in the right lower field. She has no wheezes. She has no rhonchi. She has no rales. She exhibits no tenderness.   Musculoskeletal: Normal range of motion.   Neurological: She is alert and oriented to person, place, and time.   Skin: Skin is warm and dry.   Psychiatric: She has a normal mood and affect. Her speech is normal and behavior is normal. Judgment and thought content normal.   Vitals reviewed.           8/1/2017 12:21 PM    HISTORY/REASON FOR EXAM:  Shortness of Breath  Cough    TECHNIQUE/EXAM DESCRIPTION AND NUMBER OF VIEWS:  Two views of " the chest.    COMPARISON:  7/24/2017    FINDINGS:  Ill-defined opacity in the RIGHT lung base, posterior on lateral view.  No pleural fluid collection or pneumothorax.  Cardiomediastinal contour is within normal limits.  S-shaped curvature of thoracic spine.         Impression        1.  Ill-defined increased opacity in the posterior basal RIGHT lower lobe, most suggestive of pneumonia and possibly postobstructive.  Hemorrhage from prior biopsy in this region is also a consideration, although felt much less likely.  2.  No significant pleural fluid collection or pneumothorax.          Assessment/Plan:   Patient is a 76-year-old female who presents with shortness of breath for 2 days. She is a 60 year pack smoker and was recently diagnosed with malignant adenocarcinoma of the right lower lung. She had a biopsy done on 7/24/2017 which confirmed this. She is currently being scheduled for surgical consult. For the past 2 days she is complaining of shortness of breath and pain with inspiration on the right side. She denies fevers. Vital signs are normal. No crackles are heard in the lung fields. There is diminished breath sounds in the right lower lobe were a biopsy was taken. X-ray reveals most likely pneumonia.  We discussed differential diagnosis including PE, which she is at moderate risk.  Patient declines CT at this time and would like to trial antibiotic therapy.  She will follow up with her healthcare team ASAP for follow up.    1. Pneumonia of right lower lobe due to infectious organism  DX-CHEST-2 VIEWS    levofloxacin (LEVAQUIN) 750 MG tablet         Differential diagnosis, natural history, supportive care, and indications for immediate follow-up discussed at length.   Follow-up with primary care provider within 4-5 days, emergency room precautions discussed.  Patient and/or family appears understanding of information.

## 2017-08-03 ENCOUNTER — TELEPHONE (OUTPATIENT)
Dept: HEMATOLOGY ONCOLOGY | Facility: MEDICAL CENTER | Age: 77
End: 2017-08-03

## 2017-08-03 NOTE — TELEPHONE ENCOUNTER
Contacted patient to discuss my conversation at the lung tumor Board today. I did discuss her case at tumor board and consensus is that we should proceed with a FNA biopsy of the right parotid location that shows activity on PET/CT. I spoke to patient today and she stated that a few days after the biopsy she had some shortness of breath and went to urgent care. She stated they did an x-ray and she has pneumonia and is currently on Levaquin. She stated she is on her third day of antibiotics and stated she is feeling a little bit better at this time. I did discuss with her the recommendation to proceed with a biopsy but she is very anxious about doing that. She is not feeling very well at this time. I did plan with the patient to check in with her next week to discuss proceeding with an FNA biopsy. She also stated that she did not feel well enough to see Dr. Ganser but I will discuss that further with her next week. I did discuss with patient that if she had any worsening of symptoms or did not have resolution of her symptoms she is to go back to the urgent care or to the ER should she develop significant shortness of breath. Patient did verbalize understanding.

## 2017-08-07 ENCOUNTER — TELEPHONE (OUTPATIENT)
Dept: HEMATOLOGY ONCOLOGY | Facility: MEDICAL CENTER | Age: 77
End: 2017-08-07

## 2017-08-07 NOTE — TELEPHONE ENCOUNTER
Patient was returning your call. She was hoping you would be able to call her first thing in the morning please.    Thanks

## 2017-08-08 ENCOUNTER — TELEPHONE (OUTPATIENT)
Dept: HEMATOLOGY ONCOLOGY | Facility: MEDICAL CENTER | Age: 77
End: 2017-08-08

## 2017-08-08 DIAGNOSIS — R94.8 ABNORMAL POSITRON EMISSION TOMOGRAPHY (PET) SCAN: ICD-10-CM

## 2017-08-08 DIAGNOSIS — C34.31 MALIGNANT NEOPLASM OF LOWER LOBE OF RIGHT LUNG (HCC): ICD-10-CM

## 2017-08-08 NOTE — TELEPHONE ENCOUNTER
I spoke to the patient this morning. She states she is feeling better than she was last week. She did complete antibiotics and was given to her from her urgent care provider. I spoke to the patient that the consensus from the tumor board pertaining to the abnormal finding on PET scan, uptake within a borderline enlarged right level 2 jugulodigastric lymph node with an SUV of 9.8, and recommendation is to proceed with the biopsy. Patient is very anxious regarding proceeding with a biopsy but she has agreed to do it. I will proceed ordering the biopsy and get her scheduled. Patient also canceled her appointment with Dr. Ganser due to not feeling well with pneumonia and she is in agreement to schedule another appointment. I will follow-up with the patient after the biopsy to discuss the results.      1. Abnormal positron emission tomography (PET) scan  US-NEEDLE BX-SOFT TISSUE NECK-CHEST   2. Malignant neoplasm of lower lobe of right lung (CMS-HCC)  US-NEEDLE BX-SOFT TISSUE NECK-CHEST

## 2017-08-10 ENCOUNTER — TELEPHONE (OUTPATIENT)
Dept: HEMATOLOGY ONCOLOGY | Facility: MEDICAL CENTER | Age: 77
End: 2017-08-10

## 2017-08-11 NOTE — TELEPHONE ENCOUNTER
I called left Message regarding patient Biopsy of Neck I got it Scheduled for     Thursday 08/17/17      Checking at 10:45 am @ [address] [facility] [address]     Then she needs any apt with Corinne LEA RN as a IOC Follow up

## 2017-08-14 ENCOUNTER — TELEPHONE (OUTPATIENT)
Dept: HEMATOLOGY ONCOLOGY | Facility: MEDICAL CENTER | Age: 77
End: 2017-08-14

## 2017-08-14 NOTE — TELEPHONE ENCOUNTER
2nd attempt     I called left Message regarding patient Biopsy of Neck I got it Scheduled for     Thursday 08/17/17        Checking at 10:45 am @ [address] [facility] [address]     Then she needs any apt with Corinne LEA RN as a IOC Follow up

## 2017-08-17 ENCOUNTER — HOSPITAL ENCOUNTER (OUTPATIENT)
Dept: RADIOLOGY | Facility: MEDICAL CENTER | Age: 77
End: 2017-08-17
Attending: NURSE PRACTITIONER
Payer: MEDICARE

## 2017-08-17 DIAGNOSIS — R94.8 ABNORMAL POSITRON EMISSION TOMOGRAPHY (PET) SCAN: ICD-10-CM

## 2017-08-17 DIAGNOSIS — C34.31 MALIGNANT NEOPLASM OF LOWER LOBE OF RIGHT LUNG (HCC): ICD-10-CM

## 2017-08-17 PROCEDURE — 10022 HCHG FINE NEEDLE ASP W/IMAGING GUIDANCE: CPT

## 2017-08-17 PROCEDURE — 88112 CYTOPATH CELL ENHANCE TECH: CPT

## 2017-08-17 PROCEDURE — 76942 ECHO GUIDE FOR BIOPSY: CPT

## 2017-08-17 ASSESSMENT — PAIN SCALES - GENERAL: PAINLEVEL_OUTOF10: 0

## 2017-08-17 NOTE — PROGRESS NOTES
US guided right jugulodiagastric lymph node fine needle aspiration done by Dr. Laughlin; right anterior aspect of neck access site; procedural RN: Sammi; 1 jar of cytolyt obtained and sent to pathology lab; pt tolerated the procedure well; pt hemodynamically stable pre/intra/post procedure; all questions and concerns answered prior to being d/c; patient provided with appropriate education for procedure; pt d/c home.     *Per Dr Laughlin parotid gland NOT biopsied.

## 2017-08-21 ENCOUNTER — TELEPHONE (OUTPATIENT)
Dept: HEMATOLOGY ONCOLOGY | Facility: MEDICAL CENTER | Age: 77
End: 2017-08-21

## 2017-08-22 NOTE — TELEPHONE ENCOUNTER
Patient completed a biopsy of the right jugulodigastric lymph node. The biopsy shows rare epithelial clusters in the background of small mature and reactive lymphocytes. There is no evidence of malignancy seen on this fine-needle aspiration. Patient did have uptake noted on the PET scan which is the reason for proceeding with the biopsy. I spoke to the patient today in did discuss with her that she is scheduled to see Dr. Ganser on Thursday of this week to discuss surgical intervention with regards to her new diagnosis of lung cancer. I will also return to Dr. Ganser to discuss the results of this lymph node biopsy and whether he may want to discuss removing the lymph node as well. I discussed with the patient let her know that I would talk to Dr. Ganser before her appointment on Thursday. She is excited to be Dr. Ganser and discussed the possibility of surgery at this time.      FINAL DIAGNOSIS:    A. Right jugulodigastric lymph node FNA:         Rare epithelial clusters in the background of small mature and          reactive lymphocytes. (See comment)    Comment: This fine-needle aspiration targeted lesion deep to the right  parotid gland. This patient has a history of invasive adenocarcinoma of  the lung (needle core biopsies [id number],7/24/2017). The rare  epithelial clusters seen in the background of lymphocytes lack  definitive malignant cytologic features as compared to the lung biopsy.  While I cannot completely rule out a metastasis, the rare epithelial  cells show some granularity to their cytoplasm suggesting oncocytes,  there are no definite malignant cytologic feature and the site near the  parotid also favors the possibility of a Warthin's tumor.

## 2017-09-13 DIAGNOSIS — Z01.810 PRE-OPERATIVE CARDIOVASCULAR EXAMINATION: ICD-10-CM

## 2017-09-13 DIAGNOSIS — Z01.812 PRE-OPERATIVE LABORATORY EXAMINATION: ICD-10-CM

## 2017-09-13 LAB
ABO GROUP BLD: NORMAL
ALBUMIN SERPL BCP-MCNC: 4.3 G/DL (ref 3.2–4.9)
ALBUMIN/GLOB SERPL: 1.5 G/DL
ALP SERPL-CCNC: 65 U/L (ref 30–99)
ALT SERPL-CCNC: 11 U/L (ref 2–50)
ANION GAP SERPL CALC-SCNC: 8 MMOL/L (ref 0–11.9)
AST SERPL-CCNC: 16 U/L (ref 12–45)
BASOPHILS # BLD AUTO: 0.4 % (ref 0–1.8)
BASOPHILS # BLD: 0.03 K/UL (ref 0–0.12)
BILIRUB SERPL-MCNC: 0.8 MG/DL (ref 0.1–1.5)
BLD GP AB SCN SERPL QL: NORMAL
BUN SERPL-MCNC: 18 MG/DL (ref 8–22)
CALCIUM SERPL-MCNC: 9.9 MG/DL (ref 8.5–10.5)
CHLORIDE SERPL-SCNC: 107 MMOL/L (ref 96–112)
CO2 SERPL-SCNC: 25 MMOL/L (ref 20–33)
CREAT SERPL-MCNC: 0.71 MG/DL (ref 0.5–1.4)
EKG IMPRESSION: NORMAL
EOSINOPHIL # BLD AUTO: 0.15 K/UL (ref 0–0.51)
EOSINOPHIL NFR BLD: 2 % (ref 0–6.9)
ERYTHROCYTE [DISTWIDTH] IN BLOOD BY AUTOMATED COUNT: 50.8 FL (ref 35.9–50)
GFR SERPL CREATININE-BSD FRML MDRD: >60 ML/MIN/1.73 M 2
GLOBULIN SER CALC-MCNC: 2.9 G/DL (ref 1.9–3.5)
GLUCOSE SERPL-MCNC: 82 MG/DL (ref 65–99)
HCT VFR BLD AUTO: 42.6 % (ref 37–47)
HGB BLD-MCNC: 14.1 G/DL (ref 12–16)
IMM GRANULOCYTES # BLD AUTO: 0.03 K/UL (ref 0–0.11)
IMM GRANULOCYTES NFR BLD AUTO: 0.4 % (ref 0–0.9)
LYMPHOCYTES # BLD AUTO: 2.79 K/UL (ref 1–4.8)
LYMPHOCYTES NFR BLD: 37.4 % (ref 22–41)
MCH RBC QN AUTO: 31.8 PG (ref 27–33)
MCHC RBC AUTO-ENTMCNC: 33.1 G/DL (ref 33.6–35)
MCV RBC AUTO: 95.9 FL (ref 81.4–97.8)
MONOCYTES # BLD AUTO: 0.59 K/UL (ref 0–0.85)
MONOCYTES NFR BLD AUTO: 7.9 % (ref 0–13.4)
NEUTROPHILS # BLD AUTO: 3.87 K/UL (ref 2–7.15)
NEUTROPHILS NFR BLD: 51.9 % (ref 44–72)
NRBC # BLD AUTO: 0 K/UL
NRBC BLD AUTO-RTO: 0 /100 WBC
PLATELET # BLD AUTO: 228 K/UL (ref 164–446)
PMV BLD AUTO: 11.9 FL (ref 9–12.9)
POTASSIUM SERPL-SCNC: 3.5 MMOL/L (ref 3.6–5.5)
PROT SERPL-MCNC: 7.2 G/DL (ref 6–8.2)
RBC # BLD AUTO: 4.44 M/UL (ref 4.2–5.4)
RH BLD: NORMAL
SODIUM SERPL-SCNC: 140 MMOL/L (ref 135–145)
WBC # BLD AUTO: 7.5 K/UL (ref 4.8–10.8)

## 2017-09-13 PROCEDURE — 85025 COMPLETE CBC W/AUTO DIFF WBC: CPT

## 2017-09-13 PROCEDURE — 80053 COMPREHEN METABOLIC PANEL: CPT

## 2017-09-13 PROCEDURE — 86901 BLOOD TYPING SEROLOGIC RH(D): CPT

## 2017-09-13 PROCEDURE — 93010 ELECTROCARDIOGRAM REPORT: CPT | Performed by: INTERNAL MEDICINE

## 2017-09-13 PROCEDURE — 86900 BLOOD TYPING SEROLOGIC ABO: CPT

## 2017-09-13 PROCEDURE — 86850 RBC ANTIBODY SCREEN: CPT

## 2017-09-13 PROCEDURE — 36415 COLL VENOUS BLD VENIPUNCTURE: CPT

## 2017-09-13 PROCEDURE — 93005 ELECTROCARDIOGRAM TRACING: CPT

## 2017-09-18 ENCOUNTER — HOSPITAL ENCOUNTER (INPATIENT)
Facility: MEDICAL CENTER | Age: 77
LOS: 4 days | DRG: 164 | End: 2017-09-22
Attending: SURGERY | Admitting: SURGERY
Payer: MEDICARE

## 2017-09-18 ENCOUNTER — APPOINTMENT (OUTPATIENT)
Dept: RADIOLOGY | Facility: MEDICAL CENTER | Age: 77
DRG: 164 | End: 2017-09-18
Attending: PHYSICIAN ASSISTANT
Payer: MEDICARE

## 2017-09-18 DIAGNOSIS — J44.9 CHRONIC OBSTRUCTIVE PULMONARY DISEASE, UNSPECIFIED COPD TYPE (HCC): ICD-10-CM

## 2017-09-18 DIAGNOSIS — C34.31 MALIGNANT NEOPLASM OF LOWER LOBE OF RIGHT LUNG (HCC): ICD-10-CM

## 2017-09-18 PROBLEM — C34.30 MALIGNANT NEOPLASM OF LOWER LOBE OF LUNG (HCC): Status: ACTIVE | Noted: 2017-09-18

## 2017-09-18 LAB — ABO GROUP BLD: NORMAL

## 2017-09-18 PROCEDURE — 500378 HCHG DRAIN, J-VAC ROUND 19FR: Performed by: SURGERY

## 2017-09-18 PROCEDURE — 88313 SPECIAL STAINS GROUP 2: CPT

## 2017-09-18 PROCEDURE — 700105 HCHG RX REV CODE 258: Performed by: PHYSICIAN ASSISTANT

## 2017-09-18 PROCEDURE — 71010 DX-CHEST-PORTABLE (1 VIEW): CPT

## 2017-09-18 PROCEDURE — 160041 HCHG SURGERY MINUTES - EA ADDL 1 MIN LEVEL 4: Performed by: SURGERY

## 2017-09-18 PROCEDURE — 501463 HCHG STAPLES, UNIV. ROTIC: Performed by: SURGERY

## 2017-09-18 PROCEDURE — 502240 HCHG MISC OR SUPPLY RC 0272: Performed by: SURGERY

## 2017-09-18 PROCEDURE — 501570 HCHG TROCAR, SEPARATOR: Performed by: SURGERY

## 2017-09-18 PROCEDURE — 160048 HCHG OR STATISTICAL LEVEL 1-5: Performed by: SURGERY

## 2017-09-18 PROCEDURE — 88305 TISSUE EXAM BY PATHOLOGIST: CPT

## 2017-09-18 PROCEDURE — 501838 HCHG SUTURE GENERAL: Performed by: SURGERY

## 2017-09-18 PROCEDURE — 700101 HCHG RX REV CODE 250

## 2017-09-18 PROCEDURE — 160029 HCHG SURGERY MINUTES - 1ST 30 MINS LEVEL 4: Performed by: SURGERY

## 2017-09-18 PROCEDURE — 160003 HCHG RECOVERY MINUTES (EXTENDED) STAT: Performed by: SURGERY

## 2017-09-18 PROCEDURE — 160002 HCHG RECOVERY MINUTES (STAT): Performed by: SURGERY

## 2017-09-18 PROCEDURE — 160036 HCHG PACU - EA ADDL 30 MINS PHASE I: Performed by: SURGERY

## 2017-09-18 PROCEDURE — 700111 HCHG RX REV CODE 636 W/ 250 OVERRIDE (IP)

## 2017-09-18 PROCEDURE — A6402 STERILE GAUZE <= 16 SQ IN: HCPCS | Performed by: SURGERY

## 2017-09-18 PROCEDURE — 500385 HCHG DRAIN, PLEUROVAC ADUL: Performed by: SURGERY

## 2017-09-18 PROCEDURE — 88307 TISSUE EXAM BY PATHOLOGIST: CPT

## 2017-09-18 PROCEDURE — 88309 TISSUE EXAM BY PATHOLOGIST: CPT

## 2017-09-18 PROCEDURE — 500697 HCHG HEMOCLIP, LARGE (ORANGE): Performed by: SURGERY

## 2017-09-18 PROCEDURE — 502571 HCHG PACK, LAP CHOLE: Performed by: SURGERY

## 2017-09-18 PROCEDURE — 700111 HCHG RX REV CODE 636 W/ 250 OVERRIDE (IP): Performed by: PHYSICIAN ASSISTANT

## 2017-09-18 PROCEDURE — 160009 HCHG ANES TIME/MIN: Performed by: SURGERY

## 2017-09-18 PROCEDURE — 500312 HCHG CONNECTOR, BLAKE DRAIN 1-WAY: Performed by: SURGERY

## 2017-09-18 PROCEDURE — 0BTF4ZZ RESECTION OF RIGHT LOWER LUNG LOBE, PERCUTANEOUS ENDOSCOPIC APPROACH: ICD-10-PCS | Performed by: SURGERY

## 2017-09-18 PROCEDURE — 07B74ZZ EXCISION OF THORAX LYMPHATIC, PERCUTANEOUS ENDOSCOPIC APPROACH: ICD-10-PCS | Performed by: SURGERY

## 2017-09-18 PROCEDURE — 501581 HCHG TROCAR: Performed by: SURGERY

## 2017-09-18 PROCEDURE — 770006 HCHG ROOM/CARE - MED/SURG/GYN SEMI*

## 2017-09-18 PROCEDURE — 160035 HCHG PACU - 1ST 60 MINS PHASE I: Performed by: SURGERY

## 2017-09-18 PROCEDURE — 501574 HCHG TROCAR, SMTH CAN&SEAL 5: Performed by: SURGERY

## 2017-09-18 PROCEDURE — A6404 STERILE GAUZE > 48 SQ IN: HCPCS | Performed by: SURGERY

## 2017-09-18 RX ORDER — SODIUM CHLORIDE, SODIUM LACTATE, POTASSIUM CHLORIDE, CALCIUM CHLORIDE 600; 310; 30; 20 MG/100ML; MG/100ML; MG/100ML; MG/100ML
INJECTION, SOLUTION INTRAVENOUS CONTINUOUS
Status: DISCONTINUED | OUTPATIENT
Start: 2017-09-18 | End: 2017-09-18

## 2017-09-18 RX ORDER — ENALAPRILAT 1.25 MG/ML
2.5 INJECTION INTRAVENOUS EVERY 6 HOURS PRN
Status: DISCONTINUED | OUTPATIENT
Start: 2017-09-18 | End: 2017-09-22 | Stop reason: HOSPADM

## 2017-09-18 RX ORDER — FAMOTIDINE 20 MG/1
20 TABLET, FILM COATED ORAL 2 TIMES DAILY
Status: DISCONTINUED | OUTPATIENT
Start: 2017-09-18 | End: 2017-09-22 | Stop reason: HOSPADM

## 2017-09-18 RX ORDER — ONDANSETRON 2 MG/ML
4 INJECTION INTRAMUSCULAR; INTRAVENOUS EVERY 4 HOURS PRN
Status: DISCONTINUED | OUTPATIENT
Start: 2017-09-18 | End: 2017-09-22 | Stop reason: HOSPADM

## 2017-09-18 RX ORDER — MORPHINE SULFATE 4 MG/ML
2 INJECTION, SOLUTION INTRAMUSCULAR; INTRAVENOUS
Status: DISCONTINUED | OUTPATIENT
Start: 2017-09-18 | End: 2017-09-22 | Stop reason: HOSPADM

## 2017-09-18 RX ORDER — HALOPERIDOL 5 MG/ML
INJECTION INTRAMUSCULAR
Status: COMPLETED
Start: 2017-09-18 | End: 2017-09-18

## 2017-09-18 RX ORDER — LIDOCAINE HYDROCHLORIDE 10 MG/ML
0.5 INJECTION, SOLUTION INFILTRATION; PERINEURAL
Status: DISCONTINUED | OUTPATIENT
Start: 2017-09-18 | End: 2017-09-22 | Stop reason: HOSPADM

## 2017-09-18 RX ORDER — HYDROCODONE BITARTRATE AND ACETAMINOPHEN 5; 325 MG/1; MG/1
1-2 TABLET ORAL EVERY 4 HOURS PRN
Status: DISCONTINUED | OUTPATIENT
Start: 2017-09-18 | End: 2017-09-22 | Stop reason: HOSPADM

## 2017-09-18 RX ORDER — SIMVASTATIN 40 MG
40 TABLET ORAL EVERY EVENING
Status: DISCONTINUED | OUTPATIENT
Start: 2017-09-19 | End: 2017-09-22 | Stop reason: HOSPADM

## 2017-09-18 RX ORDER — SODIUM CHLORIDE, SODIUM LACTATE, POTASSIUM CHLORIDE, CALCIUM CHLORIDE 600; 310; 30; 20 MG/100ML; MG/100ML; MG/100ML; MG/100ML
1000 INJECTION, SOLUTION INTRAVENOUS CONTINUOUS
Status: DISCONTINUED | OUTPATIENT
Start: 2017-09-18 | End: 2017-09-22 | Stop reason: HOSPADM

## 2017-09-18 RX ORDER — PROMETHAZINE HYDROCHLORIDE 25 MG/1
25 SUPPOSITORY RECTAL EVERY 6 HOURS PRN
Status: DISCONTINUED | OUTPATIENT
Start: 2017-09-18 | End: 2017-09-22 | Stop reason: HOSPADM

## 2017-09-18 RX ORDER — ONDANSETRON 4 MG/1
4 TABLET, ORALLY DISINTEGRATING ORAL EVERY 4 HOURS PRN
Status: DISCONTINUED | OUTPATIENT
Start: 2017-09-18 | End: 2017-09-22 | Stop reason: HOSPADM

## 2017-09-18 RX ORDER — HYDRALAZINE HYDROCHLORIDE 20 MG/ML
10 INJECTION INTRAMUSCULAR; INTRAVENOUS EVERY 6 HOURS PRN
Status: DISCONTINUED | OUTPATIENT
Start: 2017-09-18 | End: 2017-09-22 | Stop reason: HOSPADM

## 2017-09-18 RX ORDER — LIDOCAINE AND PRILOCAINE 25; 25 MG/G; MG/G
1 CREAM TOPICAL
Status: DISCONTINUED | OUTPATIENT
Start: 2017-09-18 | End: 2017-09-22 | Stop reason: HOSPADM

## 2017-09-18 RX ORDER — BUPIVACAINE HYDROCHLORIDE AND EPINEPHRINE 5; 5 MG/ML; UG/ML
INJECTION, SOLUTION EPIDURAL; INTRACAUDAL; PERINEURAL
Status: DISCONTINUED | OUTPATIENT
Start: 2017-09-18 | End: 2017-09-18 | Stop reason: HOSPADM

## 2017-09-18 RX ORDER — LORAZEPAM 2 MG/ML
.5-1 INJECTION INTRAMUSCULAR EVERY 4 HOURS PRN
Status: DISCONTINUED | OUTPATIENT
Start: 2017-09-18 | End: 2017-09-22 | Stop reason: HOSPADM

## 2017-09-18 RX ADMIN — FENTANYL CITRATE 50 MCG: 50 INJECTION, SOLUTION INTRAMUSCULAR; INTRAVENOUS at 17:48

## 2017-09-18 RX ADMIN — SODIUM CHLORIDE, POTASSIUM CHLORIDE, SODIUM LACTATE AND CALCIUM CHLORIDE 1000 ML: 600; 310; 30; 20 INJECTION, SOLUTION INTRAVENOUS at 22:29

## 2017-09-18 RX ADMIN — FENTANYL CITRATE 50 MCG: 50 INJECTION, SOLUTION INTRAMUSCULAR; INTRAVENOUS at 17:43

## 2017-09-18 RX ADMIN — HYDROMORPHONE HYDROCHLORIDE 0.5 MG: 1 INJECTION, SOLUTION INTRAMUSCULAR; INTRAVENOUS; SUBCUTANEOUS at 18:05

## 2017-09-18 RX ADMIN — HYDROMORPHONE HYDROCHLORIDE 0.5 MG: 1 INJECTION, SOLUTION INTRAMUSCULAR; INTRAVENOUS; SUBCUTANEOUS at 18:25

## 2017-09-18 RX ADMIN — SODIUM CHLORIDE, SODIUM LACTATE, POTASSIUM CHLORIDE, CALCIUM CHLORIDE: 600; 310; 30; 20 INJECTION, SOLUTION INTRAVENOUS at 11:55

## 2017-09-18 RX ADMIN — HALOPERIDOL LACTATE 1 MG: 5 INJECTION, SOLUTION INTRAMUSCULAR at 21:00

## 2017-09-18 RX ADMIN — HYDROMORPHONE HYDROCHLORIDE 0.5 MG: 1 INJECTION, SOLUTION INTRAMUSCULAR; INTRAVENOUS; SUBCUTANEOUS at 18:01

## 2017-09-18 RX ADMIN — HALOPERIDOL LACTATE 1 MG: 5 INJECTION, SOLUTION INTRAMUSCULAR at 20:53

## 2017-09-18 RX ADMIN — HYDROMORPHONE HYDROCHLORIDE 0.5 MG: 1 INJECTION, SOLUTION INTRAMUSCULAR; INTRAVENOUS; SUBCUTANEOUS at 17:55

## 2017-09-18 RX ADMIN — FAMOTIDINE 20 MG: 10 INJECTION, SOLUTION INTRAVENOUS at 22:29

## 2017-09-18 RX ADMIN — ONDANSETRON 4 MG: 2 INJECTION INTRAMUSCULAR; INTRAVENOUS at 22:29

## 2017-09-18 RX ADMIN — ALBUTEROL SULFATE 2.5 MG: 2.5 SOLUTION RESPIRATORY (INHALATION) at 17:49

## 2017-09-18 ASSESSMENT — PAIN SCALES - GENERAL
PAINLEVEL_OUTOF10: 0
PAINLEVEL_OUTOF10: 7
PAINLEVEL_OUTOF10: 0
PAINLEVEL_OUTOF10: 5
PAINLEVEL_OUTOF10: 0
PAINLEVEL_OUTOF10: 6
PAINLEVEL_OUTOF10: 0
PAINLEVEL_OUTOF10: 0
PAINLEVEL_OUTOF10: 10
PAINLEVEL_OUTOF10: 0
PAINLEVEL_OUTOF10: 5
PAINLEVEL_OUTOF10: 0
PAINLEVEL_OUTOF10: 0
PAINLEVEL_OUTOF10: 6

## 2017-09-18 ASSESSMENT — LIFESTYLE VARIABLES
ALCOHOL_USE: NO
EVER_SMOKED: YES

## 2017-09-19 ENCOUNTER — APPOINTMENT (OUTPATIENT)
Dept: RADIOLOGY | Facility: MEDICAL CENTER | Age: 77
DRG: 164 | End: 2017-09-19
Attending: PHYSICIAN ASSISTANT
Payer: MEDICARE

## 2017-09-19 LAB
ANION GAP SERPL CALC-SCNC: 11 MMOL/L (ref 0–11.9)
BUN SERPL-MCNC: 13 MG/DL (ref 8–22)
CALCIUM SERPL-MCNC: 8.6 MG/DL (ref 8.5–10.5)
CHLORIDE SERPL-SCNC: 105 MMOL/L (ref 96–112)
CO2 SERPL-SCNC: 21 MMOL/L (ref 20–33)
CREAT SERPL-MCNC: 0.63 MG/DL (ref 0.5–1.4)
ERYTHROCYTE [DISTWIDTH] IN BLOOD BY AUTOMATED COUNT: 49.7 FL (ref 35.9–50)
GFR SERPL CREATININE-BSD FRML MDRD: >60 ML/MIN/1.73 M 2
GLUCOSE SERPL-MCNC: 132 MG/DL (ref 65–99)
HCT VFR BLD AUTO: 39.8 % (ref 37–47)
HGB BLD-MCNC: 13.2 G/DL (ref 12–16)
MCH RBC QN AUTO: 32 PG (ref 27–33)
MCHC RBC AUTO-ENTMCNC: 33.2 G/DL (ref 33.6–35)
MCV RBC AUTO: 96.4 FL (ref 81.4–97.8)
PLATELET # BLD AUTO: 210 K/UL (ref 164–446)
PMV BLD AUTO: 10.3 FL (ref 9–12.9)
POTASSIUM SERPL-SCNC: 3.8 MMOL/L (ref 3.6–5.5)
RBC # BLD AUTO: 4.13 M/UL (ref 4.2–5.4)
SODIUM SERPL-SCNC: 137 MMOL/L (ref 135–145)
WBC # BLD AUTO: 13.4 K/UL (ref 4.8–10.8)

## 2017-09-19 PROCEDURE — 700105 HCHG RX REV CODE 258: Performed by: PHYSICIAN ASSISTANT

## 2017-09-19 PROCEDURE — 85027 COMPLETE CBC AUTOMATED: CPT

## 2017-09-19 PROCEDURE — 700111 HCHG RX REV CODE 636 W/ 250 OVERRIDE (IP): Performed by: PHYSICIAN ASSISTANT

## 2017-09-19 PROCEDURE — 36415 COLL VENOUS BLD VENIPUNCTURE: CPT

## 2017-09-19 PROCEDURE — 71010 DX-CHEST-PORTABLE (1 VIEW): CPT

## 2017-09-19 PROCEDURE — 770006 HCHG ROOM/CARE - MED/SURG/GYN SEMI*

## 2017-09-19 PROCEDURE — 700102 HCHG RX REV CODE 250 W/ 637 OVERRIDE(OP): Performed by: PHYSICIAN ASSISTANT

## 2017-09-19 PROCEDURE — 80048 BASIC METABOLIC PNL TOTAL CA: CPT

## 2017-09-19 PROCEDURE — A9270 NON-COVERED ITEM OR SERVICE: HCPCS | Performed by: PHYSICIAN ASSISTANT

## 2017-09-19 RX ADMIN — HYDROCODONE BITARTRATE AND ACETAMINOPHEN 2 TABLET: 5; 325 TABLET ORAL at 22:38

## 2017-09-19 RX ADMIN — SIMVASTATIN 40 MG: 40 TABLET, FILM COATED ORAL at 22:38

## 2017-09-19 RX ADMIN — ENOXAPARIN SODIUM 40 MG: 100 INJECTION SUBCUTANEOUS at 07:59

## 2017-09-19 RX ADMIN — HYDROCODONE BITARTRATE AND ACETAMINOPHEN 2 TABLET: 5; 325 TABLET ORAL at 12:08

## 2017-09-19 RX ADMIN — HYDROCODONE BITARTRATE AND ACETAMINOPHEN 2 TABLET: 5; 325 TABLET ORAL at 03:48

## 2017-09-19 RX ADMIN — HYDROCODONE BITARTRATE AND ACETAMINOPHEN 2 TABLET: 5; 325 TABLET ORAL at 17:53

## 2017-09-19 RX ADMIN — SODIUM CHLORIDE, POTASSIUM CHLORIDE, SODIUM LACTATE AND CALCIUM CHLORIDE 1000 ML: 600; 310; 30; 20 INJECTION, SOLUTION INTRAVENOUS at 12:17

## 2017-09-19 RX ADMIN — FAMOTIDINE 20 MG: 20 TABLET, FILM COATED ORAL at 22:38

## 2017-09-19 RX ADMIN — CEFAZOLIN SODIUM 1 G: 1 INJECTION, SOLUTION INTRAVENOUS at 05:56

## 2017-09-19 RX ADMIN — FAMOTIDINE 20 MG: 20 TABLET, FILM COATED ORAL at 07:59

## 2017-09-19 RX ADMIN — HYDROCODONE BITARTRATE AND ACETAMINOPHEN 2 TABLET: 5; 325 TABLET ORAL at 07:59

## 2017-09-19 RX ADMIN — CEFAZOLIN SODIUM 1 G: 1 INJECTION, SOLUTION INTRAVENOUS at 00:13

## 2017-09-19 ASSESSMENT — PAIN SCALES - GENERAL
PAINLEVEL_OUTOF10: 6
PAINLEVEL_OUTOF10: 5
PAINLEVEL_OUTOF10: 4
PAINLEVEL_OUTOF10: 8
PAINLEVEL_OUTOF10: 7
PAINLEVEL_OUTOF10: 5

## 2017-09-19 NOTE — OP REPORT
DATE OF SERVICE:  09/18/2017    PREOPERATIVE DIAGNOSIS:  Right lower lobe lung cancer.    POSTOPERATIVE DIAGNOSIS:  Right lower lobe lung cancer.    PROCEDURE PERFORMED:  Thoracoscopic right lower lobectomy, lymph node   dissection.    SURGEON:  John Ganser, MD    ASSISTANT:  Alcides Whalen PA-C, and Malcolm Grullon MD.    ANESTHESIA:  General.    ANESTHESIOLOGIST:  Santos George MD.    INDICATIONS:  Patient is a 76-year-old female who had a chest x-ray for cough   several months ago showing possible pneumonia.  CT scan was ultimately done   showing a 2.2x3.8 cm spiculated mass in the right lower lobe and PET scan   showed uptake.  She had uptake in a neck node, but needle aspiration was negative.    Biopsy of the lung mass confirms invasive adenocarcinoma.  Lung function is   adequate.  We discussed at length risks, benefits, and alternatives to   thoracoscopic right lower lobectomy and node dissection and she wished to   proceed.    FINDINGS:  There were some inflammatory adhesions laterally, but no adhesions   overlying this puckered mass.  There was no pleural effusion or nodularity.    DESCRIPTION OF PROCEDURE:  The patient was identified and general anesthetic   administered with a double-lumen endotracheal tube.  She was placed in left   lateral decubitus position and her right chest was prepped and draped in the   usual sterile fashion.  Local anesthesia of 0.5% Marcaine with epinephrine was   injected prior to making skin incision.  Small incision was made, midaxillary   line, approximately the 8th intercostal space, and a 5 mm blunt trocar and 5   mm 30-degree scope inserted.  Anterior, posterior, mid thoracic 5 mm trocars   were placed and a 12 mm trocar placed anteriorly just above the diaphragm in   the 8th intercostal space.  The above findings were noted.  Dissection was   begun by dividing the inferior pulmonary ligament using cautery and a node   there was sent as station 9 lymph node.  The hilar  pleura was divided   posteriorly.  There were some enlarged subcarinal nodes that were dissected   out and sent as station 7 lymph nodes.  The anterior hilar pleura was then   divided.  The fissure was opened and the artery identified there.  Nodes in   the hilum around the artery were dissected out and sent as station 10 lymph   nodes.  Circumferential dissection was able to be carried around the lower   lobe artery and this was divided with the [place] 45 powered stapler using   gray load with good control.  The superior segmental branch was then able to   be circumferentially dissected and divided with the vascular stapler as well.    The inferior pulmonary vein was divided with the stapler using a white load.    This allowed elevation of the lobe and all paresh tissue around the bronchus   were dissected and left on the specimen side.  The bronchus was divided with   the stapler using a gold load and the posterior aspect of the fissure   completed with sequential firing of stapler using blue loads.  The specimen   was placed in endoscopic bag, withdrawn through the 12-mm trocar site, which   had to be enlarged to about 4 or 5 cm.    The lung was reexpanded.  There was no evidence of air leak.  A 19-Tamazight   Marcus drain was passed through the midaxillary line incision, angled towards   the apex and posteriorly and secured to skin with silk.  The small accessory   incision was closed at the muscle level with running 2-0 Vicryl and skin   incisions with 4-0 Vicryl subcuticular sutures.  Sterile dressings were   applied and the tube attached to a pleural suction device.  The patient   returned to recovery room in stable condition.    Sponge and needle counts correct x2 at the end of the procedure.  Estimated   blood loss approximately 35 mL       ____________________________________     JOHN H. GANSER, MD [facility] / [facility]    DD:  09/18/2017 17:29:15  DT:  09/18/2017 18:50:03    D#:  [id number]  Job#:  [id number]    cc:  Corinne LEAN, AMY ELY MD

## 2017-09-19 NOTE — RESPIRATORY CARE
COPD EDUCATION by COPD CLINICAL EDUCATOR  9/19/2017 at 7:37 AM by Billie Price     Patient reviewed by COPD education team. Patient does not qualify for COPD program.

## 2017-09-19 NOTE — PROGRESS NOTES
Received report from evening RN.  Assumed care of patient.  Patient A&Ox4.  C/O pain 5/10, medicated per MAR.  No complaints of nausea or vomiting.  No numbness/tingling.  +void.  Right chest tube, to suction, in place with serosanguinous output.  Right subcutaneous emphysema in right shoulder blade area.  Air leak present, MD aware. On 2L oxygen by nasal canula.  IS at bedside, educated on its use. Up with SBA. Plan of care discussed.  Call light within reach.  Bed in lowest position.

## 2017-09-19 NOTE — DISCHARGE PLANNING
Care Transition Team Assessment    IHD met with patient bedside. She stated she lives alone, but her son checks on her regularly. She does not have any O2, DME or HHC at home. She would like to go home with a walker and HHC to help her shower for at least the first few days.     Information Source  Orientation : Oriented x 4  Information Given By: Patient  Informant's Name: Cheli  Who is responsible for making decisions for patient? : Patient    Readmission Evaluation  Is this a readmission?: No    Elopement Risk  Legal Hold: No  Ambulatory or Self Mobile in Wheelchair: Yes  Disoriented: No  Psychiatric Symptoms: None  History of Wandering: No  Elopement this Admit: No  Vocalizing Wanting to Leave: No  Displays Behaviors, Body Language Wanting to Leave: No-Not at Risk for Elopement  Elopement Risk: Not at Risk for Elopement    Interdisciplinary Discharge Planning  Primary Care Physician: Danny Buchanan  Lives with - Patient's Self Care Capacity: Alone and Able to Care For Self  Support Systems: Family Member(s)  Housing / Facility: 3 Story Apartment / Condo  Do You Take your Prescribed Medications Regularly: Yes  Able to Return to Previous ADL's: Yes  Mobility Issues: No  Prior Services: None  Durable Medical Equipment: Not Applicable    Discharge Preparedness  What is your plan after discharge?: Home health care  What are your discharge supports?: Child  Prior Functional Level: Ambulatory, Drives Self, Independent with Activities of Daily Living, Independent with Medication Management  Difficulity with ADLs: None  Difficulity with IADLs: None    Functional Assesment  Prior Functional Level: Ambulatory, Drives Self, Independent with Activities of Daily Living, Independent with Medication Management    Finances  Financial Barriers to Discharge: No  Prescription Coverage: Yes ([employer] [address])    Vision / Hearing Impairment  Vision Impairment : No  Right Eye Vision: Wears Glasses  Left Eye Vision: Wears  Glasses  Hearing Impairment : No    Values / Beliefs / Concerns  Spiritual Requests During Hospitalization: No    Advance Directive  Advance Directive?: None    Domestic Abuse  Have you ever been the victim of abuse or violence?: No  Physical Abuse or Sexual Abuse: No  Verbal Abuse or Emotional Abuse: No  Possible Abuse Reported to:: Not Applicable    Psychological Assessment  History of Substance Abuse: None  History of Psychiatric Problems: No  Non-compliant with Treatment: No  Newly Diagnosed Illness: No    Discharge Risks or Barriers  Discharge risks or barriers?: No    Anticipated Discharge Information  Anticipated discharge disposition: Discharge needs currently unknown  Discharge Address: [address] [place] [address], [place] [address]  Discharge Contact Phone Number: [phone]

## 2017-09-19 NOTE — OR SURGEON
Operative Report    PreOp Diagnosis: Right lower lobe lung cancer    PostOp Diagnosis: Same    Procedure(s):  RIGHT THORACOSCOPY- LOBECTOMY LOWER - Wound Class: Clean Contaminated  NODE DISSECTION - Wound Class: Clean Contaminated    Surgeon(s):  John H Ganser, M.D. David L Penner, M.D.    Anesthesiologist/Type of Anesthesia:  Anesthesiologist: Santos George M.D./General    Surgical Staff:  Assistant: CHETNA Roman  Circulator: Amado Maciel R.N.  Relief Circulator: Valeri Hinton R.N.  Scrub Person: Antony Pennington    Specimens:  * No specimens in log *    Estimated Blood Loss: 75ml    Findings: Inflammatory adhesions    Complications: 0        9/18/2017 5:22 PM John H Ganser

## 2017-09-19 NOTE — CARE PLAN
Problem: Safety  Goal: Will remain free from injury  Outcome: PROGRESSING AS EXPECTED  Proper fall precautions in place. Call light within reach and encouraged to use. Hourly rounding in practice. Bed alarm refuesed.     Problem: Infection  Goal: Will remain free from infection  Outcome: PROGRESSING AS EXPECTED  Pt educated on the s/s of infection, verbalizes understanding, will continue to monitor.

## 2017-09-19 NOTE — PROGRESS NOTES
Pt up to unit via transport at approximately 2200. Pt able to transfer to bed with a 1 assist; tolerated well. Pt has right sided chest tube hooked up to 20mm continuous wall suction with moderate sanguinous drainage. When hooked up to suction, air bubbles noted. Dressing clean, dry, and intact as well as connections in place. Dr. Ganser paged at approximately 2300 and stated that he doesn't want to be paged for an air leak unless the pt is symptomatic or the condition worsens. No new orders given. Pt on 2L NC; tolerating well, may be able to titrate down during day shift. Pt updated on POC and all questions answered at this time. Pt educated on IS use; able to pull 1000 after 4 tries, encouragement in place. Bed in lowest position, call light within reach, and no other needs at this time.

## 2017-09-19 NOTE — PROGRESS NOTES
"Progress Note:    S: Doing well  Pain controlled    O:  Recent Labs      09/19/17   0335   WBC  13.4*   RBC  4.13*   HEMOGLOBIN  13.2   HEMATOCRIT  39.8   MCV  96.4   MCH  32.0   MCHC  33.2*   RDW  49.7   PLATELETCT  210   MPV  10.3     Recent Labs      09/19/17   0335   SODIUM  137   POTASSIUM  3.8   CHLORIDE  105   CO2  21   GLUCOSE  132*   BUN  13   CREATININE  0.63   CALCIUM  8.6         Current Facility-Administered Medications   Medication Dose   • lidocaine-prilocaine (EMLA) 2.5-2.5 % cream 1 Application  1 Application    Or   • lidocaine (XYLOCAINE) 1%  injection  0.5 mL   • simvastatin (ZOCOR) tablet 40 mg  40 mg   • enalaprilat (VASOTEC) injection 2.5 mg  2.5 mg   • enoxaparin (LOVENOX) inj 40 mg  40 mg   • famotidine (PEPCID) tablet 20 mg  20 mg    Or   • famotidine (PEPCID) injection 20 mg  20 mg   • hydrALAZINE (APRESOLINE) injection 10 mg  10 mg   • hydrocodone-acetaminophen (NORCO) 5-325 MG per tablet 1-2 Tab  1-2 Tab   • lactated ringers infusion  1,000 mL   • lorazepam (ATIVAN) injection 0.5-1 mg  0.5-1 mg   • morphine (pf) 4 mg/ml injection 2 mg  2 mg   • ondansetron (ZOFRAN ODT) dispertab 4 mg  4 mg   • ondansetron (ZOFRAN) syringe/vial injection 4 mg  4 mg   • promethazine (PHENERGAN) suppository 25 mg  25 mg       PE:  Blood pressure 115/62, pulse (!) 58, temperature 36.5 °C (97.7 °F), resp. rate 18, height 1.651 m (5' 5\"), weight 64 kg (141 lb 1.5 oz), SpO2 95 %, not currently breastfeeding.    Intake/Output Summary (Last 24 hours) at 09/19/17 0825  Last data filed at 09/19/17 0500   Gross per 24 hour   Intake             1110 ml   Output              335 ml   Net              775 ml       Chest tube: air leak present  Wounds dry    Rads:  DX-CHEST-PORTABLE (1 VIEW)   Final Result         1.  Small recurrent right apical pneumothorax with thoracostomy tube in place.   2.  Linear densities in the bilateral lung base favors atelectasis.   3.  Soft tissue gas in the right chest wall.   4.  " Atherosclerosis      DX-CHEST-PORTABLE (1 VIEW)   Final Result      No pneumothorax is identified following right thoracotomy and chest tube placement      Diffuse ill-defined opacity likely indicates edema and there is more focal right basilar consolidation and/or a nodule. Attention in follow-up is advised          A:   Active Hospital Problems    Diagnosis   • Malignant neoplasm of lower lobe of lung (CMS-HCC) [C34.30]         P: Continue chest tube to suction  Ambulate/IS  Await path    John Ganser M.D.  [facility]

## 2017-09-19 NOTE — CARE PLAN
Problem: Pain Management  Goal: Pain level will decrease to patient's comfort goal  Outcome: PROGRESSING AS EXPECTED  Patient having complaints of pain 5/10, medicated per MAR.      Problem: Respiratory:  Goal: Respiratory status will improve  Outcome: PROGRESSING AS EXPECTED  Patient has right chest tube to suction.  Patient on 1L oxygen by nasal canula with O2 saturations >90%.

## 2017-09-20 ENCOUNTER — APPOINTMENT (OUTPATIENT)
Dept: RADIOLOGY | Facility: MEDICAL CENTER | Age: 77
DRG: 164 | End: 2017-09-20
Attending: SURGERY
Payer: MEDICARE

## 2017-09-20 PROCEDURE — A9270 NON-COVERED ITEM OR SERVICE: HCPCS | Performed by: PHYSICIAN ASSISTANT

## 2017-09-20 PROCEDURE — 71010 DX-CHEST-PORTABLE (1 VIEW): CPT

## 2017-09-20 PROCEDURE — 770006 HCHG ROOM/CARE - MED/SURG/GYN SEMI*

## 2017-09-20 PROCEDURE — 700102 HCHG RX REV CODE 250 W/ 637 OVERRIDE(OP): Performed by: PHYSICIAN ASSISTANT

## 2017-09-20 PROCEDURE — 700111 HCHG RX REV CODE 636 W/ 250 OVERRIDE (IP): Performed by: PHYSICIAN ASSISTANT

## 2017-09-20 RX ADMIN — HYDROCODONE BITARTRATE AND ACETAMINOPHEN 2 TABLET: 5; 325 TABLET ORAL at 15:29

## 2017-09-20 RX ADMIN — SIMVASTATIN 40 MG: 40 TABLET, FILM COATED ORAL at 20:19

## 2017-09-20 RX ADMIN — FAMOTIDINE 20 MG: 20 TABLET, FILM COATED ORAL at 20:19

## 2017-09-20 RX ADMIN — FAMOTIDINE 20 MG: 20 TABLET, FILM COATED ORAL at 08:26

## 2017-09-20 RX ADMIN — HYDROCODONE BITARTRATE AND ACETAMINOPHEN 2 TABLET: 5; 325 TABLET ORAL at 20:19

## 2017-09-20 RX ADMIN — HYDROCODONE BITARTRATE AND ACETAMINOPHEN 2 TABLET: 5; 325 TABLET ORAL at 11:02

## 2017-09-20 RX ADMIN — HYDROCODONE BITARTRATE AND ACETAMINOPHEN 2 TABLET: 5; 325 TABLET ORAL at 06:22

## 2017-09-20 RX ADMIN — ENOXAPARIN SODIUM 40 MG: 100 INJECTION SUBCUTANEOUS at 08:26

## 2017-09-20 ASSESSMENT — PAIN SCALES - GENERAL
PAINLEVEL_OUTOF10: 6
PAINLEVEL_OUTOF10: 5
PAINLEVEL_OUTOF10: 4
PAINLEVEL_OUTOF10: 7
PAINLEVEL_OUTOF10: 6
PAINLEVEL_OUTOF10: 7
PAINLEVEL_OUTOF10: 5
PAINLEVEL_OUTOF10: 8

## 2017-09-20 ASSESSMENT — ENCOUNTER SYMPTOMS
CHILLS: 0
COUGH: 1
VOMITING: 0
NAUSEA: 0
FEVER: 0
SHORTNESS OF BREATH: 1

## 2017-09-20 NOTE — CARE PLAN
Problem: Safety  Goal: Will remain free from injury  Outcome: PROGRESSING AS EXPECTED  Proper fall precautions in place. Call light within reach and encouraged to use. Hourly rounding in practice. Bed alarm refused.     Problem: Infection  Goal: Will remain free from infection  Outcome: PROGRESSING AS EXPECTED  Pt educated on the s/s of infection, verbalizes understanding, will continue to monitor.

## 2017-09-20 NOTE — CARE PLAN
Problem: Pain Management  Goal: Pain level will decrease to patient's comfort goal  Outcome: PROGRESSING AS EXPECTED  Patient having complaints of pain 5/10, medicated per MAR.      Problem: Respiratory:  Goal: Respiratory status will improve  Outcome: PROGRESSING AS EXPECTED  Patient has right chest tube to 40 suction, with serosanguinous output.  Patient remains on 1L nasal canula with O2 saturations >90%.

## 2017-09-20 NOTE — PROGRESS NOTES
Received report from evening RN.  Assumed care of patient.  Patient A&Ox4.  C/O pain 5/10, medicated per MAR.  Right chest tube to 40 of suction.  Air leak in place, MD aware.  Patient has subcutaneous emphysema on right chest tube site, extending into face, cheeks, bilateral sides of neck, right shoulder blade and into back.  MD aware per night shift RN.  Patient on 1L with O2 saturations >90%.  No increased oxygen needs at this time.  Serosanguinous output from chest tube.  Patient up with 1 person assist.  Plan of care discussed.  Call light within reach.  Bed in lowest position.

## 2017-09-20 NOTE — PROGRESS NOTES
"Assumed care of pt at 1900, report given. Pt sitting up in bed this PM with no complaints of severe pain, nausea, or SOB. Pt has right sided chest tube hooked up to  continuous wall suction with moderate sanguinous drainage. Air bubbles present for air leak; MD aware. Dressing clean, dry, and intact as well as connections in place. Pt visibly \"swollen\" up around neck and into cheeks. Sub q air and crepitus felt in area down to right side of chest. Dr. Ganser paged at approximately 2200 and updated. Orders to increase suction to 40mm and see if pt can tolerate lying on right side of body for approximately 30 minutes given. Pt on 2L NC; tolerating well.  Pt updated on POC and all questions answered at this time. Pt educated and encouraged on IS use. Bed in lowest position, call light within reach, and no other needs at this time.   "

## 2017-09-21 PROCEDURE — 700102 HCHG RX REV CODE 250 W/ 637 OVERRIDE(OP): Performed by: PHYSICIAN ASSISTANT

## 2017-09-21 PROCEDURE — A9270 NON-COVERED ITEM OR SERVICE: HCPCS | Performed by: PHYSICIAN ASSISTANT

## 2017-09-21 PROCEDURE — 770006 HCHG ROOM/CARE - MED/SURG/GYN SEMI*

## 2017-09-21 RX ADMIN — HYDROCODONE BITARTRATE AND ACETAMINOPHEN 2 TABLET: 5; 325 TABLET ORAL at 04:37

## 2017-09-21 RX ADMIN — SIMVASTATIN 40 MG: 40 TABLET, FILM COATED ORAL at 20:59

## 2017-09-21 RX ADMIN — HYDROCODONE BITARTRATE AND ACETAMINOPHEN 2 TABLET: 5; 325 TABLET ORAL at 20:59

## 2017-09-21 RX ADMIN — HYDROCODONE BITARTRATE AND ACETAMINOPHEN 2 TABLET: 5; 325 TABLET ORAL at 14:25

## 2017-09-21 RX ADMIN — FAMOTIDINE 20 MG: 20 TABLET, FILM COATED ORAL at 20:59

## 2017-09-21 ASSESSMENT — PAIN SCALES - GENERAL
PAINLEVEL_OUTOF10: 3
PAINLEVEL_OUTOF10: 7
PAINLEVEL_OUTOF10: 7
PAINLEVEL_OUTOF10: 5
PAINLEVEL_OUTOF10: 0

## 2017-09-21 ASSESSMENT — LIFESTYLE VARIABLES: DO YOU DRINK ALCOHOL: NO

## 2017-09-21 NOTE — CARE PLAN
Problem: Safety  Goal: Will remain free from injury  Outcome: PROGRESSING AS EXPECTED  Pt is up with one assist to the bathroom. Pt calls appropriately. Treaded slipper socks in place, personal belongings within reach, call light within reach. Pt educated regarding ability to call RN directly using hospital phone and extension.     Problem: Mobility  Goal: Risk for activity intolerance will decrease  Outcome: PROGRESSING AS EXPECTED  Discussed benefits of mobilization with pt. Pt verbalizes willingness to mobilize in the morning. Pt gets up with one assist to the bathroom, pt is steady.

## 2017-09-21 NOTE — PROGRESS NOTES
"Progress Note:    S: Doing better  Less SQ air    O:  Recent Labs      09/19/17   0335   WBC  13.4*   RBC  4.13*   HEMOGLOBIN  13.2   HEMATOCRIT  39.8   MCV  96.4   MCH  32.0   MCHC  33.2*   RDW  49.7   PLATELETCT  210   MPV  10.3     Recent Labs      09/19/17   0335   SODIUM  137   POTASSIUM  3.8   CHLORIDE  105   CO2  21   GLUCOSE  132*   BUN  13   CREATININE  0.63   CALCIUM  8.6         Current Facility-Administered Medications   Medication Dose   • lidocaine-prilocaine (EMLA) 2.5-2.5 % cream 1 Application  1 Application    Or   • lidocaine (XYLOCAINE) 1%  injection  0.5 mL   • simvastatin (ZOCOR) tablet 40 mg  40 mg   • enalaprilat (VASOTEC) injection 2.5 mg  2.5 mg   • enoxaparin (LOVENOX) inj 40 mg  40 mg   • famotidine (PEPCID) tablet 20 mg  20 mg    Or   • famotidine (PEPCID) injection 20 mg  20 mg   • hydrALAZINE (APRESOLINE) injection 10 mg  10 mg   • hydrocodone-acetaminophen (NORCO) 5-325 MG per tablet 1-2 Tab  1-2 Tab   • lactated ringers infusion  1,000 mL   • lorazepam (ATIVAN) injection 0.5-1 mg  0.5-1 mg   • morphine (pf) 4 mg/ml injection 2 mg  2 mg   • ondansetron (ZOFRAN ODT) dispertab 4 mg  4 mg   • ondansetron (ZOFRAN) syringe/vial injection 4 mg  4 mg   • promethazine (PHENERGAN) suppository 25 mg  25 mg       PE:  Blood pressure 152/82, pulse 69, temperature 37 °C (98.6 °F), resp. rate 17, height 1.651 m (5' 5\"), weight 64 kg (141 lb 1.5 oz), SpO2 96 %, not currently breastfeeding.    Intake/Output Summary (Last 24 hours) at 09/21/17 0857  Last data filed at 09/21/17 0400   Gross per 24 hour   Intake             1000 ml   Output              650 ml   Net              350 ml       Chest tube: twisted at skin and occluded.   No air leak after straightened and milked    Rads:  DX-CHEST-PORTABLE (1 VIEW)   Final Result         1.  Small residual right apical pneumothorax with thoracostomy tube in place. Appears similar to prior study.   2.  Linear densities in the bilateral lung base favors " atelectasis.   3.  Markedly increased soft tissue gas in the bilateral chest wall and neck base, likely related to thoracostomy tube leak.   4.  Atherosclerosis      DX-CHEST-PORTABLE (1 VIEW)   Final Result         1.  Small recurrent right apical pneumothorax with thoracostomy tube in place.   2.  Linear densities in the bilateral lung base favors atelectasis.   3.  Soft tissue gas in the right chest wall.   4.  Atherosclerosis      DX-CHEST-PORTABLE (1 VIEW)   Final Result      No pneumothorax is identified following right thoracotomy and chest tube placement      Diffuse ill-defined opacity likely indicates edema and there is more focal right basilar consolidation and/or a nodule. Attention in follow-up is advised          A:   Active Hospital Problems    Diagnosis   • Malignant neoplasm of lower lobe of lung (CMS-HCC) [C34.30]         P: Will leave chest tube 1 more day, decrease suction back to 20  Discussed path  Anticipate discharge tomorrow    John Ganser M.D.  [facility]

## 2017-09-21 NOTE — PROGRESS NOTES
Bedside report received.  Assessment complete.  A&O x 4. Patient calls appropriately.  Denies numbness and tingling. Moves all extremities.   Patient up with one assist.   Patient has 7/10 pain. Medication provided  Denies N&V. Tolerating regular diet.  Chest tube right side in place.  positive void, positve flatus  Patient denies SOB.  SCD's on.  Review plan with of care with patient. Call light and personal belongings with in reach. Hourly rounding in place. All needs met at this time.

## 2017-09-21 NOTE — CARE PLAN
Problem: Infection  Goal: Will remain free from infection  Outcome: PROGRESSING AS EXPECTED  Incisions will remain free from signs of infx. RNs will change dressing PRN, maintain universal precautions    Problem: Respiratory:  Goal: Respiratory status will improve  Outcome: PROGRESSING AS EXPECTED  Pt will eventually lower to 0LPM. Lowered down to 1/2LPM today, tolerating well

## 2017-09-21 NOTE — PROGRESS NOTES
Surgical Progress Note    Author: Alcides Whalen Date & Time created: 2017   5:28 PM     Interval Events:  S/p Thoracoscopic right lower lobectomy, lymph node   Dissection-POD#2, doing better; less SC emphysema; pain worse with coughing, but currently well controlled; ambulatory; jimenez po; using IS  Review of Systems   Constitutional: Negative for chills and fever.   Respiratory: Positive for cough and shortness of breath.    Cardiovascular: Negative for leg swelling.   Gastrointestinal: Negative for nausea and vomiting.   Skin: Negative for itching and rash.     Hemodynamics:  Temp (24hrs), Av.7 °C (98.1 °F), Min:36.3 °C (97.3 °F), Max:37.4 °C (99.3 °F)  Temperature: 36.3 °C (97.3 °F)  Pulse  Av.6  Min: 57  Max: 83   Blood Pressure : 154/74     Respiratory:    Respiration: 16, Pulse Oximetry: 94 %  Chest Tube Group Right PEDRO 19-Tube Status / Drainage: Draining;Serosanguinous, Chest Tube Group Right PEDRO 19-Device: Air Leak Present;Suction Other (Comments) (40 cm suction )     RUL Breath Sounds: Clear, RML Breath Sounds: Diminished, RLL Breath Sounds: Diminished, HARIKA Breath Sounds: Clear, LLL Breath Sounds: Diminished  Neuro:  GCS       Fluids:    Intake/Output Summary (Last 24 hours) at 17 1728  Last data filed at 17 1140   Gross per 24 hour   Intake              960 ml   Output              895 ml   Net               65 ml        Current Diet Order   Procedures   • DIET ORDER     Physical Exam   Constitutional: She is oriented to person, place, and time. No distress.   Cardiovascular: Regular rhythm.    Pulmonary/Chest: Effort normal. No respiratory distress.   Chest tube in place, ~ 30ml serosang output/12 hrs  Very small air leak seen  SC emphysema noted on right thorax, neck  Wounds c/d/i   Abdominal: Soft.   Neurological: She is alert and oriented to person, place, and time.   Skin: Skin is warm and dry.   Psychiatric: She has a normal mood and affect.     Labs:  No results found for  this or any previous visit (from the past 24 hour(s)).  Medical Decision Making, by Problem:  Active Hospital Problems    Diagnosis   • Malignant neoplasm of lower lobe of lung (CMS-HCC) [C34.30]     Plan:  Continue chest tube at current setting (-40cm)  Will likely put chest tube on water seal tomorrow and check subsequent CXR. If looks good, then we can remove chest tube and send patient home (? Friday).  Will need to assess oximetry tomorrow for possible home O2 needs  IS  Mobilize    Quality Measures:    Reviewed items::  Radiology images reviewed, Labs reviewed and Medications reviewed  Bethea catheter::  No Bethea  DVT prophylaxis pharmacological::  Enoxaparin (Lovenox)  DVT prophylaxis - mechanical:  SCDs  Ulcer Prophylaxis::  Yes      Discussed patient condition with Family, RN, Patient and Dr. Ganser

## 2017-09-22 ENCOUNTER — HOME HEALTH ADMISSION (OUTPATIENT)
Dept: HOME HEALTH SERVICES | Facility: HOME HEALTHCARE | Age: 77
End: 2017-09-22
Payer: MEDICARE

## 2017-09-22 ENCOUNTER — PATIENT OUTREACH (OUTPATIENT)
Dept: OTHER | Facility: MEDICAL CENTER | Age: 77
End: 2017-09-22

## 2017-09-22 VITALS
OXYGEN SATURATION: 91 % | HEIGHT: 65 IN | DIASTOLIC BLOOD PRESSURE: 81 MMHG | WEIGHT: 141.09 LBS | TEMPERATURE: 98.2 F | RESPIRATION RATE: 17 BRPM | BODY MASS INDEX: 23.51 KG/M2 | HEART RATE: 72 BPM | SYSTOLIC BLOOD PRESSURE: 153 MMHG

## 2017-09-22 PROCEDURE — A9270 NON-COVERED ITEM OR SERVICE: HCPCS | Performed by: PHYSICIAN ASSISTANT

## 2017-09-22 PROCEDURE — 700102 HCHG RX REV CODE 250 W/ 637 OVERRIDE(OP): Performed by: PHYSICIAN ASSISTANT

## 2017-09-22 RX ORDER — HYDROCODONE BITARTRATE AND ACETAMINOPHEN 5; 325 MG/1; MG/1
1-2 TABLET ORAL EVERY 4 HOURS PRN
Qty: 30 TAB | Refills: 0 | Status: SHIPPED | OUTPATIENT
Start: 2017-09-22 | End: 2018-10-19

## 2017-09-22 RX ORDER — HYDROCODONE BITARTRATE AND ACETAMINOPHEN 5; 325 MG/1; MG/1
1-2 TABLET ORAL EVERY 4 HOURS PRN
Qty: 30 TAB | Refills: 0 | Status: SHIPPED | OUTPATIENT
Start: 2017-09-22 | End: 2017-09-22

## 2017-09-22 RX ADMIN — HYDROCODONE BITARTRATE AND ACETAMINOPHEN 1 TABLET: 5; 325 TABLET ORAL at 15:50

## 2017-09-22 RX ADMIN — HYDROCODONE BITARTRATE AND ACETAMINOPHEN 1 TABLET: 5; 325 TABLET ORAL at 10:35

## 2017-09-22 RX ADMIN — HYDROCODONE BITARTRATE AND ACETAMINOPHEN 2 TABLET: 5; 325 TABLET ORAL at 05:13

## 2017-09-22 ASSESSMENT — ENCOUNTER SYMPTOMS
FEVER: 0
NAUSEA: 0
CHILLS: 0
SHORTNESS OF BREATH: 1
VOMITING: 0

## 2017-09-22 ASSESSMENT — PAIN SCALES - GENERAL
PAINLEVEL_OUTOF10: 2
PAINLEVEL_OUTOF10: 2
PAINLEVEL_OUTOF10: 8
PAINLEVEL_OUTOF10: 5
PAINLEVEL_OUTOF10: 5
PAINLEVEL_OUTOF10: 2

## 2017-09-22 NOTE — CARE PLAN
Problem: Safety  Goal: Will remain free from falls  Outcome: PROGRESSING AS EXPECTED  Bed in lowest position, treaded socks on, call light within reach and hourly rounding in place      Problem: Pain Management  Goal: Pain level will decrease to patient's comfort goal  Outcome: PROGRESSING AS EXPECTED  1-10 scale used, pain meds being used- pt states that they are controlling her pain well

## 2017-09-22 NOTE — FACE TO FACE
Face to Face Supporting Documentation - Home Health    The encounter with this patient was in whole or in part the primary reason for home health admission.    Date of encounter:   Patient:                    MRN:                       YOB: 2018  Cheli Parks  [id number]  1940     Home health to see patient for:  Skilled Nursing care for assessment, interventions & education    Skilled need for:  Surgical Aftercare s/p right lower lobectomy for lung cancer    Skilled nursing interventions to include:  Comment: needs assessment    Homebound status evidenced by:  Needs the assistance of another person in order to leave the home. Leaving home requires a considerable and taxing effort. There is a normal inability to leave the home.    Community Physician to provide follow up care: Danny Buchanan M.D.     Optional Interventions? No      I certify the face to face encounter for this home health care referral meets the CMS requirements and the encounter/clinical assessment with the patient was, in whole, or in part, for the medical condition(s) listed above, which is the primary reason for home health care. Based on my clinical findings: the service(s) are medically necessary, support the need for home health care, and the homebound criteria are met.  I certify that this patient has had a face to face encounter by myself.  OBED Roman. - NPI: [id number]

## 2017-09-22 NOTE — PROGRESS NOTES
AAOx4.   1 assist with FWW.    BA refused.  Pulls 750 on IS, educated on importance.  Pt states understanding.   Pain well controlled per the MAR.  Regular diet, tolerating well.   -n/v.  Voids up to bathroom. +BS.  +flatus.

## 2017-09-22 NOTE — DISCHARGE PLANNING
[facility]S received a DME and HH choice forms per the choice forms the referral has been sent to [facility] and [facility]

## 2017-09-22 NOTE — PROGRESS NOTES
Received report from day nurse. Pt is sitting up in bed. AOx4. VSS. Pt reports pain 7/10- medicated per MAR,   Lung sounds are slightly diminished in the lower lobes. LBM- PTA. Bowel sounds are normoactive x4. Pt is a SBA to the bathroom. Pt is on 2L nasal cannula- home oxygen was ordered for this pt. Chest tube assessed- dressing is CDI, 40 ml of serousanginous output since 1600.    Quiet pack provided for pt to sleep better tonight.\    POC discussed and all questions answered at this time.

## 2017-09-22 NOTE — PROGRESS NOTES
Cancer Nurse Navigation:    Met with patient, introduced self and explained role of nurse navigation.  Pt stating she is still very sore and having difficulty taking deep breathes related to this.  She talked about needing oxygen, but feels she will only need it for a short period.  Pt saying she doesn't know how she is going to take a shower by herself and afraid of getting dizzy.  She reports many of the people she knows get help at home.  She talked about meals on wheels, but that she didn't need that much help.  Asking about resource of home health stating she has [facility] when navigator reviewing [place] for Cancer Resources.  Discussed with patient that would be more related to her current surgery and discharge planning.  Talked to patient that specific requirements need to be met to justify need for home health.  Pt states that she feels she can not be at home without help.  Notified RN regarding concern and PA will come talk to patient.  Talked to patient regarding smoking cessation as well.  She reported has not smoked for 1.5 weeks but feels continued cessation will be difficult for her.  Provided additional education and verbalized understanding of this being very difficult but stressed importance.  Pt giving multiple reasons why this will be difficult. Provided her information on [facility] program as well as free smoking cessation classes that are coming up.  Pt states does not really of have friends or family to help.  Did state she lives on fixed income and feels she would qualify for more assistance.  PA talked to patient and repeated similar information regarding Home Health requirements.  After patient repeating multiple times she needs assistance will have home health evaluate need.  Nurse navigator will follow up next week to see how patient is doing and if additional resources need to be looked into.  Pt feels that the [facility] may have resources.  Encouraged her to reach out to them  for additional support.  Pt also making comments about that she will probably have to call oxygen company as well.  Discussed that oxygen would be arranged on discharge.  Pt stating she is a 5 or 6 on oncology distress scale related to above concerns.  Talked to patient that she will be given instructions on discharge regarding all follow up.  Packet provided to patient with Cancer [place] resource which include support groups, healing touch and mind body workshops.  Will follow up next week to address any additional needs, barriers to care and provide education if needed.

## 2017-09-22 NOTE — DISCHARGE PLANNING
Care Transition Team Assessment    Information Source  Orientation : Oriented x 4  Information Given By: Patient  Informant's Name:  (Cheli)  Who is responsible for making decisions for patient? : Patient    Readmission Evaluation  Is this a readmission?: No    Elopement Risk  Legal Hold: No  Ambulatory or Self Mobile in Wheelchair: Yes  Disoriented: No  Psychiatric Symptoms: None  History of Wandering: No  Elopement this Admit: No  Vocalizing Wanting to Leave: No  Displays Behaviors, Body Language Wanting to Leave: No-Not at Risk for Elopement  Elopement Risk: Not at Risk for Elopement    Interdisciplinary Discharge Planning  Primary Care Physician: Danny Buchanan  Lives with - Patient's Self Care Capacity: Alone and Able to Care For Self  Patient or legal guardian wants to designate a caregiver (see row info): No  Support Systems: Family Member(s)  Housing / Facility: 3 Story Apartment / Condo  Do You Take your Prescribed Medications Regularly: Yes  Able to Return to Previous ADL's: Yes  Mobility Issues: No  Prior Services: None  Durable Medical Equipment: Not Applicable    Discharge Preparedness  What is your plan after discharge?: Home with help  What are your discharge supports?: Child  Prior Functional Level: Ambulatory  Difficulity with ADLs: None  Difficulity with IADLs: None    Functional Assesment  Prior Functional Level: Ambulatory    Finances  Financial Barriers to Discharge: Yes  Source of Income: Social Security  Prescription Coverage: Yes    Vision / Hearing Impairment  Vision Impairment : No  Right Eye Vision: Impaired, Wears Glasses  Left Eye Vision: Impaired, Wears Glasses  Hearing Impairment : No    Values / Beliefs / Concerns  Spiritual Requests During Hospitalization: No    Advance Directive  Advance Directive?: None  Advance Directive offered?: AD Booklet refused    Domestic Abuse  Have you ever been the victim of abuse or violence?: No  Physical Abuse or Sexual Abuse: No  Verbal Abuse or Emotional  Abuse: No  Possible Abuse Reported to:: Not Applicable    Psychological Assessment  History of Substance Abuse: None  History of Psychiatric Problems: No  Non-compliant with Treatment: No  Newly Diagnosed Illness: No    Discharge Risks or Barriers  Discharge risks or barriers?: No    Anticipated Discharge Information  Anticipated discharge disposition: Home  Discharge Address:  ([address] [place] [address] [address] [place] [address])  Discharge Contact Phone Number: [phone]    SW met with pt at bedside.  Pt states she lives alone and her son Rafael provides intermintent assistance with ADLS and shopping.  Pt states her son will transport her home today.

## 2017-09-22 NOTE — DISCHARGE PLANNING
Referral: Discharge Planning    Intervention: SW received orders for HHC and Home O2.  SW met with pt at bedside for the purpose of discussing choice.  Pt states her [facility], choice forms signed and provided to ROSSY Wheeler.    Plan: Home with HHC and O2, pending HHC acceptance and the delivery of the portable tank.

## 2017-09-22 NOTE — PROGRESS NOTES
Surgical Progress Note    Author: Alcides Whalen Date & Time created: 2017   9:17 AM     Interval Events:  S/p Thoracoscopic right lower lobectomy, lymph node   Dissection-POD#4; no overnight events; pain controlled; jimenez po; ambulatory; using IS  Review of Systems   Constitutional: Negative for chills and fever.   Respiratory: Positive for shortness of breath.    Cardiovascular: Negative for chest pain and leg swelling.   Gastrointestinal: Negative for nausea and vomiting.   Skin: Negative for itching and rash.     Hemodynamics:  Temp (24hrs), Av.6 °C (97.8 °F), Min:35.9 °C (96.7 °F), Max:37.3 °C (99.2 °F)  Temperature: 36.6 °C (97.8 °F)  Pulse  Av.4  Min: 57  Max: 83   Blood Pressure : 140/76     Respiratory:    Respiration: 16, Pulse Oximetry: 98 %  Chest Tube Group Right PEDRO 19-Tube Status / Drainage: Subcutaneous Air;Patent;Scant;Serosanguinous, Chest Tube Group Right PEDRO 19-Device: Suction 20 cm Water     RUL Breath Sounds: Clear, RML Breath Sounds: Crackles, RLL Breath Sounds: Diminished;Crackles, HARIKA Breath Sounds: Clear, LLL Breath Sounds: Diminished  Neuro:  GCS       Fluids:    Intake/Output Summary (Last 24 hours) at 17 0917  Last data filed at 17 0720   Gross per 24 hour   Intake              480 ml   Output             1445 ml   Net             -965 ml        Current Diet Order   Procedures   • DIET ORDER     Physical Exam   Constitutional: She is oriented to person, place, and time. No distress.   Cardiovascular: Regular rhythm.    Pulmonary/Chest: Effort normal. No respiratory distress.   Chest tube in place, ~ trace serous output/12 hrs  No airleak seen  Wounds c/d/i   Abdominal: Soft.   Neurological: She is alert and oriented to person, place, and time.   Skin: Skin is warm and dry.   Psychiatric: She has a normal mood and affect.   Nursing note and vitals reviewed.    Labs:  No results found for this or any previous visit (from the past 24 hour(s)).  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  Pt counseled re: diet , activity, wound care, I.S., and home med's.  May shower over Tegaderms.  Remove Tegaderms on Sunday (9/24/17)  No baths, hot tubs, soaks for 7 days  No lifting >20 lbs for 1 wk  No driving for 4-5 days   F/U with Dr. Ganser in 1-2 weeks    Quality Measures:    Reviewed items::  Radiology images reviewed, Labs reviewed and Medications reviewed  Bethea catheter::  No Bethea  DVT prophylaxis pharmacological::  Enoxaparin (Lovenox)  DVT prophylaxis - mechanical:  SCDs  Ulcer Prophylaxis::  Yes      Discussed patient condition with RN, Patient and Dr. Ganser

## 2017-09-22 NOTE — DISCHARGE INSTRUCTIONS
Discharge Instructions    Discharged to home by car with relative. Discharged via wheelchair, hospital escort: Yes.  Special equipment needed: Not Applicable    Be sure to schedule a follow-up appointment with your primary care doctor or any specialists as instructed.     Discharge Plan:   Diet Plan: Discussed  Activity Level: Discussed  Smoking Cessation Offered: Patient Refused  Confirmed Follow up Appointment: Patient to Call and Schedule Appointment  Confirmed Symptoms Management: Discussed  Medication Reconciliation Updated: Yes  Influenza Vaccine Indication: Patient Refuses    I understand that a diet low in cholesterol, fat, and sodium is recommended for good health. Unless I have been given specific instructions below for another diet, I accept this instruction as my diet prescription.   Other diet:     Special Instructions: None    · Is patient discharged on Warfarin / Coumadin?   No     · Is patient Post Blood Transfusion?  No    Depression / Suicide Risk    As you are discharged from this [facility] facility, it is important to learn how to keep safe from harming yourself.    Recognize the warning signs:  · Abrupt changes in personality, positive or negative- including increase in energy   · Giving away possessions  · Change in eating patterns- significant weight changes-  positive or negative  · Change in sleeping patterns- unable to sleep or sleeping all the time   · Unwillingness or inability to communicate  · Depression  · Unusual sadness, discouragement and loneliness  · Talk of wanting to die  · Neglect of personal appearance   · Rebelliousness- reckless behavior  · Withdrawal from people/activities they love  · Confusion- inability to concentrate     If you or a loved one observes any of these behaviors or has concerns about self-harm, here's what you can do:  · Talk about it- your feelings and reasons for harming yourself  · Remove any means that you might use to hurt yourself (examples: pills,  rope, extension cords, firearm)  · Get professional help from the community (Mental Health, Substance Abuse, psychological counseling)  · Do not be alone:Call your Safe Contact- someone whom you trust who will be there for you.  · Call your local CRISIS HOTLINE 803-5505 or 678-439-7477  · Call your local Children's Mobile Crisis Response Team [place] (235) 148-4035 or www.uBank  · Call the toll free National Suicide Prevention Hotlines   · National Suicide Prevention Lifeline 096-551-WEOC (8970)  · [employer] Hope Line Network 800-SUICIDE (174-3336)      Thoracoscopy, Care After  Refer to this sheet in the next few weeks. These instructions provide you with information about caring for yourself after your procedure. Your health care provider may also give you more specific instructions. Your treatment has been planned according to current medical practices, but problems sometimes occur. Call your health care provider if you have any problems or questions after your procedure.  WHAT TO EXPECT AFTER THE PROCEDURE:  After your procedure, it is common to feel sore for up to two weeks.  HOME CARE INSTRUCTIONS  · There are many different ways to close and cover an incision, including stitches (sutures), skin glue, and adhesive strips. Follow your health care provider's instructions about:  ¨ Incision care.  ¨ Bandage (dressing) changes and removal.  ¨ Incision closure removal.  · Check your incision area every day for signs of infection. Watch for:  ¨ Redness, swelling, or pain.  ¨ Fluid, blood, or pus.  · Take medicines only as directed by your health care provider.  · Try to cough often. Coughing helps to protect against lung infection (pneumonia). It may hurt to cough. If this happens, hold a pillow against your chest when you cough.  · Take deep breaths. This also helps to protect against pneumonia.  · If you were given an incentive spirometer, use it as directed by your health care provider.  · Do not  take baths, swim, or use a hot tub until your health care provider approves. You may take showers.  · Avoid lifting until your health care provider approves.  · Avoid driving until your health care provider approves.  · Do not travel by airplane after the chest tube is removed until your health care provider approves.  SEEK MEDICAL CARE IF:  · You have a fever.  · Pain medicines do not ease your pain.  · You have redness, swelling, or increasing pain in your incision area.  · You develop a cough that does not go away, or you are coughing up mucus that is yellow or green.  SEEK IMMEDIATE MEDICAL CARE IF:  · You have fluid, blood, or pus coming from your incision.  · There is a bad smell coming from your incision or dressing.  · You develop a rash.  · You have difficulty breathing.  · You cough up blood.  · You develop light-headedness or you feel faint.  · You develop chest pain.  · Your heartbeat feels irregular or very fast.     This information is not intended to replace advice given to you by your health care provider. Make sure you discuss any questions you have with your health care provider.     Document Released: 07/07/2006 Document Revised: 01/08/2016 Document Reviewed: 09/02/2015  [employer] Interactive Patient Education ©2016 [employer]    Oxygen Use at Home  Oxygen can be prescribed for home use. The prescription will show the flow rate. This is how much oxygen is to be used per minute. This will be listed in liters per minute (LPM or L/M). A liter is a metric measurement of volume.  You will use oxygen therapy as directed. It can be used while exercising, sleeping, or at rest. You may need oxygen continuously. Your health care provider may order a blood oxygen test (arterial blood gas or pulse oximetry test) that will show what your oxygen level is. Your health care provider will use these measurements to learn about your needs and follow your progress.  Home oxygen therapy is commonly used on patients  with various lung (pulmonary) related conditions. Some of these conditions include:  · Asthma.  · Lung cancer.  · Pneumonia.  · Emphysema.  · Chronic bronchitis.  · Cystic fibrosis.  · Other lung diseases.  · Pulmonary fibrosis.  · Occupational lung disease.  · Heart failure.  · Chronic obstructive pulmonary disease (COPD).  3 COMMON WAYS OF PROVIDING OXYGEN THERAPY  · Gas: The gas form of oxygen is put into variously sized cylinders or tanks. The cylinders or oxygen tanks contain compressed oxygen. The cylinder is equipped with a regulator that controls the flow rate. Because the flow of oxygen out of the cylinder is constant, an oxygen conserving device may be attached to the system to avoid waste. This device releases the gas only when you inhale and cuts it off when you exhale. Oxygen can be provided in a small cylinder that can be carried with you. Large tanks are heavy and are only for stationary use. After use, empty tanks must be exchanged for full tanks.  · Liquid: The liquid form of oxygen is put into a container similar to a thermos. When released, the liquid converts to a gas and you breathe it in just like the compressed gas. This storage method takes up less space than the compressed gas cylinder, and you can transfer the liquid to a small, portable vessel at home. Liquid oxygen is more expensive than the compressed gas, and the vessel vents when not in use. An oxygen conserving device may be built into the vessel to conserve the oxygen. Liquid oxygen is very cold, around 297° below zero.  · Oxygen concentrator: This medical device filters oxygen from room air and gives almost 100% oxygen to the patient. Oxygen concentrators are powered by electricity. Benefits of this system are:  ¨ It does not need to be resupplied.  ¨ It is not as costly as liquid oxygen.  ¨ Extra tubing permits the user to move around easier.  There are several types of small, portable oxygen systems available which can help you  "remain active and mobile. You must have a cylinder of oxygen as a backup in the event of a power failure. Advise your electric power company that you are on oxygen therapy in order to get priority service when there is a power failure.  OXYGEN DELIVERY DEVICES  There are 3 common ways to deliver oxygen to your body.  · Nasal cannula. This is a 2-pronged device inserted in the nostrils that is connected to tubing carrying the oxygen. The tubing can rest on the ears or be attached to the frame of eyeglasses.  · Mask. People who need a high flow of oxygen generally use a mask.  · Transtracheal catheter. Transtracheal oxygen therapy requires the insertion of a small, flexible tube (catheter) in the windpipe (trachea). This catheter is held in place by a necklace. Since transtracheal oxygen bypasses the mouth, nose, and throat, a humidifier is absolutely required at flow rates of 1 LPM or greater.  OXYGEN USE SAFETY TIPS  · Never smoke while using oxygen. Oxygen does not burn or explode, but flammable materials will burn faster in the presence of oxygen.  · Keep a fire extinguisher close by. Let your fire department know that you have oxygen in your home.  · Warn visitors not to smoke near you when you are using oxygen. Put up \"no smoking\" signs in your home where you most often use the oxygen.  · When you go to a restaurant with your portable oxygen source, ask to be seated in the nonsmoking section.  · Stay at least 5 feet away from gas stoves, candles, lighted fireplaces, or other heat sources.  · Do not use materials that burn easily (flammable) while using your oxygen.  · If you use an oxygen cylinder, make sure it is secured to some fixed object or in a stand. If you use liquid oxygen, make sure the vessel is kept upright to keep the oxygen from pouring out. Liquid oxygen is so cold it can hurt your skin.  · If you use an oxygen concentrator, call your electric company so you will be given priority service if your " power goes out. Avoid using extension cords, if possible.  · Regularly test your smoke detectors at home to make sure they work. If you receive care in your home from a nurse or other health care provider, he or she may also check to make sure your smoke detectors work.  GUIDELINES FOR CLEANING YOUR EQUIPMENT  · Wash the nasal prongs with a liquid soap. Thoroughly rinse them once or twice a week.  · Replace the prongs every 2 to 4 weeks. If you have an infection (cold, pneumonia) change them when you are well.  · Your health care provider will give you instructions on how to clean your transtracheal catheter.  · The humidifier bottle should be washed with soap and warm water and rinsed thoroughly between each refill. Air-dry the bottle before filling it with sterile or distilled water. The bottle and its top should be disinfected after they are cleaned.  · If you use an oxygen concentrator, unplug the unit. Then wipe down the cabinet with a damp cloth and dry it daily. The air filter should be cleaned at least twice a week.  · Follow your home medical equipment and service company's directions for cleaning the compressor filter.  HOME CARE INSTRUCTIONS   · Do not change the flow of oxygen unless directed by your health care provider.  · Do not use alcohol or other sedating drugs unless instructed. They slow your breathing rate.  · Do not use materials that burn easily (flammable) while using your oxygen.  · Always keep a spare tank of oxygen. Plan ahead for holidays when you may not be able to get a prescription filled.  · Use water-based lubricants on your lips or nostrils. Do not use an oil-based product like petroleum jelly.  · To prevent your cheeks or the skin behind your ears from becoming irritated, tuck some gauze under the tubing.  · If you have persistent redness under your nose, call your health care provider.  · When you no longer need oxygen, your doctor will have the oxygen discontinued. Oxygen is not  addicting or habit forming.  · Use the oxygen as instructed. Too much oxygen can be harmful and too little will not give you the benefit you need.  · Shortness of breath is not always from a lack of oxygen. If your oxygen level is not the cause of your shortness of breath, taking oxygen will not help.  SEEK MEDICAL CARE IF:   · You have frequent headaches.  · You have shortness of breath or a lasting cough.  · You have anxiety.  · You are confused.  · You are drowsy or sleepy all the time.  · You develop an illness which aggravates your breathing.  · You cannot exercise.  · You are restless.  · You have blue lips or fingernails.  · You have difficult or irregular breathing and it is getting worse.  · You have a fever.     This information is not intended to replace advice given to you by your health care provider. Make sure you discuss any questions you have with your health care provider.     Document Released: 03/09/2005 Document Revised: 01/08/2016 Document Reviewed: 07/30/2014  [employer] Interactive Patient Education ©2016 [employer]

## 2017-09-22 NOTE — FACE TO FACE
Face to Face Note  -  Durable Medical Equipment    CHETNA Roman - NPI: [id number]  I certify that this patient is under my care and that they had a durable medical equipment(DME)face to face encounter by myself that meets the physician DME face-to-face encounter requirements with this patient on:    Date of encounter:   Patient:                    MRN:                       YOB: 2018  Cheli Parks  [id number]  1940     The encounter with the patient was in whole, or in part, for the following medical condition, which is the primary reason for durable medical equipment:  COPD, Post-Op Surgery and Other - lung cancer    I certify that, based on my findings, the following durable medical equipment is medically necessary:  Oxygen.    HOME O2 Saturation Measurements:(Values must be present for Home Oxygen orders)  Room air sat at rest: 90  Room air sat with amb: 83  With liters of O2: 1, O2 sat at rest with O2: 91  With Liters of O2: 1, O2 sat with amb with O2 : 91  Is the patient mobile?: Yes    My Clinical findings support the need for the above equipment due to:  Hypoxia    Supporting Symptoms: dyspnea on exertion, fatigue

## 2017-09-25 ENCOUNTER — PATIENT OUTREACH (OUTPATIENT)
Dept: HEALTH INFORMATION MANAGEMENT | Facility: OTHER | Age: 77
End: 2017-09-25

## 2017-09-26 ENCOUNTER — PATIENT OUTREACH (OUTPATIENT)
Dept: OTHER | Facility: MEDICAL CENTER | Age: 77
End: 2017-09-26

## 2017-09-26 ENCOUNTER — HOME CARE VISIT (OUTPATIENT)
Dept: HOME HEALTH SERVICES | Facility: HOME HEALTHCARE | Age: 77
End: 2017-09-26
Payer: MEDICARE

## 2017-09-26 ENCOUNTER — PATIENT OUTREACH (OUTPATIENT)
Dept: HEALTH INFORMATION MANAGEMENT | Facility: OTHER | Age: 77
End: 2017-09-26

## 2017-09-26 VITALS
DIASTOLIC BLOOD PRESSURE: 82 MMHG | HEART RATE: 67 BPM | RESPIRATION RATE: 20 BRPM | TEMPERATURE: 98.5 F | WEIGHT: 141.25 LBS | HEIGHT: 64 IN | SYSTOLIC BLOOD PRESSURE: 154 MMHG | BODY MASS INDEX: 24.11 KG/M2 | OXYGEN SATURATION: 97 %

## 2017-09-26 PROCEDURE — 665001 SOC-HOME HEALTH

## 2017-09-26 PROCEDURE — G0162 HHC RN E&M PLAN SVS, 15 MIN: HCPCS

## 2017-09-26 SDOH — ECONOMIC STABILITY: HOUSING INSECURITY: UNSAFE COOKING RANGE AREA: 0

## 2017-09-26 SDOH — ECONOMIC STABILITY: HOUSING INSECURITY

## 2017-09-26 SDOH — ECONOMIC STABILITY: HOUSING INSECURITY: UNSAFE APPLIANCES: 0

## 2017-09-26 SDOH — ECONOMIC STABILITY: HOUSING INSECURITY: HOME SAFETY: E PRESENCE OF SUPPLEMENTAL OXYGEN. PT DID VERBALIZE UNDERSTANDING.

## 2017-09-26 SDOH — ECONOMIC STABILITY: HOUSING INSECURITY
HOME SAFETY: OXYGEN SAFETY RISK ASSESSMENT PERFORMED. PATIENT PT WAS GIVEN A NO SMOKING SIGN AND PROVIDED EDUCATION ABOUT WHY IT IS IMPORTANT TO PLACE ONE. PATIENT DOES NOT HAVE A WORKING FIRE EXTINGUISHER PRESENT IN THE HOME. SMOKE ALARMS ARE PRESENT AND FUNCTIO

## 2017-09-26 ASSESSMENT — PATIENT HEALTH QUESTIONNAIRE - PHQ9
1. LITTLE INTEREST OR PLEASURE IN DOING THINGS: 00
2. FEELING DOWN, DEPRESSED, IRRITABLE, OR HOPELESS: 00

## 2017-09-26 ASSESSMENT — ACTIVITIES OF DAILY LIVING (ADL): HOME_HEALTH_OASIS: 00

## 2017-09-26 NOTE — PROGRESS NOTES
"Cancer Nurse Navigation:    Call placed to patient for follow up.  She reports she feels much better after home health nurse coming out.  Patient states she is feeling \"fine\" and doing \"better\".  Pt verbalized was anxious because not sure how things were going to be and what to do about dressing.  She states that is more comfortable now knowing she has someone (home health) checking on her for the \"next 2 weeks\".  Pt reported does have follow up with Dr Ganser on Oct 10th.  Pt denies other questions or needs from cancer nurse navigator.  Contact information provided.  "

## 2017-09-28 ENCOUNTER — HOME CARE VISIT (OUTPATIENT)
Dept: HOME HEALTH SERVICES | Facility: HOME HEALTHCARE | Age: 77
End: 2017-09-28
Payer: MEDICARE

## 2017-09-28 PROCEDURE — G0496 LPN CARE TRAIN/EDU IN HH: HCPCS

## 2017-09-29 ASSESSMENT — ACTIVITIES OF DAILY LIVING (ADL)
HOME_HEALTH_OASIS: 03
OASIS_M1830: 01

## 2017-09-30 ENCOUNTER — HOME CARE VISIT (OUTPATIENT)
Dept: HOME HEALTH SERVICES | Facility: HOME HEALTHCARE | Age: 77
End: 2017-09-30
Payer: MEDICARE

## 2017-09-30 PROCEDURE — G0162 HHC RN E&M PLAN SVS, 15 MIN: HCPCS

## 2017-10-01 VITALS
TEMPERATURE: 98.4 F | HEART RATE: 76 BPM | RESPIRATION RATE: 20 BRPM | DIASTOLIC BLOOD PRESSURE: 18 MMHG | SYSTOLIC BLOOD PRESSURE: 158 MMHG | OXYGEN SATURATION: 98 %

## 2017-10-01 SDOH — ECONOMIC STABILITY: HOUSING INSECURITY
HOME SAFETY: IN THE HOME. SMOKE ALARMS ARE PRESENT AND FUNCTIONAL ON EACH LEVEL OF THE HOME. PATIENT DOES HAVE A FIRE ESCAPE PLAN DEVELOPED. PATIENT DOES NOT HAVE FLAMMABLE MATERIALS PRESENT IN THE HOME PRESENTING A FIRE HAZARD. NO EVIDENCE FOUND OF SMOKING MATER

## 2017-10-01 SDOH — ECONOMIC STABILITY: HOUSING INSECURITY: UNSAFE APPLIANCES: 0

## 2017-10-01 SDOH — ECONOMIC STABILITY: HOUSING INSECURITY: UNSAFE COOKING RANGE AREA: 0

## 2017-10-01 SDOH — ECONOMIC STABILITY: HOUSING INSECURITY
HOME SAFETY: OXYGEN SAFETY RISK ASSESSMENT PERFORMED. PATIENT DOES NOT HAVE A NO SMOKING SIGN POSTED IN THE HOME., PT WAS GIVEN A NO SMOKING SIGN AND PROVIDED EDUCATION ABOUT WHY IT IS IMPORTANT TO PLACE ONE. PATIENT DOES HAVE A WORKING FIRE EXTINGUISHER PRESENT

## 2017-10-01 SDOH — ECONOMIC STABILITY: HOUSING INSECURITY: HOME SAFETY: IALS PRESENT IN THE HOME.

## 2017-10-02 ENCOUNTER — HOME CARE VISIT (OUTPATIENT)
Dept: HOME HEALTH SERVICES | Facility: HOME HEALTHCARE | Age: 77
End: 2017-10-02
Payer: MEDICARE

## 2017-10-02 VITALS
DIASTOLIC BLOOD PRESSURE: 68 MMHG | SYSTOLIC BLOOD PRESSURE: 136 MMHG | OXYGEN SATURATION: 95 % | TEMPERATURE: 97.8 F | HEART RATE: 65 BPM

## 2017-10-02 VITALS
WEIGHT: 141 LBS | TEMPERATURE: 99 F | BODY MASS INDEX: 24.2 KG/M2 | RESPIRATION RATE: 20 BRPM | OXYGEN SATURATION: 93 % | SYSTOLIC BLOOD PRESSURE: 130 MMHG | HEART RATE: 65 BPM | DIASTOLIC BLOOD PRESSURE: 76 MMHG

## 2017-10-02 PROCEDURE — G0494 LPN CARE EA 15MIN HH/HOSPICE: HCPCS

## 2017-10-02 SDOH — ECONOMIC STABILITY: HOUSING INSECURITY: UNSAFE COOKING RANGE AREA: 0

## 2017-10-02 SDOH — ECONOMIC STABILITY: HOUSING INSECURITY: UNSAFE APPLIANCES: 0

## 2017-10-02 ASSESSMENT — ENCOUNTER SYMPTOMS: RESPIRATORY SYMPTOMS COMMENTS: PT LUNGS CLEAR IN ALL FIELDS, NO ADVANTAGEOUS SOUND NOTED.

## 2017-10-04 ENCOUNTER — HOME CARE VISIT (OUTPATIENT)
Dept: HOME HEALTH SERVICES | Facility: HOME HEALTHCARE | Age: 77
End: 2017-10-04
Payer: MEDICARE

## 2017-10-04 VITALS
OXYGEN SATURATION: 98 % | TEMPERATURE: 98.5 F | DIASTOLIC BLOOD PRESSURE: 70 MMHG | SYSTOLIC BLOOD PRESSURE: 140 MMHG | HEART RATE: 68 BPM | RESPIRATION RATE: 18 BRPM

## 2017-10-04 PROCEDURE — G0299 HHS/HOSPICE OF RN EA 15 MIN: HCPCS

## 2017-10-04 SDOH — ECONOMIC STABILITY: HOUSING INSECURITY
HOME SAFETY: FIRE ESCAPE PLAN DEVELOPED. PATIENT DOES NOT HAVE FLAMMABLE MATERIALS PRESENT IN THE HOME PRESENTING A FIRE HAZARD. NO EVIDENCE FOUND OF SMOKING MATERIALS PRESENT IN THE HOME.

## 2017-10-04 SDOH — ECONOMIC STABILITY: HOUSING INSECURITY: UNSAFE APPLIANCES: 0

## 2017-10-04 SDOH — ECONOMIC STABILITY: HOUSING INSECURITY
HOME SAFETY: OXYGEN SAFETY RISK ASSESSMENT PERFORMED. PATIENT DOES HAVE A NO SMOKING SIGN POSTED IN THE HOME. PATIENT DOES HAVE A WORKING FIRE EXTINGUISHER PRESENT IN THE HOME. SMOKE ALARMS ARE PRESENT AND FUNCTIONAL ON EACH LEVEL OF THE HOME. PATIENT DOES HAVE A

## 2017-10-04 SDOH — ECONOMIC STABILITY: HOUSING INSECURITY: UNSAFE COOKING RANGE AREA: 0

## 2017-10-04 ASSESSMENT — ENCOUNTER SYMPTOMS
NAUSEA: DENIES
VOMITING: DENIES

## 2017-10-06 ENCOUNTER — HOME CARE VISIT (OUTPATIENT)
Dept: HOME HEALTH SERVICES | Facility: HOME HEALTHCARE | Age: 77
End: 2017-10-06
Payer: MEDICARE

## 2017-10-06 PROCEDURE — G0495 RN CARE TRAIN/EDU IN HH: HCPCS

## 2017-10-07 VITALS
HEART RATE: 58 BPM | SYSTOLIC BLOOD PRESSURE: 140 MMHG | OXYGEN SATURATION: 98 % | RESPIRATION RATE: 18 BRPM | TEMPERATURE: 98.1 F | DIASTOLIC BLOOD PRESSURE: 78 MMHG

## 2017-10-07 SDOH — ECONOMIC STABILITY: HOUSING INSECURITY: UNSAFE COOKING RANGE AREA: 0

## 2017-10-07 SDOH — ECONOMIC STABILITY: HOUSING INSECURITY: UNSAFE APPLIANCES: 0

## 2017-10-09 ENCOUNTER — HOME CARE VISIT (OUTPATIENT)
Dept: HOME HEALTH SERVICES | Facility: HOME HEALTHCARE | Age: 77
End: 2017-10-09
Payer: MEDICARE

## 2017-10-09 VITALS
TEMPERATURE: 99.1 F | HEART RATE: 59 BPM | DIASTOLIC BLOOD PRESSURE: 80 MMHG | RESPIRATION RATE: 18 BRPM | OXYGEN SATURATION: 94 % | SYSTOLIC BLOOD PRESSURE: 138 MMHG

## 2017-10-09 PROCEDURE — G0494 LPN CARE EA 15MIN HH/HOSPICE: HCPCS

## 2017-10-09 ASSESSMENT — ENCOUNTER SYMPTOMS: DEBILITATING PAIN: 1

## 2017-10-10 PROCEDURE — G0180 MD CERTIFICATION HHA PATIENT: HCPCS | Performed by: INTERNAL MEDICINE

## 2017-10-11 ENCOUNTER — HOME CARE VISIT (OUTPATIENT)
Dept: HOME HEALTH SERVICES | Facility: HOME HEALTHCARE | Age: 77
End: 2017-10-11
Payer: MEDICARE

## 2017-10-12 ENCOUNTER — PATIENT OUTREACH (OUTPATIENT)
Dept: OTHER | Facility: MEDICAL CENTER | Age: 77
End: 2017-10-12

## 2017-10-12 NOTE — PROGRESS NOTES
"Call placed to patient for follow up.  Pt reports follow up with Dr Ganser went well and he said she could do \"whatever she wanted\".  Pt saying home health was invaluable to her.  Pt stating Dr Ganser wants to follow up in 6 months with CAT scan and has completed cancer treatment.  Will create treatment summary and survivorship care plan for patient. She states she would prefer to have it mailed to her.  Pt denies questions or needs at this time.  "

## 2017-10-13 ENCOUNTER — HOME CARE VISIT (OUTPATIENT)
Dept: HOME HEALTH SERVICES | Facility: HOME HEALTHCARE | Age: 77
End: 2017-10-13
Payer: MEDICARE

## 2017-10-16 NOTE — DISCHARGE SUMMARY
DATE OF ADMISSION:  09/18/2017    DATE OF DISCHARGE:  09/22/2017    ADMITTING DIAGNOSIS:  Right lower lobe lung cancer.    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    OPERATION PERFORMED:  Thoracoscopic right lower lobectomy, lymph node   dissection, performed by Dr. Ganser on 09/18/2017.    INDICATIONS:  The patient is a 76-year-old female who had a chest x-ray for   cough performed several months prior to this admission, demonstrated a   possible pneumonia.  Chest CT scan was ultimately done showing a 2.2x3.8 cm   spiculated mass in the right lower lobe and PET scan showed uptake.  She had   uptake in a neck node, but needle aspiration was negative.  Biopsy of the lung   mass confirms invasive adenocarcinoma.  Lung function is adequate.    Therefore, after an involved preoperative education and informed consent   process, the patient was brought to the operating room for the aforementioned   procedure.    HOSPITAL COURSE:  After the procedure, the patient went to the general   surgical unit in stable condition.  The patient had an air leak in her chest   tube noted immediately postop, which led to subcutaneous emphysema around the   chest tube site and along her thorax.  On postoperative day #4, the air leak   had resolved and the subQ emphysema had markedly improved.  Postoperative   chest x-rays demonstrated no significant pneumothorax.  Therefore, the chest   tube was removed and a sterile Tegaderm bandage was placed.  At the time of   discharge, the patient's vital signs were stable and she was afebrile.  She   was ambulatory and tolerating p.o. very well.  Her wounds were clear.    DISPOSITION:  The patient will be discharged home.    DISCHARGE INSTRUCTIONS:  She is instructed to follow up with Dr. Ganser in the   next 1-2 weeks.  She is counseled extensively regarding diet, activity, wound   care, and home medications.  The patient was noted to be  somewhat hypoxic and   a referral for home health O2 set up was made as well as a referral for home   health services.  The patient was slightly generally deconditioned and needed   that assistance.    DISCHARGE MEDICATIONS:  Hydrocodone-acetaminophen 5/325 mg, dispensed quantity   30, directions are 1-2 p.o. q. 4 hours p.r.n. pain.  The patient will also   resume her normal simvastatin 40 mg p.o. daily, vitamin D 1000 IUs p.o. twice   daily and her calcium carbonate 500 mg p.o. twice daily.       ____________________________________     WILLY CAREY / TOBIAS    DD:  10/16/2017 10:23:18  DT:  10/16/2017 10:55:50    D#:  [id number]  Job#:  [id number]    cc: Corinne JEFFRIES, AMY ELY MD

## 2017-10-17 ENCOUNTER — HOME CARE VISIT (OUTPATIENT)
Dept: HOME HEALTH SERVICES | Facility: HOME HEALTHCARE | Age: 77
End: 2017-10-17
Payer: MEDICARE

## 2017-10-17 ENCOUNTER — PATIENT OUTREACH (OUTPATIENT)
Dept: OTHER | Facility: MEDICAL CENTER | Age: 77
End: 2017-10-17

## 2017-10-17 ASSESSMENT — ACTIVITIES OF DAILY LIVING (ADL)
HOME_HEALTH_OASIS: 00
OASIS_M1830: 01

## 2017-10-19 ENCOUNTER — HOME CARE VISIT (OUTPATIENT)
Dept: HOME HEALTH SERVICES | Facility: HOME HEALTHCARE | Age: 77
End: 2017-10-19
Payer: MEDICARE

## 2017-10-23 ENCOUNTER — PATIENT OUTREACH (OUTPATIENT)
Dept: OTHER | Facility: MEDICAL CENTER | Age: 77
End: 2017-10-23

## 2017-10-23 NOTE — PROGRESS NOTES
"Call placed to patient to review treatment summary and survivorship care plan.  Pt reports she received it and does not have questions.  Pt is very happy that she is \"cured\" and doesn't have next follow up for 6 months.  Pt does still see primary care physician.  Pt asking about how you normally would catch early stage lung cancer.  Explained to her about low dose lung cancer screening.  Talked to patient about continued smoking cessation. She reports has not smoked since discharge and is very proud of herself.  Provided encouragement and importance of continuing current ways that are helping her stay smoke free.  Pt denies other issues or concerns. Care summary scanned into EPIC.   Pt has completed cancer nurse navigation.    "

## 2017-10-24 ENCOUNTER — PATIENT OUTREACH (OUTPATIENT)
Dept: HEALTH INFORMATION MANAGEMENT | Facility: OTHER | Age: 77
End: 2017-10-24

## 2017-10-24 DIAGNOSIS — I50.9 CONGESTIVE HEART FAILURE, UNSPECIFIED CONGESTIVE HEART FAILURE CHRONICITY, UNSPECIFIED CONGESTIVE HEART FAILURE TYPE: ICD-10-CM

## 2017-10-30 ENCOUNTER — PATIENT OUTREACH (OUTPATIENT)
Dept: HEALTH INFORMATION MANAGEMENT | Facility: OTHER | Age: 77
End: 2017-10-30

## 2017-10-31 NOTE — PROGRESS NOTES
Patient discharged on 9/22/2017. [facility] patient advocate assisted with discharge needs including 1 appointments, 1 Surgeon appointment. Of the 1 appointment the patient kept 1. Patient also has 1 future appointment with Primary care physician on 11/1/17. Patient advocate also confirmed that [facility] rendered services to patient on 9/26 and [facility] rendered oxygen services on 9/22/17.

## 2017-11-01 ENCOUNTER — OFFICE VISIT (OUTPATIENT)
Dept: MEDICAL GROUP | Facility: MEDICAL CENTER | Age: 77
End: 2017-11-01
Payer: MEDICARE

## 2017-11-01 VITALS
HEIGHT: 64 IN | HEART RATE: 83 BPM | SYSTOLIC BLOOD PRESSURE: 120 MMHG | OXYGEN SATURATION: 95 % | BODY MASS INDEX: 24.59 KG/M2 | DIASTOLIC BLOOD PRESSURE: 78 MMHG | RESPIRATION RATE: 16 BRPM | TEMPERATURE: 98.6 F | WEIGHT: 144 LBS

## 2017-11-01 DIAGNOSIS — C34.31 MALIGNANT NEOPLASM OF LOWER LOBE OF RIGHT LUNG (HCC): ICD-10-CM

## 2017-11-01 DIAGNOSIS — E78.5 DYSLIPIDEMIA: ICD-10-CM

## 2017-11-01 DIAGNOSIS — J44.9 CHRONIC OBSTRUCTIVE PULMONARY DISEASE, UNSPECIFIED COPD TYPE (HCC): ICD-10-CM

## 2017-11-01 PROBLEM — R91.8 LUNG MASS: Status: RESOLVED | Noted: 2017-06-22 | Resolved: 2017-11-01

## 2017-11-01 PROBLEM — R91.1 SOLITARY PULMONARY NODULE: Status: RESOLVED | Noted: 2017-07-24 | Resolved: 2017-11-01

## 2017-11-01 PROCEDURE — 99214 OFFICE O/P EST MOD 30 MIN: CPT | Performed by: INTERNAL MEDICINE

## 2017-11-01 NOTE — PROGRESS NOTES
"CC: Follow-up multiple issues    HPI:   Cheli presents today with the following.    1. Malignant neoplasm of lower lobe of right lung (CMS-HCC)  Presents status post surgery with no signs of spread. She does have follow-up with her surgeon in 6 months for repeat CT. She denies any major issues other than some slight discomfort in her right lower chest and no difficulty breathing.    2. Dyslipidemia  Maintain on statin without myalgias denying any chest pain.    3. Chronic obstructive pulmonary disease, unspecified COPD type (CMS-HCC)  She was diagnosed with COPD however does not report any major breathing difficulty and has quit smoking. She denies any cough or shortness of breath.      Patient Active Problem List    Diagnosis Date Noted   • Malignant neoplasm of lower lobe of lung (CMS-HCC) 09/18/2017   • Chronic obstructive pulmonary disease (CMS-HCC) 05/01/2017   • Vitamin D deficiency 12/23/2015   • IGT (impaired glucose tolerance) 09/03/2015   • Osteopenia 05/28/2014   • CATARACT    • Dyslipidemia        Current Outpatient Prescriptions   Medication Sig Dispense Refill   • non-formulary med Inhale 1 L/min by mouth as needed (shortness of breath).     • hydrocodone-acetaminophen (NORCO) 5-325 MG Tab per tablet Take 1-2 Tabs by mouth every four hours as needed (pain). 30 Tab 0   • calcium carbonate (CALCIUM CARBONATE) 500 MG Tab Take 500 mg by mouth 2 times a day, with meals.     • simvastatin (ZOCOR) 40 MG Tab TAKE 1 TABLET BY MOUTH ONCE DAILY 90 Tab 3   • vitamin D (CHOLECALCIFEROL) 1000 UNIT Tab Take 1,000 Units by mouth 2 Times a Day.       No current facility-administered medications for this visit.          Allergies as of 11/01/2017 - Reviewed 11/01/2017   Allergen Reaction Noted   • Nkda [no known drug allergy]  09/22/2009        ROS: As per HPI.    /78   Pulse 83   Temp 37 °C (98.6 °F)   Resp 16   Ht 1.626 m (5' 4\")   Wt 65.3 kg (144 lb)   SpO2 95%   BMI 24.72 kg/m²     Physical Exam:  Gen:   "       Alert and oriented, No apparent distress.  Neck:        No Lymphadenopathy or Bruits.  Lungs:     Clear to auscultation bilaterally  CV:          Regular rate and rhythm. No murmurs, rubs or gallops.               Ext:          No clubbing, cyanosis, edema.      Assessment and Plan.   76 y.o. female with the following issues.    1. Malignant neoplasm of lower lobe of right lung (CMS-HCC)  Clinically doing well she will get a repeat CT in 6 months.    2. Dyslipidemia  Continue statin.    3. Chronic obstructive pulmonary disease, unspecified COPD type (CMS-HCC)  Encouraged her decision to quit smoking recommended vaccinations which she declines.

## 2017-11-21 ENCOUNTER — PATIENT OUTREACH (OUTPATIENT)
Dept: HEALTH INFORMATION MANAGEMENT | Facility: OTHER | Age: 77
End: 2017-11-21

## 2017-11-21 NOTE — PROGRESS NOTES
Attempt #:1    WebIZ Checked & Epic Updated: Yes  · WebIZ Recommendations: FLU and PREVNAR (PCV13)   · Is patient due for Tdap? NO  · Is patient due for Shingles? NO  [facility] Verified: yes  Verify PCP: yes    Communication Preference Obtained: yes     Review Care Team: yes    Annual Wellness Visit Scheduling  1. Scheduling Status:Not Scheduled. Patient states they are not interested     Care Gap Scheduling (Attempt to Schedule EACH Overdue Care Gap!)     Health Maintenance Due   Topic Date Due   • IMM PNEUMOCOCCAL 65+ (ADULT) LOW/MEDIUM RISK SERIES (1 of 2 - PCV13) 11/19/2004   • IMM INFLUENZA (1) 09/01/2017        Patient declined Immunizations: FLU and PREVNAR (PCV13) .       [employer] Activation: declined  [employer] Tracee: no  Virtual Visits: no  Opt In to Text Messages: no

## 2018-04-16 NOTE — PROGRESS NOTES
Outcome:NO ANSWER    Please transfer to Patient Outreach Team at [phone] when patient returns call.    Attempt # 2

## 2018-05-12 RX ORDER — SIMVASTATIN 40 MG
TABLET ORAL
Refills: 3 | Status: CANCELLED | OUTPATIENT
Start: 2018-05-12

## 2018-05-14 ENCOUNTER — HOSPITAL ENCOUNTER (OUTPATIENT)
Dept: LAB | Facility: MEDICAL CENTER | Age: 78
End: 2018-05-14
Attending: SURGERY
Payer: MEDICARE

## 2018-05-14 ENCOUNTER — HOSPITAL ENCOUNTER (OUTPATIENT)
Facility: MEDICAL CENTER | Age: 78
End: 2018-05-14
Attending: SURGERY
Payer: MEDICARE

## 2018-05-14 LAB
BUN SERPL-MCNC: 16 MG/DL (ref 8–22)
CK SERPL-CCNC: 28 U/L (ref 0–154)

## 2018-05-14 PROCEDURE — 82550 ASSAY OF CK (CPK): CPT

## 2018-05-14 PROCEDURE — 84520 ASSAY OF UREA NITROGEN: CPT

## 2018-05-14 PROCEDURE — 82565 ASSAY OF CREATININE: CPT

## 2018-05-14 PROCEDURE — 36415 COLL VENOUS BLD VENIPUNCTURE: CPT

## 2018-05-14 RX ORDER — SIMVASTATIN 40 MG
TABLET ORAL
Qty: 90 TAB | Refills: 1 | Status: SHIPPED | OUTPATIENT
Start: 2018-05-14 | End: 2019-01-10 | Stop reason: SDUPTHER

## 2018-05-16 LAB — CREAT SERPL-MCNC: 0.71 MG/DL (ref 0.5–1.4)

## 2018-05-17 ENCOUNTER — PATIENT OUTREACH (OUTPATIENT)
Dept: HEALTH INFORMATION MANAGEMENT | Facility: OTHER | Age: 78
End: 2018-05-17

## 2018-05-17 ENCOUNTER — HOSPITAL ENCOUNTER (OUTPATIENT)
Dept: RADIOLOGY | Facility: MEDICAL CENTER | Age: 78
End: 2018-05-17
Attending: SURGERY
Payer: MEDICARE

## 2018-05-17 DIAGNOSIS — C34.30 MALIGNANT NEOPLASM OF LOWER LOBE OF LUNG, UNSPECIFIED LATERALITY (HCC): ICD-10-CM

## 2018-05-17 PROCEDURE — 700117 HCHG RX CONTRAST REV CODE 255: Performed by: SURGERY

## 2018-05-17 PROCEDURE — 71260 CT THORAX DX C+: CPT

## 2018-05-17 RX ADMIN — IOHEXOL 75 ML: 350 INJECTION, SOLUTION INTRAVENOUS at 11:06

## 2018-05-17 NOTE — PROGRESS NOTES
Attempt #:1    WebIZ Checked & Epic Updated: yes  HealthConnect Verified: yes  Verify PCP: yes    Communication Preference Obtained: yes     Review Care Team: yes    Annual Wellness Visit Scheduling  1. Scheduling Status:Not Scheduled. Patient states they are not interested     Care Gap Scheduling (Attempt to Schedule EACH Overdue Care Gap!)  Health Maintenance Due   Topic Date Due   • IMM PNEUMOCOCCAL 65+ (ADULT) LOW/MEDIUM RISK SERIES (1 of 2 - PCV13) 11/19/2004   • MAMMOGRAM  01/18/2018   • Annual Wellness Visit  05/02/2018           [employer] Activation:DECLINED   [employer] Tracee: no  Virtual Visits: no  Opt In to Text Messages: no

## 2018-10-10 ENCOUNTER — PATIENT OUTREACH (OUTPATIENT)
Dept: HEALTH INFORMATION MANAGEMENT | Facility: OTHER | Age: 78
End: 2018-10-10

## 2018-10-10 NOTE — PROGRESS NOTES
1. Attempt #:1    2. HealthConnect Verified: yes    3. Verify PCP: yes    4. Review Care Team: yes    5. WebIZ Checked & Epic Updated: Yes      6. Reviewed/Updated the following with patient:       •   Communication Preference Obtained? YES       •   Preferred Pharmacy? YES       •   Preferred Lab? YES       •   Family History (document living status of immediate family members and if + hx of cancer, diabetes, hypertension, hyperlipidemia, heart attack, stroke) YES. Was Abstract Encounter opened and chart updated? YES    7. Annual Wellness Visit Scheduling  · Scheduling Status:Scheduled        9. Care Gap Scheduling (Attempt to Schedule EACH Overdue Care Gap!)     Health Maintenance Due   Topic Date Due   • IMM ZOSTER VACCINES (1 of 2) 11/19/1989   • IMM PNEUMOCOCCAL 65+ (ADULT) LOW/MEDIUM RISK SERIES (1 of 2 - PCV13) 11/19/2004   • MAMMOGRAM  01/18/2018   • Annual Wellness Visit  05/02/2018   • PFT SCREENING-FEV1 AND FEV/FVC RATIO / SPIROMETRY SHOULD BE PERFORMED ANNUALLY  05/30/2018   • IMM INFLUENZA (1) 09/01/2018        Scheduled patient for Annual Wellness Visit/ STATES SHE NO LONGER COMPLETES MAMMOGRAMS  DECLINED IMMUNIZATIONS     10. [employer] Activation: declined    11. [employer] Tracee: no    12. Virtual Visits: no    13. Opt In to Text Messages: no    14. Patient was advised: “This is a free wellness visit. The provider will screen for medical conditions to help you stay healthy. If you have other concerns to address you may be asked to discuss these at a separate visit or there may be an additional fee.”     15. Patient was informed to arrive 15 min prior to their scheduled appointment and bring in their medication bottles.

## 2018-10-19 ENCOUNTER — OFFICE VISIT (OUTPATIENT)
Dept: MEDICAL GROUP | Facility: MEDICAL CENTER | Age: 78
End: 2018-10-19
Payer: MEDICARE

## 2018-10-19 VITALS
OXYGEN SATURATION: 94 % | WEIGHT: 149.91 LBS | HEART RATE: 78 BPM | HEIGHT: 64 IN | SYSTOLIC BLOOD PRESSURE: 130 MMHG | DIASTOLIC BLOOD PRESSURE: 88 MMHG | TEMPERATURE: 97.4 F | BODY MASS INDEX: 25.59 KG/M2

## 2018-10-19 DIAGNOSIS — Z85.118 HISTORY OF LUNG CANCER: ICD-10-CM

## 2018-10-19 DIAGNOSIS — J44.9 CHRONIC OBSTRUCTIVE PULMONARY DISEASE, UNSPECIFIED COPD TYPE (HCC): ICD-10-CM

## 2018-10-19 DIAGNOSIS — Z00.00 MEDICARE ANNUAL WELLNESS VISIT, SUBSEQUENT: ICD-10-CM

## 2018-10-19 DIAGNOSIS — E55.9 VITAMIN D DEFICIENCY: ICD-10-CM

## 2018-10-19 DIAGNOSIS — E78.5 DYSLIPIDEMIA: ICD-10-CM

## 2018-10-19 PROCEDURE — G0439 PPPS, SUBSEQ VISIT: HCPCS | Performed by: INTERNAL MEDICINE

## 2018-10-19 ASSESSMENT — ACTIVITIES OF DAILY LIVING (ADL): BATHING_REQUIRES_ASSISTANCE: 0

## 2018-10-19 ASSESSMENT — ENCOUNTER SYMPTOMS: GENERAL WELL-BEING: GOOD

## 2018-10-19 ASSESSMENT — PATIENT HEALTH QUESTIONNAIRE - PHQ9: CLINICAL INTERPRETATION OF PHQ2 SCORE: 0

## 2018-10-19 NOTE — PROGRESS NOTES
CC: Wellness evaluation    HPI:   Cheli presents today with the following.      1. Medicare annual wellness visit, subsequent  Presents for wellness examination no particular complaints.  She adamantly refuses all vaccinations.  Advanced directives are already on line.      Information for advance directives given to patient or instructed to bring in advance directives into to office to put in chart.      Depression Screening    Little interest or pleasure in doing things?  0 - not at all  Feeling down, depressed , or hopeless? 0 - not at all  Patient Health Questionnaire Score: 0     If depressive symptoms identified deferred to follow up visit unless specifically addressed in assessment and plan.    Interpretation of PHQ-9 Total Score   Score Severity   1-4 No Depression   5-9 Mild Depression   10-14 Moderate Depression   15-19 Moderately Severe Depression   20-27 Severe Depression    Screening for Cognitive Impairment    Three Minute Recall (leader, season, table) 2/3    David clock face with all 12 numbers and set the hands to show 10 past 11.  Yes 2/2  Cognitive concerns identified deferred for follow up unless specifically addressed in assessment and plan.    Fall Risk Assessment    Has the patient had two or more falls in the last year or any fall with injury in the last year?  No    Safety Assessment    Throw rugs on floor.  No  Handrails on all stairs.  Yes  Good lighting in all hallways.  Yes  Difficulty hearing.  No  Patient counseled about all safety risks that were identified.    Functional Assessment ADLs    Are there any barriers preventing you from cooking for yourself or meeting nutritional needs?  No.    Are there any barriers preventing you from driving safely or obtaining transportation?  No.    Are there any barriers preventing you from using a telephone or calling for help?  No.    Are there any barriers preventing you from shopping?  No.    Are there any barriers preventing you from taking care  of your own finances?  No.    Are there any barriers preventing you from managing your medications?  No.    Are there any barriers preventing you from showering, bathing or dressing yourself?  No.    Are you currently engaging in any exercise or physical activity?  No.     What is your perception of your health?  Good.      Health Maintenance Summary                IMM PNEUMOCOCCAL 65+ (ADULT) LOW/MEDIUM RISK SERIES Overdue 11/19/2004     Annual Wellness Visit Overdue 5/2/2018      Done 5/1/2017 Visit Dx: Medicare annual wellness visit, subsequent    PFT SCREENING-FEV1 AND FEV/FVC RATIO / SPIROMETRY SHOULD BE PERFORMED ANNUALLY Overdue 5/30/2018      Done 5/30/2017 PFT DICTATED RESULTS    IMM INFLUENZA Overdue 9/1/2018     IMM ZOSTER VACCINES Postponed 10/11/2034 Originally 11/19/1989. Insurance/Financial    BONE DENSITY Next Due 12/19/2021      Done 12/19/2016 DS-BONE DENSITY STUDY (DEXA)     Patient has more history with this topic...    COLONOSCOPY Next Due 2/26/2023      Patient Declined 2/26/2013           Patient Care Team:  Danny Buchanan M.D. as PCP - General (Internal Medicine)  [facility] as Home Health Provider  Dejon Cadena M.D. as Consulting Physician (Ophthalmology)            Health Care Screening: Age-appropriate preventive services that Medicare covers were discussed today and ordered as indicated and agreed upon by the patient, and as recommended by USPTF and ACIP.     Patient Active Problem List    Diagnosis Date Noted   • History of lung cancer 09/18/2017   • Chronic obstructive pulmonary disease (HCC) 05/01/2017   • Vitamin D deficiency 12/23/2015   • Osteopenia 05/28/2014   • CATARACT    • Dyslipidemia        Current Outpatient Prescriptions   Medication Sig Dispense Refill   • simvastatin (ZOCOR) 40 MG Tab TAKE 1 TABLET BY MOUTH ONCE DAILY 90 Tab 1   • non-formulary med Inhale 1 L/min by mouth as needed (shortness of breath).     • calcium carbonate (CALCIUM CARBONATE) 500 MG Tab  Take 500 mg by mouth 2 times a day, with meals.     • vitamin D (CHOLECALCIFEROL) 1000 UNIT Tab Take 1,000 Units by mouth 2 Times a Day.       No current facility-administered medications for this visit.        Family History   Problem Relation Age of Onset   • Lung Disease Father         emphazema/Smoker   • Heart Attack Father    • Arthritis Mother    • Lung Disease Sister         COPD/Smoker   • Diabetes Sister    • No Known Problems Brother    • No Known Problems Brother    • No Known Problems Son    • No Known Problems Maternal Grandmother    • Cancer Maternal Grandfather         throat cancer/ Pipe smoker   • Dementia Paternal Grandmother    • Cancer Paternal Grandfather    • Breast Cancer Paternal Aunt        Social History     Social History   • Marital status:      Spouse name: N/A   • Number of children: 1   • Years of education: N/A     Occupational History   • Not on file.     Social History Main Topics   • Smoking status: Current Every Day Smoker     Packs/day: 1.00     Years: 63.00     Types: Cigarettes   • Smokeless tobacco: Never Used      Comment: started at 15   • Alcohol use No   • Drug use: No   • Sexual activity: No     Other Topics Concern   • Not on file     Social History Narrative   • No narrative on file       Past Surgical History:   Procedure Laterality Date   • THORACOSCOPY Right 9/18/2017    Procedure: THORACOSCOPY- LOBECTOMY LOWER;  Surgeon: John H Ganser, M.D.;  Location: SURGERY [facility];  Service: General   • NODE DISSECTION Right 9/18/2017    Procedure: NODE DISSECTION;  Surgeon: John H Ganser, M.D.;  Location: SURGERY [facility];  Service: General   • ABDOMINAL HYSTERECTOMY TOTAL     • CATARACT EXTRACTION WITH IOL Bilateral    • OTHER      rita cataracts       Allergies as of 10/19/2018 - Reviewed 10/19/2018   Allergen Reaction Noted   • Nkda [no known drug allergy]  09/22/2009        ROS: Denies Chest pain, SOB, LE edema.    /88 (BP Location: Left arm,  "Patient Position: Sitting, BP Cuff Size: Adult)   Pulse 78   Temp 36.3 °C (97.4 °F) (Temporal)   Ht 1.626 m (5' 4\")   Wt 68 kg (149 lb 14.6 oz)   SpO2 94%   BMI 25.73 kg/m²     Physical Exam:  Gen:         Alert and oriented, No apparent distress.  Neck:        No Lymphadenopathy or Bruits.  Lungs:     Clear to auscultation bilaterally  CV:          Regular rate and rhythm. No murmurs, rubs or gallops.  Abd:         Soft non tender, non distended. Normal active bowel sounds.  No  Hepatosplenomegaly, No pulsatile masses.                   Ext:          No clubbing, cyanosis, edema.      Assessment and Plan.   77 y.o. female with the following issues.    1. Medicare annual wellness visit, subsequent  Discussed healthy lifestyle habits as well as screening regimens.  - Annual Wellness Visit - Includes PPPS Subsequent ()    2. Dyslipidemia  Rechecking cholesterol  - Annual Wellness Visit - Includes PPPS Subsequent ()  - COMP METABOLIC PANEL; Future  - LIPID PROFILE; Future    3. Chronic obstructive pulmonary disease, unspecified COPD type (HCC)  Stable no change of therapy  - Annual Wellness Visit - Includes PPPS Subsequent ()    4. History of lung cancer  No signs of recurrence  - Annual Wellness Visit - Includes PPPS Subsequent ()    Referrals offered: Community-based lifestyle interventions to reduce health risks and promote self-management and wellness, fall prevention, nutrition, physical activity, tobacco-use cessation, weight loss, and mental health services as per orders if indicated.    Discussion today about general wellness and lifestyle habits:    · Prevent falls and reduce trip hazards; Cautioned about securing or removing rugs.  · Have a working fire alarm and carbon monoxide detector;   · Engage in regular physical activity and social activities         "

## 2018-10-23 ENCOUNTER — HOSPITAL ENCOUNTER (OUTPATIENT)
Dept: LAB | Facility: MEDICAL CENTER | Age: 78
End: 2018-10-23
Attending: INTERNAL MEDICINE
Payer: MEDICARE

## 2018-10-23 DIAGNOSIS — E78.5 DYSLIPIDEMIA: ICD-10-CM

## 2018-10-23 LAB
ALBUMIN SERPL BCP-MCNC: 4.5 G/DL (ref 3.2–4.9)
ALBUMIN/GLOB SERPL: 1.6 G/DL
ALP SERPL-CCNC: 74 U/L (ref 30–99)
ALT SERPL-CCNC: 12 U/L (ref 2–50)
ANION GAP SERPL CALC-SCNC: 10 MMOL/L (ref 0–11.9)
AST SERPL-CCNC: 14 U/L (ref 12–45)
BILIRUB SERPL-MCNC: 0.9 MG/DL (ref 0.1–1.5)
BUN SERPL-MCNC: 21 MG/DL (ref 8–22)
CALCIUM SERPL-MCNC: 9.9 MG/DL (ref 8.5–10.5)
CHLORIDE SERPL-SCNC: 108 MMOL/L (ref 96–112)
CHOLEST SERPL-MCNC: 147 MG/DL (ref 100–199)
CO2 SERPL-SCNC: 26 MMOL/L (ref 20–33)
CREAT SERPL-MCNC: 0.79 MG/DL (ref 0.5–1.4)
FASTING STATUS PATIENT QL REPORTED: NORMAL
GLOBULIN SER CALC-MCNC: 2.9 G/DL (ref 1.9–3.5)
GLUCOSE SERPL-MCNC: 101 MG/DL (ref 65–99)
HDLC SERPL-MCNC: 46 MG/DL
LDLC SERPL CALC-MCNC: 75 MG/DL
POTASSIUM SERPL-SCNC: 3.8 MMOL/L (ref 3.6–5.5)
PROT SERPL-MCNC: 7.4 G/DL (ref 6–8.2)
SODIUM SERPL-SCNC: 144 MMOL/L (ref 135–145)
TRIGL SERPL-MCNC: 131 MG/DL (ref 0–149)

## 2018-10-23 PROCEDURE — 80053 COMPREHEN METABOLIC PANEL: CPT

## 2018-10-23 PROCEDURE — 36415 COLL VENOUS BLD VENIPUNCTURE: CPT

## 2018-10-23 PROCEDURE — 80061 LIPID PANEL: CPT

## 2019-01-10 RX ORDER — SIMVASTATIN 40 MG
TABLET ORAL
Qty: 90 TAB | Refills: 2 | Status: SHIPPED | OUTPATIENT
Start: 2019-01-10 | End: 2019-10-07 | Stop reason: SDUPTHER

## 2019-08-28 ENCOUNTER — PATIENT OUTREACH (OUTPATIENT)
Dept: HEALTH INFORMATION MANAGEMENT | Facility: OTHER | Age: 79
End: 2019-08-28

## 2019-08-28 NOTE — PROGRESS NOTES
Outcome: Left Message    Please transfer to Patient Outreach Team at [phone] when patient returns call.    HealthConnect Verified: yes    Attempt # 1

## 2019-09-03 ENCOUNTER — TELEPHONE (OUTPATIENT)
Dept: HEALTH INFORMATION MANAGEMENT | Facility: OTHER | Age: 79
End: 2019-09-03

## 2019-09-03 DIAGNOSIS — Z13.6 SCREENING FOR CARDIOVASCULAR CONDITION: ICD-10-CM

## 2019-09-03 DIAGNOSIS — R73.02 IGT (IMPAIRED GLUCOSE TOLERANCE): ICD-10-CM

## 2019-09-03 SDOH — ECONOMIC STABILITY: FOOD INSECURITY: WITHIN THE PAST 12 MONTHS, YOU WORRIED THAT YOUR FOOD WOULD RUN OUT BEFORE YOU GOT MONEY TO BUY MORE.: PATIENT DECLINED

## 2019-09-03 SDOH — ECONOMIC STABILITY: TRANSPORTATION INSECURITY
IN THE PAST 12 MONTHS, HAS LACK OF TRANSPORTATION KEPT YOU FROM MEETINGS, WORK, OR FROM GETTING THINGS NEEDED FOR DAILY LIVING?: NO

## 2019-09-03 SDOH — ECONOMIC STABILITY: FOOD INSECURITY: WITHIN THE PAST 12 MONTHS, THE FOOD YOU BOUGHT JUST DIDN'T LAST AND YOU DIDN'T HAVE MONEY TO GET MORE.: PATIENT DECLINED

## 2019-09-03 SDOH — ECONOMIC STABILITY: TRANSPORTATION INSECURITY
IN THE PAST 12 MONTHS, HAS THE LACK OF TRANSPORTATION KEPT YOU FROM MEDICAL APPOINTMENTS OR FROM GETTING MEDICATIONS?: NO

## 2019-09-03 NOTE — PROGRESS NOTES
1. Attempt #:1    2. HealthConnect Verified: yes    3. Verify PCP: yes    4. Review Care Team: yes    5. WebIZ Checked & Epic Updated: NA  · Is patient due for Tdap? NO  · Is patient due for Shingles? NO    6. Reviewed/Updated the following with patient:       •   Communication Preference Obtained? NO       •   Preferred Pharmacy? YES       •   Preferred Lab? YES       •   Family History (document living status of immediate family members and if + hx of cancer, diabetes, hypertension, hyperlipidemia, heart attack, stroke) YES    7. Annual Wellness Visit Scheduling  · Scheduling Status:Scheduled     8. Care Gap Scheduling (Attempt to Schedule EACH Overdue Care Gap!)     Health Maintenance Due   Topic Date Due   • PFT SCREENING-FEV1 AND FEV/FVC RATIO / SPIROMETRY SHOULD BE PERFORMED ANNUALLY  11/19/1957   • IMM PNEUMOCOCCAL VACCINE: 65+ Years (1 of 2 - PCV13) 11/19/2004   • IMM INFLUENZA (1) 09/01/2019        Scheduled patient for Annual Wellness Visit     9. [employer] Advance Activation: declined    10. [employer] Advance Tracee: no    11. Virtual Visits: no    12. Opt In to Text Messages: no    13. Patient was advised: “This is a free wellness visit. The provider will screen for medical conditions to help you stay healthy. If you have other concerns to address you may be asked to discuss these at a separate visit or there may be an additional fee.”     14. Patient was informed to arrive 15 min prior to their scheduled appointment and bring in their medication bottles.

## 2019-09-03 NOTE — TELEPHONE ENCOUNTER
Aby Zayas Dr. is requesting lab orders prior to her AWV on 10/7/2019. She would like an A1C order to be include in the lab orders.     Please Review     Gerri

## 2019-09-03 NOTE — PROGRESS NOTES
Outcome: Introduction completed. Scheduled AWV and pre-visit completed. Pt requested lab order prior and I have sent a request to PCP. Pt asked if I knew of any Dental programs or places for low income as she doesn't have dental coverage. I advised pt that I was not aware of any dental programs, but I will do some research and call her back with any information I find. Pt agreed. I also told pt that I would call back once lab orders were in our system to schedule an appt.Pt agreed. Per pt, she is very good at budgeting her money to make sure she can afford the essentials. I asked pt if she would like a voucher for food and she said no as she goes to food pantries already. She also mentioned that we shouldn;t schedule anything on the 3rd of the month as she gets her check that day and she runs errands paying bills that day. Per pt, she has not had a complete check in 2 years. She said she had her AWV but she didn't get any lab work done. I told pt I had requested lab orders and I would call with an update. Pt agree. No further questions or concerns at this moment.     Please transfer to Patient Outreach Team at [phone] when patient returns call.      Attempt # 2

## 2019-09-11 ENCOUNTER — PATIENT OUTREACH (OUTPATIENT)
Dept: HEALTH INFORMATION MANAGEMENT | Facility: OTHER | Age: 79
End: 2019-09-11

## 2019-09-11 SDOH — ECONOMIC STABILITY: FOOD INSECURITY: WITHIN THE PAST 12 MONTHS, THE FOOD YOU BOUGHT JUST DIDN'T LAST AND YOU DIDN'T HAVE MONEY TO GET MORE.: SOMETIMES TRUE

## 2019-09-11 SDOH — ECONOMIC STABILITY: FOOD INSECURITY: WITHIN THE PAST 12 MONTHS, YOU WORRIED THAT YOUR FOOD WOULD RUN OUT BEFORE YOU GOT MONEY TO BUY MORE.: SOMETIMES TRUE

## 2019-09-11 NOTE — PROGRESS NOTES
Outcome: Called pt to let her know that lab orders are placed and to schedule a lab appointment a week before her AWV. There are no appointments available in the morning--at the time pt is requesting--so she will do a walk in on Monday Sept 30th. Advised pt that PCP wants to do a physical at the time of the appointment and since that's not part of the Annual she will have a copay of $10 for the office visit. Pt doesn't understand why she would have to pay a copay as she said she hasn't had a copay ever. I explained to her that the AWV is at no cost to pt but since PCP will do more than what the AWV covers there is a copay. Pt agree to take the money for a copay at the time of appointment. Pt asked about dental programs that are free or at low cost. I told pt I would research this and get back to her. She gave me a phone number given by a friend for the aging office as they might be able to help. Pt also mentioned not having enough food at home so I asked again if she would like a food voucher. She agreed to get a food voucher as she doesn't qualify for Meals on wheels because she is able to get around and has a car to drive to the grocery store.     Please transfer to Patient Outreach Team at [phone] when patient returns call.    HealthConnect Verified: yes    Attempt # 1

## 2019-09-30 ENCOUNTER — HOSPITAL ENCOUNTER (OUTPATIENT)
Dept: LAB | Facility: MEDICAL CENTER | Age: 79
End: 2019-09-30
Attending: INTERNAL MEDICINE
Payer: MEDICARE

## 2019-09-30 DIAGNOSIS — Z13.6 SCREENING FOR CARDIOVASCULAR CONDITION: ICD-10-CM

## 2019-09-30 DIAGNOSIS — R73.02 IGT (IMPAIRED GLUCOSE TOLERANCE): ICD-10-CM

## 2019-09-30 LAB
ALBUMIN SERPL BCP-MCNC: 4.6 G/DL (ref 3.2–4.9)
ALBUMIN/GLOB SERPL: 1.9 G/DL
ALP SERPL-CCNC: 70 U/L (ref 30–99)
ALT SERPL-CCNC: 9 U/L (ref 2–50)
ANION GAP SERPL CALC-SCNC: 10 MMOL/L (ref 0–11.9)
AST SERPL-CCNC: 15 U/L (ref 12–45)
BILIRUB SERPL-MCNC: 0.7 MG/DL (ref 0.1–1.5)
BUN SERPL-MCNC: 16 MG/DL (ref 8–22)
CALCIUM SERPL-MCNC: 9.3 MG/DL (ref 8.5–10.5)
CHLORIDE SERPL-SCNC: 109 MMOL/L (ref 96–112)
CHOLEST SERPL-MCNC: 124 MG/DL (ref 100–199)
CO2 SERPL-SCNC: 25 MMOL/L (ref 20–33)
CREAT SERPL-MCNC: 0.78 MG/DL (ref 0.5–1.4)
EST. AVERAGE GLUCOSE BLD GHB EST-MCNC: 114 MG/DL
GLOBULIN SER CALC-MCNC: 2.4 G/DL (ref 1.9–3.5)
GLUCOSE SERPL-MCNC: 97 MG/DL (ref 65–99)
HBA1C MFR BLD: 5.6 % (ref 0–5.6)
HDLC SERPL-MCNC: 40 MG/DL
LDLC SERPL CALC-MCNC: 65 MG/DL
POTASSIUM SERPL-SCNC: 3.7 MMOL/L (ref 3.6–5.5)
PROT SERPL-MCNC: 7 G/DL (ref 6–8.2)
SODIUM SERPL-SCNC: 144 MMOL/L (ref 135–145)
TRIGL SERPL-MCNC: 94 MG/DL (ref 0–149)

## 2019-09-30 PROCEDURE — 36415 COLL VENOUS BLD VENIPUNCTURE: CPT

## 2019-09-30 PROCEDURE — 80061 LIPID PANEL: CPT

## 2019-09-30 PROCEDURE — 83036 HEMOGLOBIN GLYCOSYLATED A1C: CPT

## 2019-09-30 PROCEDURE — 80053 COMPREHEN METABOLIC PANEL: CPT

## 2019-10-07 ENCOUNTER — OFFICE VISIT (OUTPATIENT)
Dept: MEDICAL GROUP | Facility: MEDICAL CENTER | Age: 79
End: 2019-10-07
Payer: MEDICARE

## 2019-10-07 VITALS
HEART RATE: 72 BPM | RESPIRATION RATE: 12 BRPM | DIASTOLIC BLOOD PRESSURE: 80 MMHG | OXYGEN SATURATION: 93 % | BODY MASS INDEX: 24.59 KG/M2 | SYSTOLIC BLOOD PRESSURE: 122 MMHG | WEIGHT: 144 LBS | HEIGHT: 64 IN

## 2019-10-07 DIAGNOSIS — J44.9 CHRONIC OBSTRUCTIVE PULMONARY DISEASE, UNSPECIFIED COPD TYPE (HCC): ICD-10-CM

## 2019-10-07 DIAGNOSIS — Z85.118 HISTORY OF LUNG CANCER: ICD-10-CM

## 2019-10-07 DIAGNOSIS — E78.5 DYSLIPIDEMIA: ICD-10-CM

## 2019-10-07 DIAGNOSIS — Z00.00 MEDICARE ANNUAL WELLNESS VISIT, SUBSEQUENT: ICD-10-CM

## 2019-10-07 PROCEDURE — 8041 PR SCP AHA: Performed by: INTERNAL MEDICINE

## 2019-10-07 PROCEDURE — 99213 OFFICE O/P EST LOW 20 MIN: CPT | Mod: 25 | Performed by: INTERNAL MEDICINE

## 2019-10-07 PROCEDURE — G0439 PPPS, SUBSEQ VISIT: HCPCS | Performed by: INTERNAL MEDICINE

## 2019-10-07 RX ORDER — SIMVASTATIN 40 MG
40 TABLET ORAL EVERY EVENING
Qty: 100 TAB | Refills: 3 | Status: SHIPPED | OUTPATIENT
Start: 2019-10-07 | End: 2020-12-28 | Stop reason: SDUPTHER

## 2019-10-07 ASSESSMENT — ACTIVITIES OF DAILY LIVING (ADL): BATHING_REQUIRES_ASSISTANCE: 0

## 2019-10-07 ASSESSMENT — PATIENT HEALTH QUESTIONNAIRE - PHQ9: CLINICAL INTERPRETATION OF PHQ2 SCORE: 0

## 2019-10-07 ASSESSMENT — ENCOUNTER SYMPTOMS: GENERAL WELL-BEING: GOOD

## 2019-10-07 NOTE — PROGRESS NOTES
CC: Follow-up dyslipidemia due for wellness examination    HPI:   Cheli presents today with the following.      1. Medicare annual wellness visit, subsequent  Screenings performed below advance directives already on file    2. Dyslipidemia  History of dyslipidemia maintain on statin well-controlled based on recent blood work.  She is highly concerned about diabetes however A1c is normal.  She reports her sister has poorly controlled diabetes.  She denies any other complaints today.        Information for advance directives given to patient or instructed to bring in advance directives into to office to put in chart.      Depression Screening    Little interest or pleasure in doing things?  0 - not at all  Feeling down, depressed , or hopeless? 0 - not at all  Patient Health Questionnaire Score: 0     If depressive symptoms identified deferred to follow up visit unless specifically addressed in assessment and plan.    Interpretation of PHQ-9 Total Score   Score Severity   1-4 No Depression   5-9 Mild Depression   10-14 Moderate Depression   15-19 Moderately Severe Depression   20-27 Severe Depression    Screening for Cognitive Impairment    Three Minute Recall (village, kitchen, baby) 2/3    David clock face with all 12 numbers and set the hands to show 10 past 10.  Yes    Cognitive concerns identified deferred for follow up unless specifically addressed in assessment and plan.    Fall Risk Assessment    Has the patient had two or more falls in the last year or any fall with injury in the last year?  No    Safety Assessment    Throw rugs on floor.  No  Handrails on all stairs.  Yes  Good lighting in all hallways.  Yes  Difficulty hearing.  No  Patient counseled about all safety risks that were identified.    Functional Assessment ADLs    Are there any barriers preventing you from cooking for yourself or meeting nutritional needs?  No.    Are there any barriers preventing you from driving safely or obtaining  transportation?  No.    Are there any barriers preventing you from using a telephone or calling for help?  No.    Are there any barriers preventing you from shopping?  No.    Are there any barriers preventing you from taking care of your own finances?  No.    Are there any barriers preventing you from managing your medications?  No.    Are there any barriers preventing you from showering, bathing or dressing yourself?  No.    Are you currently engaging in any exercise or physical activity?  No.     What is your perception of your health?  Good.      Health Maintenance Summary                PFT SCREENING-FEV1 AND FEV/FVC RATIO / SPIROMETRY SHOULD BE PERFORMED ANNUALLY Overdue 11/19/1957     IMM ZOSTER VACCINES Postponed 10/11/2034 Originally 11/19/1989. Insurance/Financial    IMM INFLUENZA Postponed 10/12/2034 Originally 9/1/2019. Patient Refused    IMM PNEUMOCOCCAL VACCINE: 65+ Years Postponed 10/9/2035 Originally 11/19/2004. Patient Refused    Annual Wellness Visit Next Due 10/7/2020      Done 10/7/2019 Visit Dx: Medicare annual wellness visit, subsequent     Patient has more history with this topic...    BONE DENSITY Next Due 12/19/2021      Done 12/19/2016 DS-BONE DENSITY STUDY (DEXA)     Patient has more history with this topic...    COLONOSCOPY Next Due 2/26/2023      Patient Declined 2/26/2013           Patient Care Team:  Danny Buchanan M.D. as PCP - General (Internal Medicine)  Dejon Cadena M.D. as Consulting Physician (Ophthalmology)  Gerri Eastman as    John H Ganser, M.D. as Consulting Physician (Surgery)            Health Care Screening: Age-appropriate preventive services that Medicare covers were discussed today and ordered as indicated and agreed upon by the patient, and as recommended by USPTF and ACIP.     Patient Active Problem List    Diagnosis Date Noted   • History of lung cancer 09/18/2017   • Chronic obstructive pulmonary disease (HCC) 05/01/2017   •  Osteopenia 05/28/2014   • CATARACT    • Dyslipidemia        Current Outpatient Medications   Medication Sig Dispense Refill   • simvastatin (ZOCOR) 40 MG Tab Take 1 Tab by mouth every evening. 100 Tab 3   • non-formulary med Inhale 1 L/min by mouth as needed (shortness of breath).     • calcium carbonate (CALCIUM CARBONATE) 500 MG Tab Take 500 mg by mouth 2 times a day, with meals.     • vitamin D (CHOLECALCIFEROL) 1000 UNIT Tab Take 1,000 Units by mouth 2 Times a Day.       No current facility-administered medications for this visit.        Family History   Problem Relation Age of Onset   • Lung Disease Father         emphazema/Smoker   • Heart Attack Father    • Arthritis Mother    • Lung Disease Sister         COPD/Smoker   • Diabetes Sister    • No Known Problems Brother    • Heart Disease Brother    • No Known Problems Brother    • No Known Problems Son    • No Known Problems Maternal Grandmother    • Cancer Maternal Grandfather         throat cancer/ Pipe smoker   • Dementia Paternal Grandmother    • Cancer Paternal Grandfather    • Breast Cancer Paternal Aunt    • Cancer Paternal Aunt        Social History     Socioeconomic History   • Marital status:      Spouse name: Not on file   • Number of children: 1   • Years of education: Not on file   • Highest education level: Not on file   Occupational History   • Not on file   Social Needs   • Financial resource strain: Not on file   • Food insecurity:     Worry: Sometimes true     Inability: Sometimes true   • Transportation needs:     Medical: No     Non-medical: No   Tobacco Use   • Smoking status: Current Every Day Smoker     Packs/day: 1.00     Years: 63.00     Pack years: 63.00     Types: Cigarettes   • Smokeless tobacco: Never Used   • Tobacco comment: started at 15   Substance and Sexual Activity   • Alcohol use: No     Alcohol/week: 0.0 oz   • Drug use: No   • Sexual activity: Never     Partners: Male   Lifestyle   • Physical activity:     Days  "per week: Not on file     Minutes per session: Not on file   • Stress: Not on file   Relationships   • Social connections:     Talks on phone: Not on file     Gets together: Not on file     Attends Faith service: Not on file     Active member of club or organization: Not on file     Attends meetings of clubs or organizations: Not on file     Relationship status: Not on file   • Intimate partner violence:     Fear of current or ex partner: Not on file     Emotionally abused: Not on file     Physically abused: Not on file     Forced sexual activity: Not on file   Other Topics Concern   •  Service Not Asked   • Blood Transfusions Not Asked   • Caffeine Concern Not Asked   • Occupational Exposure Not Asked   • Hobby Hazards Not Asked   • Sleep Concern Not Asked   • Stress Concern Not Asked   • Weight Concern Not Asked   • Special Diet Not Asked   • Back Care Not Asked   • Exercise Not Asked   • Bike Helmet Not Asked   • Seat Belt Not Asked   • Self-Exams Not Asked   Social History Narrative   • Not on file       Past Surgical History:   Procedure Laterality Date   • THORACOSCOPY Right 9/18/2017    Procedure: THORACOSCOPY- LOBECTOMY LOWER;  Surgeon: John H Ganser, M.D.;  Location: SURGERY [facility];  Service: General   • NODE DISSECTION Right 9/18/2017    Procedure: NODE DISSECTION;  Surgeon: John H Ganser, M.D.;  Location: SURGERY [facility];  Service: General   • ABDOMINAL HYSTERECTOMY TOTAL     • CATARACT EXTRACTION WITH IOL Bilateral    • OTHER      rita cataracts       Allergies as of 10/07/2019 - Reviewed 10/19/2018   Allergen Reaction Noted   • Nkda [no known drug allergy]  09/22/2009        ROS: Denies Chest pain, SOB, LE edema.    /80   Pulse 72   Resp 12   Ht 1.626 m (5' 4\")   Wt 65.3 kg (144 lb)   SpO2 93%   BMI 24.72 kg/m²     Physical Exam:  Gen:         Alert and oriented, No apparent distress.  Neck:        No Lymphadenopathy or Bruits.  Lungs:     Clear to auscultation " bilaterally  CV:          Regular rate and rhythm. No murmurs, rubs or gallops.  Abd:         Soft non tender, non distended. Normal active bowel sounds.  No  Hepatosplenomegaly, No pulsatile masses.                   Ext:          No clubbing, cyanosis, edema.      Assessment and Plan.   78 y.o. female with the following issues.    1. Medicare annual wellness visit, subsequent  Discussed healthy lifestyle habits as well as screening regimens.  Adamant about not getting shots despite long discussion today.      2. Dyslipidemia  Discussion about diet exercise continue statin recheck labs next year  - simvastatin (ZOCOR) 40 MG Tab; Take 1 Tab by mouth every evening.  Dispense: 100 Tab; Refill: 3      3. Chronic obstructive pulmonary disease, unspecified COPD type (HCC)  Stable suggestion for PFTs patient declined    4. History of lung cancer  No signs of recurrence continues to smoke        Referrals offered: Community-based lifestyle interventions to reduce health risks and promote self-management and wellness, fall prevention, nutrition, physical activity, tobacco-use cessation, weight loss, and mental health services as per orders if indicated.    Discussion today about general wellness and lifestyle habits:    · Prevent falls and reduce trip hazards; Cautioned about securing or removing rugs.  · Have a working fire alarm and carbon monoxide detector;   · Engage in regular physical activity and social activities

## 2020-07-07 ENCOUNTER — PATIENT OUTREACH (OUTPATIENT)
Dept: HEALTH INFORMATION MANAGEMENT | Facility: OTHER | Age: 80
End: 2020-07-07

## 2020-07-08 NOTE — PROGRESS NOTES
Outcome: Left Message    Please transfer to Patient Outreach Team at [phone] when patient returns call.    HealthConnect Verified: yes    Attempt # 1

## 2020-08-26 ENCOUNTER — TELEPHONE (OUTPATIENT)
Dept: SCHEDULING | Facility: IMAGING CENTER | Age: 80
End: 2020-08-26

## 2020-08-26 DIAGNOSIS — R73.02 IGT (IMPAIRED GLUCOSE TOLERANCE): ICD-10-CM

## 2020-08-26 DIAGNOSIS — E78.5 DYSLIPIDEMIA: ICD-10-CM

## 2020-08-26 DIAGNOSIS — Z85.118 HISTORY OF LUNG CANCER: ICD-10-CM

## 2020-08-26 NOTE — TELEPHONE ENCOUNTER
Good Morning Dr. Buchanan,     This patient has scheduled her AWV for 9- and is requesting lab orders so she can have them completed by the time of appointment. Please review.     Thank you.

## 2020-09-21 ENCOUNTER — OFFICE VISIT (OUTPATIENT)
Dept: MEDICAL GROUP | Facility: MEDICAL CENTER | Age: 80
End: 2020-09-21
Payer: MEDICARE

## 2020-09-21 VITALS
HEIGHT: 64 IN | BODY MASS INDEX: 25.1 KG/M2 | OXYGEN SATURATION: 94 % | TEMPERATURE: 95.8 F | HEART RATE: 80 BPM | SYSTOLIC BLOOD PRESSURE: 160 MMHG | WEIGHT: 147 LBS | RESPIRATION RATE: 16 BRPM | DIASTOLIC BLOOD PRESSURE: 90 MMHG

## 2020-09-21 DIAGNOSIS — Z00.00 HEALTHCARE MAINTENANCE: ICD-10-CM

## 2020-09-21 DIAGNOSIS — J44.9 CHRONIC OBSTRUCTIVE PULMONARY DISEASE, UNSPECIFIED COPD TYPE (HCC): ICD-10-CM

## 2020-09-21 DIAGNOSIS — I10 ESSENTIAL HYPERTENSION: ICD-10-CM

## 2020-09-21 DIAGNOSIS — F17.200 TOBACCO DEPENDENCE: ICD-10-CM

## 2020-09-21 DIAGNOSIS — Z85.118 HISTORY OF LUNG CANCER: ICD-10-CM

## 2020-09-21 DIAGNOSIS — E78.5 DYSLIPIDEMIA: ICD-10-CM

## 2020-09-21 PROCEDURE — 99215 OFFICE O/P EST HI 40 MIN: CPT | Performed by: FAMILY MEDICINE

## 2020-09-21 RX ORDER — HYDROCHLOROTHIAZIDE 25 MG/1
25 TABLET ORAL DAILY
Qty: 90 TAB | Refills: 1 | Status: SHIPPED | OUTPATIENT
Start: 2020-09-21 | End: 2021-03-22

## 2020-09-21 ASSESSMENT — PATIENT HEALTH QUESTIONNAIRE - PHQ9: CLINICAL INTERPRETATION OF PHQ2 SCORE: 0

## 2020-09-21 NOTE — PROGRESS NOTES
CC: New patient: COPD, tobacco dependence, history of lung cancer, hypertension, hyperlipidemia    HPI:  Cheli presents today to establish new PCP.    Patient has been active and independent with all ADLs.  Has the following chronic medical issues:    Chronic obstructive pulmonary disease, unspecified COPD type (HCC)/ Tobacco dependence  Patient stated that she is mostly asymptomatic, however sometimes she gets cough from excessive smoking especially in the morning.  Does not want to use any albuterol.  Her oxygen has always been normal.Smokes about a pack a day for more than 60 years, develop lung cancer 3 years ago s/p right lower lobectomy.  Does not want to quit smoking.She is due for pulmonary function test, was done in 2017 showed mild obstructive lung disease.    History of lung cancer  Patient underwent right lower lobectomy in 9/2017, did not need further treatment.  Has been asymptomatic since then.  However she still smokes, does not want to quit    Dyslipidemia  He has been tolerating the statin. Denies muscle pain LFTs has been normal, has been on simvastatin 40 mg daily.    Essential hypertension  Blood pressure today is high, patient stated she was never diagnosed with high blood pressure.  I rechecked the blood pressure during this visit and it was 165/100.  Patient advised to check her blood pressure twice a day for a week and send it to me for reevaluation.  She stated that she cannot do that.  She preferred to start antihypertensive medication.  She has been asymptomatic.  Denies any headache, chest pain, shortness of breath    Declined any vaccinations.    Patient Active Problem List    Diagnosis Date Noted   • Tobacco dependence 09/21/2020   • History of lung cancer 09/18/2017   • Chronic obstructive pulmonary disease (HCC) 05/01/2017   • Osteopenia 05/28/2014   • CATARACT    • Dyslipidemia        Current Outpatient Medications   Medication Sig Dispense Refill   • hydroCHLOROthiazide  (HYDRODIURIL) 25 MG Tab Take 1 Tab by mouth every day. 90 Tab 1   • simvastatin (ZOCOR) 40 MG Tab Take 1 Tab by mouth every evening. 100 Tab 3   • calcium carbonate (CALCIUM CARBONATE) 500 MG Tab Take 500 mg by mouth 2 times a day, with meals.     • vitamin D (CHOLECALCIFEROL) 1000 UNIT Tab Take 1,000 Units by mouth 2 Times a Day.     • non-formulary med Inhale 1 L/min by mouth as needed (shortness of breath).       No current facility-administered medications for this visit.          Allergies as of 09/21/2020 - Reviewed 09/21/2020   Allergen Reaction Noted   • Nkda [no known drug allergy]  09/22/2009        Social History     Socioeconomic History   • Marital status:      Spouse name: Not on file   • Number of children: 1   • Years of education: Not on file   • Highest education level: Not on file   Occupational History   • Not on file   Social Needs   • Financial resource strain: Not on file   • Food insecurity     Worry: Sometimes true     Inability: Sometimes true   • Transportation needs     Medical: No     Non-medical: No   Tobacco Use   • Smoking status: Current Every Day Smoker     Packs/day: 1.00     Years: 63.00     Pack years: 63.00     Types: Cigarettes   • Smokeless tobacco: Never Used   • Tobacco comment: started at 15   Substance and Sexual Activity   • Alcohol use: No     Alcohol/week: 0.0 oz   • Drug use: No   • Sexual activity: Never     Partners: Male   Lifestyle   • Physical activity     Days per week: Not on file     Minutes per session: Not on file   • Stress: Not on file   Relationships   • Social connections     Talks on phone: Not on file     Gets together: Not on file     Attends Church service: Not on file     Active member of club or organization: Not on file     Attends meetings of clubs or organizations: Not on file     Relationship status: Not on file   • Intimate partner violence     Fear of current or ex partner: Not on file     Emotionally abused: Not on file      Physically abused: Not on file     Forced sexual activity: Not on file   Other Topics Concern   •  Service Not Asked   • Blood Transfusions Not Asked   • Caffeine Concern Not Asked   • Occupational Exposure Not Asked   • Hobby Hazards Not Asked   • Sleep Concern Not Asked   • Stress Concern Not Asked   • Weight Concern Not Asked   • Special Diet Not Asked   • Back Care Not Asked   • Exercise Not Asked   • Bike Helmet Not Asked   • Seat Belt Not Asked   • Self-Exams Not Asked   Social History Narrative   • Not on file       Family History   Problem Relation Age of Onset   • Lung Disease Father         emphazema/Smoker   • Heart Attack Father    • Arthritis Mother    • Lung Disease Sister         COPD/Smoker   • Diabetes Sister    • No Known Problems Brother    • Heart Disease Brother    • No Known Problems Brother    • No Known Problems Son    • No Known Problems Maternal Grandmother    • Cancer Maternal Grandfather         throat cancer/ Pipe smoker   • Dementia Paternal Grandmother    • Cancer Paternal Grandfather    • Breast Cancer Paternal Aunt    • Cancer Paternal Aunt        Past Surgical History:   Procedure Laterality Date   • THORACOSCOPY Right 9/18/2017    Procedure: THORACOSCOPY- LOBECTOMY LOWER;  Surgeon: John H Ganser, M.D.;  Location: SURGERY [facility];  Service: General   • NODE DISSECTION Right 9/18/2017    Procedure: NODE DISSECTION;  Surgeon: John H Ganser, M.D.;  Location: SURGERY [facility];  Service: General   • ABDOMINAL HYSTERECTOMY TOTAL     • CATARACT EXTRACTION WITH IOL Bilateral    • OTHER      rita cataracts       ROS:  Denies any Headache, Blurred Vision, Confusion Chest pain,  Shortness of breath,  Abdominal pain, Changes of bowel or bladder, Lower ext edema, Fevers, Nights sweats, Weight Changes, Focal weakness or numbness.  All other systems are negative.    /90 (BP Location: Right arm, Patient Position: Sitting, BP Cuff Size: Adult)   Pulse 80   Temp (!) 35.4  "°C (95.8 °F) (Temporal)   Resp 16   Ht 1.626 m (5' 4\")   Wt 66.7 kg (147 lb)   SpO2 94%   BMI 25.23 kg/m²     Physical Exam:  Gen:         Alert and oriented, No apparent distress.  HEENT:   Perrla, TM clear,  Oralpharynx no erythema or exudates.  Neck:       No Jugular venous distension, Lymphadenopathy, Thyromegaly, Bruits.  Lungs:     Clear to auscultation bilaterally  CV:          Regular rate and rhythm. No murmurs, rubs or gallops.  Abd:         Soft non tender, non distended. Normal active bowel sounds. No                                        Hepatosplenomegaly, No pulsatile masses.  Ext:          No clubbing, cyanosis, edema.      Assessment and Plan.   79 y.o. female     1. Chronic obstructive pulmonary disease, unspecified COPD type (HCC)  Stable.  Asymptomatic.  Smoking cessation discussed does not want to quit.  She has been having smoker's cough.  Does not want to use any medication.  Oxygen saturation has always been above 90%.  She is due for pulmonary function test, was done in 2017 showed mild obstructive lung disease.    - PULMONARY FUNCTION TESTS -Test requested: Complete Pulmonary Function Test; Future    2. Tobacco dependence  Smokes about a pack a day for more than 60 years, develop lung cancer 3 years ago s/p right lower lobectomy.  Does not want to quit smoking    3. History of lung cancer  Status post right lower lobectomy in 9/2017, did not need further treatment.  Has been asymptomatic.  Patient still smokes, does not want to quit    4. Dyslipidemia  He has been tolerating the statin. Denies muscle pain LFTs has been normal  Continue simvastatin 40 mg daily.    5. Essential hypertension  Newly diagnosed.  Patient advised to check her blood pressure twice a day for a week and send it to me for reevaluation.  She stated that she cannot do that.  She preferred to start antihypertensive medication.  Will start patient on hydrochlorothiazide 25 mg daily.    - hydroCHLOROthiazide " (HYDRODIURIL) 25 MG Tab; Take 1 Tab by mouth every day.  Dispense: 90 Tab; Refill: 1  - CBC WITH DIFFERENTIAL; Future  - Comp Metabolic Panel; Future  - Lipid Profile; Future  - TSH; Future    6. Healthcare maintenance  Declined any vaccinations.

## 2020-10-09 ENCOUNTER — HOSPITAL ENCOUNTER (OUTPATIENT)
Dept: LAB | Facility: MEDICAL CENTER | Age: 80
End: 2020-10-09
Attending: FAMILY MEDICINE
Payer: MEDICARE

## 2020-10-09 DIAGNOSIS — I10 ESSENTIAL HYPERTENSION: ICD-10-CM

## 2020-10-09 LAB
BASOPHILS # BLD AUTO: 0.5 % (ref 0–1.8)
BASOPHILS # BLD: 0.04 K/UL (ref 0–0.12)
EOSINOPHIL # BLD AUTO: 0.14 K/UL (ref 0–0.51)
EOSINOPHIL NFR BLD: 1.8 % (ref 0–6.9)
ERYTHROCYTE [DISTWIDTH] IN BLOOD BY AUTOMATED COUNT: 47.3 FL (ref 35.9–50)
HCT VFR BLD AUTO: 48.4 % (ref 37–47)
HGB BLD-MCNC: 15.9 G/DL (ref 12–16)
IMM GRANULOCYTES # BLD AUTO: 0.02 K/UL (ref 0–0.11)
IMM GRANULOCYTES NFR BLD AUTO: 0.3 % (ref 0–0.9)
LYMPHOCYTES # BLD AUTO: 2.05 K/UL (ref 1–4.8)
LYMPHOCYTES NFR BLD: 26.5 % (ref 22–41)
MCH RBC QN AUTO: 31.2 PG (ref 27–33)
MCHC RBC AUTO-ENTMCNC: 32.9 G/DL (ref 33.6–35)
MCV RBC AUTO: 95.1 FL (ref 81.4–97.8)
MONOCYTES # BLD AUTO: 0.69 K/UL (ref 0–0.85)
MONOCYTES NFR BLD AUTO: 8.9 % (ref 0–13.4)
NEUTROPHILS # BLD AUTO: 4.79 K/UL (ref 2–7.15)
NEUTROPHILS NFR BLD: 62 % (ref 44–72)
NRBC # BLD AUTO: 0 K/UL
NRBC BLD-RTO: 0 /100 WBC
PLATELET # BLD AUTO: 191 K/UL (ref 164–446)
PMV BLD AUTO: 13.3 FL (ref 9–12.9)
RBC # BLD AUTO: 5.09 M/UL (ref 4.2–5.4)
WBC # BLD AUTO: 7.7 K/UL (ref 4.8–10.8)

## 2020-10-09 PROCEDURE — 80061 LIPID PANEL: CPT

## 2020-10-09 PROCEDURE — 36415 COLL VENOUS BLD VENIPUNCTURE: CPT

## 2020-10-09 PROCEDURE — 85025 COMPLETE CBC W/AUTO DIFF WBC: CPT

## 2020-10-09 PROCEDURE — 84443 ASSAY THYROID STIM HORMONE: CPT

## 2020-10-09 PROCEDURE — 80053 COMPREHEN METABOLIC PANEL: CPT

## 2020-10-10 LAB
ALBUMIN SERPL BCP-MCNC: 4.5 G/DL (ref 3.2–4.9)
ALBUMIN/GLOB SERPL: 1.7 G/DL
ALP SERPL-CCNC: 78 U/L (ref 30–99)
ALT SERPL-CCNC: 14 U/L (ref 2–50)
ANION GAP SERPL CALC-SCNC: 7 MMOL/L (ref 7–16)
AST SERPL-CCNC: 17 U/L (ref 12–45)
BILIRUB SERPL-MCNC: 0.6 MG/DL (ref 0.1–1.5)
BUN SERPL-MCNC: 16 MG/DL (ref 8–22)
CALCIUM SERPL-MCNC: 9.4 MG/DL (ref 8.5–10.5)
CHLORIDE SERPL-SCNC: 103 MMOL/L (ref 96–112)
CHOLEST SERPL-MCNC: 140 MG/DL (ref 100–199)
CO2 SERPL-SCNC: 29 MMOL/L (ref 20–33)
CREAT SERPL-MCNC: 0.77 MG/DL (ref 0.5–1.4)
FASTING STATUS PATIENT QL REPORTED: NORMAL
GLOBULIN SER CALC-MCNC: 2.7 G/DL (ref 1.9–3.5)
GLUCOSE SERPL-MCNC: 106 MG/DL (ref 65–99)
HDLC SERPL-MCNC: 51 MG/DL
LDLC SERPL CALC-MCNC: 67 MG/DL
POTASSIUM SERPL-SCNC: 3.7 MMOL/L (ref 3.6–5.5)
PROT SERPL-MCNC: 7.2 G/DL (ref 6–8.2)
SODIUM SERPL-SCNC: 139 MMOL/L (ref 135–145)
TRIGL SERPL-MCNC: 108 MG/DL (ref 0–149)
TSH SERPL DL<=0.005 MIU/L-ACNC: 1.21 UIU/ML (ref 0.38–5.33)

## 2020-10-27 ENCOUNTER — HOSPITAL ENCOUNTER (OUTPATIENT)
Dept: PULMONOLOGY | Facility: MEDICAL CENTER | Age: 80
End: 2020-10-27
Attending: FAMILY MEDICINE
Payer: MEDICARE

## 2020-10-27 DIAGNOSIS — J44.9 CHRONIC OBSTRUCTIVE PULMONARY DISEASE, UNSPECIFIED COPD TYPE (HCC): ICD-10-CM

## 2020-10-27 PROCEDURE — 94060 EVALUATION OF WHEEZING: CPT

## 2020-10-27 PROCEDURE — 94729 DIFFUSING CAPACITY: CPT

## 2020-10-27 PROCEDURE — 94726 PLETHYSMOGRAPHY LUNG VOLUMES: CPT

## 2020-10-27 RX ORDER — ALBUTEROL SULFATE 90 UG/1
2 AEROSOL, METERED RESPIRATORY (INHALATION)
Status: DISCONTINUED | OUTPATIENT
Start: 2020-10-27 | End: 2020-10-28 | Stop reason: HOSPADM

## 2020-10-27 ASSESSMENT — PULMONARY FUNCTION TESTS
FEV1_PREDICTED: 1.89
FEV1_PERCENT_CHANGE: 5
FVC: 2.73
FEV1/FVC_PERCENT_LLN: 64.36
FEV1/FVC_PERCENT_PREDICTED: 94
FEV1/FVC_PERCENT_PREDICTED: 99
FVC_LLN: 2.08
FEV1/FVC: 75.49
FEV1_PERCENT_CHANGE: 0
FEV1/FVC_PERCENT_PREDICTED: 76
FEV1/FVC_PREDICTED: 77.08
FEV1_PERCENT_PREDICTED: 103
FVC_PERCENT_PREDICTED: 109
FVC_PREDICTED: 2.49
FEV1/FVC_PERCENT_LLN: 64.36
FEV1/FVC: 72
FEV1: 1.96
FEV1/FVC: 75.46
FEV1_LLN: 1.58
FEV1/FVC_PERCENT_PREDICTED: 93
FEV1: 2.06
FEV1/FVC_PERCENT_PREDICTED: 97
FEV1/FVC: 72.01
FEV1_PERCENT_PREDICTED: 108
FVC_PERCENT_PREDICTED: 109
FEV1/FVC_PERCENT_CHANGE: +4
FEV1_LLN: 2.21
FVC_LLN: 2.90
FVC: 2.73

## 2020-11-01 NOTE — PROCEDURES
PULMONARY FUNCTION TEST INTERPRETATION    REQUESTING PROVIDER:  Pilar Ryder MD    REASON FOR REQUEST:  COPD.    FINDINGS:  1.  Acceptable and reproducible.  2.  FEV1 1.96 liters (103%), FVC 2.73 liters (109%), ratio 72%.  3.  Flow volume loops consistent with peripheral airway obstruction.  4.  TLC 7.05 liters (142%).  5.  DLCO 12.19 mL/min/mmHg (66%).    IMPRESSION:  Normal spirometry.  No response to bronchodilators, evidence of   air trapping, hyperinflation, and mild reduction in gas transfer.  Consistent   with underlying diagnosis of chronic obstructive pulmonary disease.       ____________________________________     MD JEET Conner / TOBIAS    DD:  11/01/2020 12:48:01  DT:  11/01/2020 15:02:34    D#:  [id number]  Job#:  [id number]    cc: Pilar Ryder MD

## 2020-11-02 PROCEDURE — 94729 DIFFUSING CAPACITY: CPT | Mod: 26 | Performed by: INTERNAL MEDICINE

## 2020-11-02 PROCEDURE — 94726 PLETHYSMOGRAPHY LUNG VOLUMES: CPT | Mod: 26 | Performed by: INTERNAL MEDICINE

## 2020-11-02 PROCEDURE — 94060 EVALUATION OF WHEEZING: CPT | Mod: 26 | Performed by: INTERNAL MEDICINE

## 2020-12-28 DIAGNOSIS — E78.5 DYSLIPIDEMIA: ICD-10-CM

## 2020-12-28 RX ORDER — SIMVASTATIN 40 MG
40 TABLET ORAL EVERY EVENING
Qty: 100 TAB | Refills: 0 | Status: SHIPPED | OUTPATIENT
Start: 2020-12-28 | End: 2021-04-28

## 2021-01-11 DIAGNOSIS — Z23 NEED FOR VACCINATION: ICD-10-CM

## 2021-03-22 ENCOUNTER — OFFICE VISIT (OUTPATIENT)
Dept: MEDICAL GROUP | Facility: MEDICAL CENTER | Age: 81
End: 2021-03-22
Payer: MEDICARE

## 2021-03-22 VITALS
HEIGHT: 64 IN | OXYGEN SATURATION: 93 % | BODY MASS INDEX: 24.99 KG/M2 | WEIGHT: 146.39 LBS | SYSTOLIC BLOOD PRESSURE: 140 MMHG | TEMPERATURE: 98.2 F | RESPIRATION RATE: 16 BRPM | HEART RATE: 107 BPM | DIASTOLIC BLOOD PRESSURE: 80 MMHG

## 2021-03-22 DIAGNOSIS — I10 ESSENTIAL HYPERTENSION: ICD-10-CM

## 2021-03-22 DIAGNOSIS — J44.9 CHRONIC OBSTRUCTIVE PULMONARY DISEASE, UNSPECIFIED COPD TYPE (HCC): ICD-10-CM

## 2021-03-22 DIAGNOSIS — Z85.118 HISTORY OF LUNG CANCER: ICD-10-CM

## 2021-03-22 DIAGNOSIS — F17.200 TOBACCO DEPENDENCE: ICD-10-CM

## 2021-03-22 PROCEDURE — 99214 OFFICE O/P EST MOD 30 MIN: CPT | Performed by: FAMILY MEDICINE

## 2021-03-22 RX ORDER — LOSARTAN POTASSIUM 25 MG/1
25 TABLET ORAL DAILY
Qty: 90 TABLET | Refills: 3 | Status: SHIPPED | OUTPATIENT
Start: 2021-03-22 | End: 2021-12-06

## 2021-03-22 ASSESSMENT — FIBROSIS 4 INDEX: FIB4 SCORE: 1.93

## 2021-03-22 NOTE — PROGRESS NOTES
Annual Health Assessment Questions:    1.  Are you currently engaging in any exercise or physical activity? No    2.  How would you describe your mood or emotional well-being today? good    3.  Have you had any falls in the last year? No    4.  Have you noticed any problems with your balance or had difficulty walking? No    5.  In the last six months have you experienced any leakage of urine? No    6. DPA/Advanced Directive: Patient has Advanced Directive on file.      CC: COPD, tobacco dependence, history of lung cancer, hypertension    HPI:   Cheli presents today to discuss the following medical issues:    Chronic obstructive pulmonary disease, unspecified COPD type (HCC)/ Tobacco dependence  Patient stated that she is mostly asymptomatic. However she has been having patient has been having shortness of breath on stairs.  Otherwise she has been asymptomatic. She has not needed the albuterol for a while..  She has been having normal oxygen saturation.  Not on oxygen.Still smokes about 10 to 15 cigarettes a day, not ready to completely quit but she will try gradually to cut down on her smoking, which she has been doing in the past few years.     History of lung cancer  Patient underwent right lower lobectomy in 9/2017, did not need further treatment.  Has been asymptomatic since then.  However she still smokes( 10-15 cig/day), does not want to quit     Essential hypertension  Has been adequately controlled on current medication. Denies headache, chest pain, and SOB.Patient has been on HCTZ 25 mg daily. However patient stated that she does not like it because it makes her urinating all the time.  Wants to change the medication.    Patient Active Problem List    Diagnosis Date Noted   • Tobacco dependence 09/21/2020   • History of lung cancer 09/18/2017   • Chronic obstructive pulmonary disease (HCC) 05/01/2017   • Osteopenia 05/28/2014   • CATARACT    • Dyslipidemia        Current Outpatient Medications   Medication  "Sig Dispense Refill   • losartan (COZAAR) 25 MG Tab Take 1 tablet by mouth every day. 90 tablet 3   • simvastatin (ZOCOR) 40 MG Tab Take 1 Tab by mouth every evening. 100 Tab 0   • non-formulary med Inhale 1 L/min by mouth as needed (shortness of breath).     • calcium carbonate (CALCIUM CARBONATE) 500 MG Tab Take 500 mg by mouth 2 times a day, with meals.     • vitamin D (CHOLECALCIFEROL) 1000 UNIT Tab Take 1,000 Units by mouth 2 Times a Day.       No current facility-administered medications for this visit.         Allergies as of 03/22/2021 - Reviewed 09/21/2020   Allergen Reaction Noted   • Nkda [no known drug allergy]  09/22/2009        ROS: Denies any chest pain, Shortness of breath, Changes bowel or bladder, Lower extremity edema.    Physical Exam:  /80 (BP Location: Right arm, Patient Position: Sitting, BP Cuff Size: Adult)   Pulse (!) 107   Temp 36.8 °C (98.2 °F) (Temporal)   Resp 16   Ht 1.626 m (5' 4\")   Wt 66.4 kg (146 lb 6.2 oz)   SpO2 93%   BMI 25.13 kg/m²   Gen.: Well-developed, well-nourished, no apparent distress,pleasant and cooperative with the examination  Skin:  Warm and dry with good turgor. No rashes or suspicious lesions in visible areas  HEENT:Sinuses nontender with palpation, TMs clear, nares patent with pink mucosa and clear rhinorrhea,no septal deviation ,polyps or lesions. lips without lesions, oropharynx clear.  Neck: Trachea midline,no masses or adenopathy. No JVD.  Cor: Regular rate and rhythm without murmur, gallop or rub.  Lungs: Respirations unlabored.Clear to auscultation with equal breath sounds bilaterally. No wheezes, rhonchi.  Extremities: No cyanosis, clubbing or edema.        Assessment and Plan.   80 y.o. female     1. Essential hypertension  Controlled.  Patient has been on hydrochlorothiazide 25 mg, however patient stated that she does not like it because it makes her urinating all the time.  Wants to change the medication.  We will start patient on losartan " 25 mg daily.    - losartan (COZAAR) 25 MG Tab; Take 1 tablet by mouth every day.  Dispense: 90 tablet; Refill: 3    2. Chronic obstructive pulmonary disease, unspecified COPD type (HCC)  Has been having patient has been having shortness of breath on stairs.  Otherwise she has been asymptomatic  Has not needed the albuterol recently.    3. History of lung cancer  S/p lower right lobectomy in 2017.  Last pulmonary function test was done recently in 11/ 2020, showed mild COPD    4. Tobacco dependence  Still smokes about 10 to 15 cigarettes a day, not ready to completely quit but she will try gradually to cut down on her smoking, which she has been doing in the past few years.

## 2021-04-28 DIAGNOSIS — E78.5 DYSLIPIDEMIA: ICD-10-CM

## 2021-04-28 RX ORDER — SIMVASTATIN 40 MG
TABLET ORAL
Qty: 100 TABLET | Refills: 3 | Status: SHIPPED | OUTPATIENT
Start: 2021-04-28 | End: 2022-04-21 | Stop reason: SDUPTHER

## 2021-07-07 ENCOUNTER — PATIENT OUTREACH (OUTPATIENT)
Dept: HEALTH INFORMATION MANAGEMENT | Facility: OTHER | Age: 81
End: 2021-07-07

## 2021-07-07 NOTE — PROGRESS NOTES
Outcome: Declined Comprehensive Health Assessment.    Outbound call to mbr and she declined Comprehensive Health Assessment. Mbr was provided  contact information. Mbr declined to verify HIPAA information and unable to review Epic chart details  (care gaps, pharmacy etc.). Mbr will call back to discuss [facility] dental information and OTC benefits. No further needs at this time.     Please transfer to Patient Outreach Team at [phone] when patient returns call.    Attempt # 1

## 2021-09-23 ENCOUNTER — HOSPITAL ENCOUNTER (OUTPATIENT)
Dept: LAB | Facility: MEDICAL CENTER | Age: 81
End: 2021-09-23
Attending: FAMILY MEDICINE
Payer: MEDICARE

## 2021-09-23 DIAGNOSIS — I10 ESSENTIAL HYPERTENSION: ICD-10-CM

## 2021-09-23 LAB
ALBUMIN SERPL BCP-MCNC: 4.4 G/DL (ref 3.2–4.9)
ALBUMIN/GLOB SERPL: 1.6 G/DL
ALP SERPL-CCNC: 78 U/L (ref 30–99)
ALT SERPL-CCNC: 6 U/L (ref 2–50)
ANION GAP SERPL CALC-SCNC: 10 MMOL/L (ref 7–16)
AST SERPL-CCNC: 13 U/L (ref 12–45)
BASOPHILS # BLD AUTO: 0.7 % (ref 0–1.8)
BASOPHILS # BLD: 0.05 K/UL (ref 0–0.12)
BILIRUB SERPL-MCNC: 0.4 MG/DL (ref 0.1–1.5)
BUN SERPL-MCNC: 19 MG/DL (ref 8–22)
CALCIUM SERPL-MCNC: 9.4 MG/DL (ref 8.5–10.5)
CHLORIDE SERPL-SCNC: 108 MMOL/L (ref 96–112)
CHOLEST SERPL-MCNC: 143 MG/DL (ref 100–199)
CO2 SERPL-SCNC: 25 MMOL/L (ref 20–33)
CREAT SERPL-MCNC: 0.69 MG/DL (ref 0.5–1.4)
EOSINOPHIL # BLD AUTO: 0.15 K/UL (ref 0–0.51)
EOSINOPHIL NFR BLD: 2.2 % (ref 0–6.9)
ERYTHROCYTE [DISTWIDTH] IN BLOOD BY AUTOMATED COUNT: 49.5 FL (ref 35.9–50)
GLOBULIN SER CALC-MCNC: 2.7 G/DL (ref 1.9–3.5)
GLUCOSE SERPL-MCNC: 93 MG/DL (ref 65–99)
HCT VFR BLD AUTO: 48.4 % (ref 37–47)
HDLC SERPL-MCNC: 52 MG/DL
HGB BLD-MCNC: 15.6 G/DL (ref 12–16)
IMM GRANULOCYTES # BLD AUTO: 0.02 K/UL (ref 0–0.11)
IMM GRANULOCYTES NFR BLD AUTO: 0.3 % (ref 0–0.9)
LDLC SERPL CALC-MCNC: 74 MG/DL
LYMPHOCYTES # BLD AUTO: 2.4 K/UL (ref 1–4.8)
LYMPHOCYTES NFR BLD: 35.2 % (ref 22–41)
MCH RBC QN AUTO: 31.1 PG (ref 27–33)
MCHC RBC AUTO-ENTMCNC: 32.2 G/DL (ref 33.6–35)
MCV RBC AUTO: 96.4 FL (ref 81.4–97.8)
MONOCYTES # BLD AUTO: 0.53 K/UL (ref 0–0.85)
MONOCYTES NFR BLD AUTO: 7.8 % (ref 0–13.4)
NEUTROPHILS # BLD AUTO: 3.66 K/UL (ref 2–7.15)
NEUTROPHILS NFR BLD: 53.8 % (ref 44–72)
NRBC # BLD AUTO: 0 K/UL
NRBC BLD-RTO: 0 /100 WBC
PLATELET # BLD AUTO: 194 K/UL (ref 164–446)
PMV BLD AUTO: 13.3 FL (ref 9–12.9)
POTASSIUM SERPL-SCNC: 4.1 MMOL/L (ref 3.6–5.5)
PROT SERPL-MCNC: 7.1 G/DL (ref 6–8.2)
RBC # BLD AUTO: 5.02 M/UL (ref 4.2–5.4)
SODIUM SERPL-SCNC: 143 MMOL/L (ref 135–145)
TRIGL SERPL-MCNC: 84 MG/DL (ref 0–149)
TSH SERPL DL<=0.005 MIU/L-ACNC: 2.1 UIU/ML (ref 0.38–5.33)
WBC # BLD AUTO: 6.8 K/UL (ref 4.8–10.8)

## 2021-09-23 PROCEDURE — 84443 ASSAY THYROID STIM HORMONE: CPT

## 2021-09-23 PROCEDURE — 36415 COLL VENOUS BLD VENIPUNCTURE: CPT

## 2021-09-23 PROCEDURE — 80053 COMPREHEN METABOLIC PANEL: CPT

## 2021-09-23 PROCEDURE — 80061 LIPID PANEL: CPT

## 2021-09-23 PROCEDURE — 85025 COMPLETE CBC W/AUTO DIFF WBC: CPT

## 2021-10-05 ENCOUNTER — OFFICE VISIT (OUTPATIENT)
Dept: MEDICAL GROUP | Facility: MEDICAL CENTER | Age: 81
End: 2021-10-05
Payer: MEDICARE

## 2021-10-05 VITALS
HEIGHT: 64 IN | BODY MASS INDEX: 24.24 KG/M2 | DIASTOLIC BLOOD PRESSURE: 94 MMHG | HEART RATE: 95 BPM | RESPIRATION RATE: 16 BRPM | WEIGHT: 142 LBS | SYSTOLIC BLOOD PRESSURE: 160 MMHG | OXYGEN SATURATION: 94 % | TEMPERATURE: 98.3 F

## 2021-10-05 DIAGNOSIS — F17.200 TOBACCO DEPENDENCE: ICD-10-CM

## 2021-10-05 DIAGNOSIS — J44.9 CHRONIC OBSTRUCTIVE PULMONARY DISEASE, UNSPECIFIED COPD TYPE (HCC): ICD-10-CM

## 2021-10-05 DIAGNOSIS — Z85.118 HISTORY OF LUNG CANCER: ICD-10-CM

## 2021-10-05 DIAGNOSIS — E78.5 DYSLIPIDEMIA: ICD-10-CM

## 2021-10-05 DIAGNOSIS — I10 ESSENTIAL HYPERTENSION: ICD-10-CM

## 2021-10-05 PROCEDURE — 99214 OFFICE O/P EST MOD 30 MIN: CPT | Performed by: FAMILY MEDICINE

## 2021-10-05 RX ORDER — LOSARTAN POTASSIUM 50 MG/1
50 TABLET ORAL DAILY
Qty: 90 TABLET | Refills: 2 | Status: SHIPPED | OUTPATIENT
Start: 2021-10-05 | End: 2021-12-06

## 2021-10-05 ASSESSMENT — PATIENT HEALTH QUESTIONNAIRE - PHQ9: CLINICAL INTERPRETATION OF PHQ2 SCORE: 0

## 2021-10-05 ASSESSMENT — FIBROSIS 4 INDEX: FIB4 SCORE: 2.22

## 2021-10-05 NOTE — PROGRESS NOTES
CC: Hypertension, hyperlipidemia, COPD, tobacco dependence, history of lung cancer    HPI:   Cheli presents today to discuss the following:    Essential hypertension  Blood pressure today is high.  However patient denies headache, chest pain, and SOB.She has been on losartan 25 mg daily.    Dyslipidemia  She has been tolerating the statin. Denies muscle pain LFTs has been normal.  Patient has been simvastatin 40 mg daily    Chronic obstructive pulmonary disease, unspecified COPD type (HCC)/ Tobacco dependence  Patient stated that she is mostly asymptomatic. However she has been having patient has been having shortness of breath on stairs.  Otherwise she has been asymptomatic. She has not needed the albuterol for a while..  She has been having normal oxygen saturation.  Not on oxygen.Still smokes about 10 to 15 cigarettes a day, not ready to completely quit but she will continue gradually cutting down number of cigarettes she smokes.     History of lung cancer  Patient underwent right lower lobectomy in 9/2017, did not need further treatment.  Has been asymptomatic since then.  However she still smokes( 10-15 cig/day), does not want to quit     Patient Active Problem List    Diagnosis Date Noted   • Tobacco dependence 09/21/2020   • History of lung cancer 09/18/2017   • Chronic obstructive pulmonary disease (HCC) 05/01/2017   • Osteopenia 05/28/2014   • CATARACT    • Dyslipidemia        Current Outpatient Medications   Medication Sig Dispense Refill   • simvastatin (ZOCOR) 40 MG Tab TAKE 1 TABLET BY MOUTH ONCE DAILY IN THE EVENING 100 tablet 3   • losartan (COZAAR) 25 MG Tab Take 1 tablet by mouth every day. 90 tablet 3   • non-formulary med Inhale 1 L/min by mouth as needed (shortness of breath).     • calcium carbonate (CALCIUM CARBONATE) 500 MG Tab Take 500 mg by mouth 2 times a day, with meals.     • vitamin D (CHOLECALCIFEROL) 1000 UNIT Tab Take 1,000 Units by mouth 2 Times a Day.       No current  "facility-administered medications for this visit.         Allergies as of 10/05/2021 - Reviewed 10/05/2021   Allergen Reaction Noted   • Nkda [no known drug allergy]  09/22/2009        ROS: Denies any chest pain, Shortness of breath, Changes bowel or bladder, Lower extremity edema.    Physical Exam:  /94 (BP Location: Right arm, Patient Position: Sitting, BP Cuff Size: Adult)   Pulse 95   Temp 36.8 °C (98.3 °F) (Temporal)   Resp 16   Ht 1.626 m (5' 4\")   Wt 64.4 kg (142 lb)   SpO2 94%   BMI 24.37 kg/m²   Gen.: Well-developed, well-nourished, no apparent distress,pleasant and cooperative with the examination  Skin:  Warm and dry with good turgor. No rashes or suspicious lesions in visible areas  HEENT:Sinuses nontender with palpation, TMs clear, nares patent with pink mucosa and clear rhinorrhea,no septal deviation ,polyps or lesions. lips without lesions, oropharynx clear.  Neck: Trachea midline,no masses or adenopathy. No JVD.  Cor: Regular rate and rhythm without murmur, gallop or rub.  Lungs: Respirations unlabored.Clear to auscultation with equal breath sounds bilaterally. No wheezes, rhonchi.  Extremities: No cyanosis, clubbing or edema.        Assessment and Plan.   80 y.o. female     1. Essential hypertension  High BP. Will increase losartan from 25 mg to 50 mg daily.    - losartan (COZAAR) 50 MG Tab; Take 1 Tablet by mouth every day.  Dispense: 90 Tablet; Refill: 2    2. Dyslipidemia  She has been tolerating the statin. Denies muscle pain LFTs has been normal  Continue simvastatin 40 mg daily    3. Chronic obstructive pulmonary disease, unspecified COPD type (HCC)/  Stable.  Asymptomatic.  Continue on albuterol as needed  Smoking cessation discussed    4. History of lung cancer  S/P right lower lobectomy in 9/2017, did not need further treatment.  Has been asymptomatic since then.    5.Tobacco dependence  Smokes about 10 to 15 cigarettes a day, not ready to completely quit but she will try " gradually to cut down on her smoking, which she has been doing in the past few years.

## 2021-11-15 ENCOUNTER — TELEPHONE (OUTPATIENT)
Dept: MEDICAL GROUP | Facility: MEDICAL CENTER | Age: 81
End: 2021-11-15

## 2021-11-15 NOTE — TELEPHONE ENCOUNTER
Phone Number Called: [phone]    Call outcome: Spoke to patient regarding message below.    Message: This RN called Aby Parks, a 80 y.o. female, for a Blood Pressure Check.     - Home Monitoring: Blood Pressure at home ranging: Patient does not take BP at home  - Medications: Patient did take blood pressure medication(s) today before appointment. Last dose of blood pressure medication(s) was on 11/14/2021.   - Medication Compliance: Patient reports they are taking blood pressure medication(s) every day as prescribed by provider.    Patient does not have symptoms today.    Outcome: Education Provided: Education Provided: Patient educated that since she does not have a blood pressure monitor at home we can get her for an MA visit to check her blood pressure or we can get her in for her annual wellness. Patient scheduled for her annual on 12/06/2021 at 2:00PM.

## 2021-12-03 ENCOUNTER — TELEPHONE (OUTPATIENT)
Dept: MEDICAL GROUP | Facility: MEDICAL CENTER | Age: 81
End: 2021-12-03

## 2021-12-03 NOTE — TELEPHONE ENCOUNTER
ANNUAL WELLNESS VISIT PRE-VISIT PLANNING    Phone Number Called: [phone] (home)   Call outcome: Called patient. No answer. Left Voice Message.  Message: Appointment scheduled with , Monday, 12/06/2021 at 2:00 PM, [address], [id number]. Arrive 10-15 minutes early for check-in.    Left message for patient to call back regarding pre-visit planning. Please transfer call to [phone].    1.  Reviewed notes from the last office visit: Yes    2.  If any orders were ordered or intended to be done prior to visit (i.e. 6 mos follow-up), do we have results/consult notes or has patient scheduled?        •  Labs - Labs were not ordered at last office visit. Last Office Visit with PCP Provider Dr. Ryder, was on 10/05/2021.  Note: If patient appointment is for lab review and patient did not complete labs, check with provider if OK to reschedule patient until labs completed.       •  Imaging - Imaging was not ordered at last office visit.       •  Referrals - No referrals were ordered at last office visit.    3.  Immunizations were updated in Epic using Reconcile Outside Information activity? No, [employer] request failed: Registry unable to satisfactorily match patient for request.       •  Is patient due for Tdap? NO       •  Is patient due for Shingrix? NO    4.  Patient is due for the following Health Maintenance Topics:   Health Maintenance Due   Topic Date Due   • COVID-19 Vaccine (1) Never done   • Annual Wellness Visit  10/07/2020   • Annual Pulmonary Function Test / Spirometry  11/01/2021   • BONE DENSITY  12/19/2021       5.  Reviewed/Updated the following with patient:       •   Preferred Pharmacy? Not reviewed. Called patient and no answer.       •   Preferred Lab? Not reviewed. Called patient and no answer.       •   Preferred Communication? Not reviewed. Called patient and no answer.       •   Allergies? Not reviewed. Called patient and no answer.       •   Medications? Not reviewed. Called patient  and no answer.       •   Social History? Not reviewed. Called patient and no answer.       •   Family History (document living status of immediate family members and if + hx of  cancer, diabetes, hypertension, hyperlipidemia, heart attack, stroke) Not reviewed. Called patient and no answer.    6.  Care Team Updated:       •   DME Company (gait device, O2, CPAP, etc.): Not reviewed. Called patient and no answer.       •   Other Specialists (eye doctor, derm, GYN, cardiology, endo, etc): Called patient and no answer, not updated with patient's input.    7.  Patient was not advised: “This is a free wellness visit. The provider will screen for medical conditions to help you stay healthy. If you have other concerns to address you may be asked to discuss these at a separate visit or there may be an additional fee.”   Called patient. No answer.    8.  AHA (Puls[employer]) form printed for Provider? No, patient does not have any open alerts

## 2021-12-06 ENCOUNTER — OFFICE VISIT (OUTPATIENT)
Dept: MEDICAL GROUP | Facility: MEDICAL CENTER | Age: 81
End: 2021-12-06
Payer: MEDICARE

## 2021-12-06 VITALS
SYSTOLIC BLOOD PRESSURE: 150 MMHG | HEIGHT: 64 IN | OXYGEN SATURATION: 94 % | HEART RATE: 72 BPM | TEMPERATURE: 98.2 F | WEIGHT: 140.21 LBS | DIASTOLIC BLOOD PRESSURE: 88 MMHG | BODY MASS INDEX: 23.94 KG/M2

## 2021-12-06 DIAGNOSIS — M85.89 OSTEOPENIA OF MULTIPLE SITES: ICD-10-CM

## 2021-12-06 DIAGNOSIS — Z00.00 MEDICARE ANNUAL WELLNESS VISIT, SUBSEQUENT: ICD-10-CM

## 2021-12-06 DIAGNOSIS — Z85.118 HISTORY OF LUNG CANCER: ICD-10-CM

## 2021-12-06 DIAGNOSIS — I10 ESSENTIAL HYPERTENSION: ICD-10-CM

## 2021-12-06 DIAGNOSIS — J44.9 CHRONIC OBSTRUCTIVE PULMONARY DISEASE, UNSPECIFIED COPD TYPE (HCC): ICD-10-CM

## 2021-12-06 DIAGNOSIS — E78.5 DYSLIPIDEMIA: ICD-10-CM

## 2021-12-06 DIAGNOSIS — F17.200 TOBACCO DEPENDENCE: ICD-10-CM

## 2021-12-06 PROCEDURE — G0439 PPPS, SUBSEQ VISIT: HCPCS | Performed by: FAMILY MEDICINE

## 2021-12-06 RX ORDER — LOSARTAN POTASSIUM 50 MG/1
100 TABLET ORAL DAILY
Qty: 180 TABLET | Refills: 2 | Status: SHIPPED | OUTPATIENT
Start: 2021-12-06 | End: 2022-01-05 | Stop reason: SDUPTHER

## 2021-12-06 ASSESSMENT — ENCOUNTER SYMPTOMS: GENERAL WELL-BEING: EXCELLENT

## 2021-12-06 ASSESSMENT — ACTIVITIES OF DAILY LIVING (ADL): BATHING_REQUIRES_ASSISTANCE: 0

## 2021-12-06 ASSESSMENT — PATIENT HEALTH QUESTIONNAIRE - PHQ9: CLINICAL INTERPRETATION OF PHQ2 SCORE: 0

## 2021-12-06 ASSESSMENT — FIBROSIS 4 INDEX: FIB4 SCORE: 2.24

## 2021-12-06 NOTE — PROGRESS NOTES
Annual Health Assessment Questions:    1.  Are you currently engaging in any exercise or physical activity? No    2.  How would you describe your mood or emotional well-being today? good    3.  Have you had any falls in the last year? No    4.  Have you noticed any problems with your balance or had difficulty walking? No    5.  In the last six months have you experienced any leakage of urine? Yes    6. DPA/Advanced Directive: A advanced directive packet was offered to patient and patient declined.

## 2021-12-06 NOTE — PROGRESS NOTES
Chief Complaint   Patient presents with   • Annual Wellness Visit     Annual preventative exam         HPI:  Aby is a 81 y.o. here for Medicare Annual Wellness Visit    Patient Active Problem List    Diagnosis Date Noted   • Tobacco dependence 09/21/2020   • History of lung cancer 09/18/2017   • Chronic obstructive pulmonary disease (HCC) 05/01/2017   • Osteopenia 05/28/2014   • CATARACT    • Dyslipidemia        Current Outpatient Medications   Medication Sig Dispense Refill   • losartan (COZAAR) 50 MG Tab Take 1 Tablet by mouth every day. 90 Tablet 2   • simvastatin (ZOCOR) 40 MG Tab TAKE 1 TABLET BY MOUTH ONCE DAILY IN THE EVENING 100 tablet 3   • non-formulary med Inhale 1 L/min by mouth as needed (shortness of breath).     • calcium carbonate (CALCIUM CARBONATE) 500 MG Tab Take 500 mg by mouth 2 times a day, with meals.     • vitamin D (CHOLECALCIFEROL) 1000 UNIT Tab Take 1,000 Units by mouth 2 Times a Day.     • losartan (COZAAR) 25 MG Tab Take 1 tablet by mouth every day. (Patient not taking: Reported on 12/6/2021) 90 tablet 3     No current facility-administered medications for this visit.          Current supplements as per medication list.     Allergies: Nkda [no known drug allergy]    Current social contact/activities:      Is patient current with immunizations? Yes.    She  reports that she has been smoking cigarettes. She has a 63.00 pack-year smoking history. She has never used smokeless tobacco. She reports that she does not drink alcohol and does not use drugs.  Ready to quit: Not Answered  Counseling given: Not Answered  Comment: started at 15        DPA/Advanced directive: Patient does not have an Advanced Directive.  A packet and workshop information was given on Advanced Directives.    ROS:    Gait: Uses no assistive device   Ostomy: No   Other tubes: No   Amputations: No   Chronic oxygen use No   Last eye exam 2017  Wears hearing aids: No   : Denies any urinary leakage during the last 6  months      Screening:  Annual Health Assessment Questions:    1.  Are you currently engaging in any exercise or physical activity? No    2.  How would you describe your mood or emotional well-being today? good    3.  Have you had any falls in the last year? No    4.  Have you noticed any problems with your balance or had difficulty walking? No    5.  In the last six months have you experienced any leakage of urine? Yes    6. DPA/Advanced Directive: A advanced directive packet was offered to patient and patient declined.      Depression Screening    Little interest or pleasure in doing things?  0 - not at all  Feeling down, depressed, or hopeless? 0 - not at all  Patient Health Questionnaire Score: 0    If depressive symptoms identified deferred to follow up visit unless specifically addressed in assessment and plan.    Interpretation of PHQ-9 Total Score   Score Severity   1-4 No Depression   5-9 Mild Depression   10-14 Moderate Depression   15-19 Moderately Severe Depression   20-27 Severe Depression    Screening for Cognitive Impairment    Three Minute Recall (captain, garden, picture)  3/3    David clock face with all 12 numbers and set the hands to show 5 past 8.  Yes    If cognitive concerns identified, deferred for follow up unless specifically addressed in assessment and plan.    Fall Risk Assessment    Has the patient had two or more falls in the last year or any fall with injury in the last year?  No  If fall risk identified, deferred for follow up unless specifically addressed in assessment and plan.    Safety Assessment    Throw rugs on floor.  Yes  Handrails on all stairs.  Yes  Good lighting in all hallways.  Yes  Difficulty hearing.  Yes  Patient counseled about all safety risks that were identified.    Functional Assessment ADLs    Are there any barriers preventing you from cooking for yourself or meeting nutritional needs?  No.    Are there any barriers preventing you from driving safely or obtaining  transportation?  No.    Are there any barriers preventing you from using a telephone or calling for help?  No.    Are there any barriers preventing you from shopping?  No.    Are there any barriers preventing you from taking care of your own finances?  No.    Are there any barriers preventing you from managing your medications?  No.    Are there any barriers preventing you from showering, bathing or dressing yourself?  No.    Are you currently engaging in any exercise or physical activity?  No.     What is your perception of your health?  Excellent.    Health Maintenance Summary          Overdue - COVID-19 Vaccine (1) Overdue - never done    No completion history exists for this topic.          Ordered - Annual Pulmonary Function Test / Spirometry (Yearly) Ordered on 12/6/2021 11/01/2020  PFT DICTATED RESULTS    05/30/2017  PFT DICTATED RESULTS          BONE DENSITY (Every 5 Years) Due soon on 12/19/2021 12/19/2016  DS-BONE DENSITY STUDY (DEXA)    12/12/2013  DS-BONE DENSITY STUDY (DEXA)    08/09/2011  DS-BONE DENSITY STUDY (DEXA)          Postponed - IMM ZOSTER VACCINES (1 of 2) Postponed until 10/11/2034    No completion history exists for this topic.          Postponed - IMM INFLUENZA (1) Postponed until 10/12/2034    No completion history exists for this topic.          Postponed - IMM PNEUMOCOCCAL VACCINE: 65+ Years (1 of 2 - PPSV23) Postponed until 10/9/2035    No completion history exists for this topic.          Annual Wellness Visit (Every 366 Days) Next due on 12/7/2022 12/06/2021  Visit Dx: Medicare annual wellness visit, subsequent    12/06/2021  Subsequent Annual Wellness Visit - Includes PPPS ()    10/07/2019  Visit Dx: Medicare annual wellness visit, subsequent    10/19/2018  Visit Dx: Medicare annual wellness visit, subsequent    05/01/2017  Visit Dx: Medicare annual wellness visit, subsequent          COLORECTAL CANCER SCREENING (COLONOSCOPY - Every 10 Years) Next due on 2/26/2023     02/26/2013  COLONOSCOPY (Patient Declined)          IMM HEP B VACCINE (Series Information) Aged Out    No completion history exists for this topic.          IMM MENINGOCOCCAL VACCINE (MCV4) (Series Information) Aged Out    No completion history exists for this topic.          Discontinued - MAMMOGRAM  Discontinued    01/18/2017  MA-DIAGNOSTIC DIGITAL MAMMO-UNILAT LEFT    12/19/2016  MA-MAMMO SCREENING BILAT W/ALCIRA W/CAD    09/23/2015  MA-SCREEN MAMMO W/CAD-BILAT    03/11/2013  MA-SCREENING MAMMOGRAM W/ CAD    01/14/2011  MA-SCREEING MAMMOGRAM W/ CAD    Only the first 5 history entries have been loaded, but more history exists.          Discontinued - IMM DTaP/Tdap/Td Vaccine  Discontinued    No completion history exists for this topic.                Patient Care Team:  Pilar Ryder M.D. as PCP - General (Geriatrics)  Dejon Cadena M.D. as Consulting Physician (Ophthalmology)  John H Ganser, M.D. as Consulting Physician (Surgery)  Rafia Oropeza as [facility]     Social History     Tobacco Use   • Smoking status: Current Every Day Smoker     Packs/day: 1.00     Years: 63.00     Pack years: 63.00     Types: Cigarettes   • Smokeless tobacco: Never Used   • Tobacco comment: started at 15   Vaping Use   • Vaping Use: Never used   Substance Use Topics   • Alcohol use: No     Alcohol/week: 0.0 oz   • Drug use: No     Family History   Problem Relation Age of Onset   • Lung Disease Father         emphazema/Smoker   • Heart Attack Father    • Arthritis Mother    • Lung Disease Sister         COPD/Smoker   • Diabetes Sister    • No Known Problems Brother    • Heart Disease Brother    • No Known Problems Brother    • No Known Problems Son    • No Known Problems Maternal Grandmother    • Cancer Maternal Grandfather         throat cancer/ Pipe smoker   • Dementia Paternal Grandmother    • Cancer Paternal Grandfather    • Breast Cancer Paternal Aunt    • Cancer Paternal Aunt      She  has a  past medical history of Arthritis (09/13/2017), Bilateral breast cysts, CATARACT (09/13/2017), Emphysema of lung (HCC) (09/13/2017), Hyperlipidemia, MEDICAL HOME (11/9/2012), Pneumonia (4-2017), Tobacco abuse, and Urinary incontinence.   Past Surgical History:   Procedure Laterality Date   • THORACOSCOPY Right 9/18/2017    Procedure: THORACOSCOPY- LOBECTOMY LOWER;  Surgeon: John H Ganser, M.D.;  Location: SURGERY [facility];  Service: General   • NODE DISSECTION Right 9/18/2017    Procedure: NODE DISSECTION;  Surgeon: John H Ganser, M.D.;  Location: SURGERY [facility];  Service: General   • ABDOMINAL HYSTERECTOMY TOTAL     • CATARACT EXTRACTION WITH IOL Bilateral    • OTHER      rita cataracts           Exam:     There were no vitals taken for this visit. There is no height or weight on file to calculate BMI.    Hearing .    Dentition   Alert, oriented in no acute distress.  Eye contact is good, speech goal directed, affect calm      Assessment and Plan. The following treatment and monitoring plan is recommended:    81 y.o. female     1. Medicare annual wellness visit, subsequent  Preventive measures and chronic medical issues reviewed.  - Subsequent Annual Wellness Visit - Includes PPPS ()    2. Essential hypertension  Elevated blood pressure,   Patient is currently on losartan  50 mg daily, advised to increase the dose to 100 mg daily.    - Subsequent Annual Wellness Visit - Includes PPPS ()    3. Dyslipidemia  He has been tolerating the statin. Denies muscle pain LFTs has been normal  Continue on simvastatin 40 mg daily    - Subsequent Annual Wellness Visit - Includes PPPS ()    4. Chronic obstructive pulmonary disease, unspecified COPD type (HCC)  Patient stated that she is mostly asymptomatic. However she has been having patient has been having shortness of breath on stairs.  Otherwise she has been asymptomatic.  Continue albuterol as needed.  She has been having normal oxygen  saturation.  Not on oxygen.  Smoking cessation discussed.  Still smokes about 10 to 15 cigarettes a day, not ready to completely quit but she will continue gradually cutting down number of cigarettes she smokes.  Patient is due for pulmonary function test    - Subsequent Annual Wellness Visit - Includes PPPS ()  - PULMONARY FUNCTION TESTS -Test requested: Complete Pulmonary Function Test; Future    5. History of lung cancer  Patient underwent right lower lobectomy in 9/2017, did not need further treatment.  Has been asymptomatic since then.  However she still smokes( 10-15 cig/day), does not want to quit    - Subsequent Annual Wellness Visit - Includes PPPS ()    6. Tobacco dependence  Patient smokes( 10-15 cig/day) for more than 60 years, smoking cessation discussed, does not want to quit    - Subsequent Annual Wellness Visit - Includes PPPS ()    7. Osteopenia of multiple sites  Last bone density showed osteopenia of both lumbar spine and left femur.  Patient is counseled importance of taking calcium and vitamin D     - Subsequent Annual Wellness Visit - Includes PPPS ()                     Services suggested: No services needed at this time  Health Care Screening recommendations as per orders if indicated.  Referrals offered: PT/OT/Nutrition counseling/Behavioral Health/Smoking cessation as per orders if indicated.    Discussion today about general wellness and lifestyle habits:    · Prevent falls and reduce trip hazards; Cautioned about securing or removing rugs.  · Have a working fire alarm and carbon monoxide detector;   · Engage in regular physical activity and social activities       Follow-up: No follow-ups on file.

## 2021-12-20 ENCOUNTER — TELEPHONE (OUTPATIENT)
Dept: MEDICAL GROUP | Facility: MEDICAL CENTER | Age: 81
End: 2021-12-20

## 2021-12-20 NOTE — TELEPHONE ENCOUNTER
Phone Number Called: [phone]    Call outcome: Spoke to patient regarding message below.    Message: This RN called Cheli Parks, a 81 y.o. female, for a Blood Pressure Check.    - Home Monitoring: Blood Pressure at home ranging: Patient does not take BP at home  - Medications: Patient did take blood pressure medication(s) today before appointment. Last dose of blood pressure medication(s) was on 12/20/2021.   - Medication Compliance: Patient reports they are taking blood pressure medication(s) every day as prescribed by provider.    Patient does not have symptoms today.    Outcome: Education Provided: Patient stated that she does not check her blood pressure at home and she is not going to spend the money to monitor it at home. Patient not symptomatic and has not been symptomatic. Patient then asked this RN why she needed to get her PFTs done again this year. Patient educated that since she has COPD, they are recommended to be preformed yearly to ensure that her COPD is not getting worse. Patient educated on the importance of completing them. Patient still refusing at this time and would like to get both her BP checked and talk about the PFTs at her next appointment in March.

## 2022-01-04 ENCOUNTER — NON-PROVIDER VISIT (OUTPATIENT)
Dept: MEDICAL GROUP | Facility: MEDICAL CENTER | Age: 82
End: 2022-01-04
Payer: MEDICAID

## 2022-01-04 VITALS — DIASTOLIC BLOOD PRESSURE: 90 MMHG | HEART RATE: 87 BPM | SYSTOLIC BLOOD PRESSURE: 150 MMHG

## 2022-01-04 NOTE — NON-PROVIDER
Cheli Parks is a 81 y.o. female here for a non-provider visit for  Blood pressure check     There were no vitals filed for this visit.  If abnormal, was the Registered Nurse (office provider if RN is unavailable) notified today? Yes    Routed to PCP? Yes

## 2022-01-05 DIAGNOSIS — I10 ESSENTIAL HYPERTENSION: ICD-10-CM

## 2022-01-05 RX ORDER — LOSARTAN POTASSIUM 50 MG/1
100 TABLET ORAL DAILY
Qty: 200 TABLET | Refills: 2 | Status: SHIPPED | OUTPATIENT
Start: 2022-01-05 | End: 2022-04-04

## 2022-01-18 ENCOUNTER — TELEPHONE (OUTPATIENT)
Dept: HEALTH INFORMATION MANAGEMENT | Facility: OTHER | Age: 82
End: 2022-01-18

## 2022-03-18 ENCOUNTER — TELEPHONE (OUTPATIENT)
Dept: MEDICAL GROUP | Facility: MEDICAL CENTER | Age: 82
End: 2022-03-18
Payer: MEDICARE

## 2022-03-18 NOTE — TELEPHONE ENCOUNTER
No blood work is needed for the next appointment.  We will need to repeat your blood work in September 2022

## 2022-03-23 ENCOUNTER — OFFICE VISIT (OUTPATIENT)
Dept: MEDICAL GROUP | Facility: MEDICAL CENTER | Age: 82
End: 2022-03-23
Payer: MEDICARE

## 2022-03-23 VITALS
HEIGHT: 64 IN | OXYGEN SATURATION: 93 % | HEART RATE: 76 BPM | DIASTOLIC BLOOD PRESSURE: 80 MMHG | BODY MASS INDEX: 23.6 KG/M2 | WEIGHT: 138.23 LBS | SYSTOLIC BLOOD PRESSURE: 140 MMHG | RESPIRATION RATE: 16 BRPM | TEMPERATURE: 98.1 F

## 2022-03-23 DIAGNOSIS — E78.2 MIXED HYPERLIPIDEMIA: ICD-10-CM

## 2022-03-23 DIAGNOSIS — I10 ESSENTIAL HYPERTENSION: ICD-10-CM

## 2022-03-23 DIAGNOSIS — Z85.118 HISTORY OF LUNG CANCER: ICD-10-CM

## 2022-03-23 PROCEDURE — 99214 OFFICE O/P EST MOD 30 MIN: CPT | Performed by: FAMILY MEDICINE

## 2022-03-23 ASSESSMENT — PATIENT HEALTH QUESTIONNAIRE - PHQ9: CLINICAL INTERPRETATION OF PHQ2 SCORE: 0

## 2022-03-23 ASSESSMENT — FIBROSIS 4 INDEX: FIB4 SCORE: 2.24

## 2022-03-23 NOTE — PROGRESS NOTES
CC: Hypertension, hyperlipidemia, history of lung cancer    HPI:   Cheli presents today to discuss the following:    Essential hypertension  Patient's blood pressure in the clinic today is fine.  However she reported that her blood pressure has been fluctuating sometimes above 160/90.  Denies headache, chest pain, shortness of breath.  She has been on losartan 50 mg 2 tablets a day.    Mixed hyperlipidemia  She has been tolerating the statin. Denies muscle pain LFTs has been normal.  She has been on simvastatin 40 mg daily.  No history of diabetes, CAD, or stroke.    History of lung cancer  Patient underwent thoracoscopic right lower lobectomy, lymph node dissection in September 2017, and she has been asymptomatic since then.  Denies any cough, or shortness of breath.      Patient Active Problem List    Diagnosis Date Noted   • Mixed hyperlipidemia 03/23/2022   • Tobacco dependence 09/21/2020   • History of lung cancer 09/18/2017   • Chronic obstructive pulmonary disease (HCC) 05/01/2017   • Osteopenia 05/28/2014   • Essential hypertension 03/23/2010   • CATARACT    • Dyslipidemia        Current Outpatient Medications   Medication Sig Dispense Refill   • losartan (COZAAR) 50 MG Tab Take 2 Tablets by mouth every day. 200 Tablet 2   • simvastatin (ZOCOR) 40 MG Tab TAKE 1 TABLET BY MOUTH ONCE DAILY IN THE EVENING 100 tablet 3   • non-formulary med Inhale 1 L/min by mouth as needed (shortness of breath).     • calcium carbonate (CALCIUM CARBONATE) 500 MG Tab Take 500 mg by mouth 2 times a day, with meals.     • vitamin D (CHOLECALCIFEROL) 1000 UNIT Tab Take 1,000 Units by mouth 2 Times a Day.       No current facility-administered medications for this visit.         Allergies as of 03/23/2022 - Reviewed 03/23/2022   Allergen Reaction Noted   • Nkda [no known drug allergy]  09/22/2009        ROS: Denies any chest pain, Shortness of breath, Changes bowel or bladder, Lower extremity edema.    Physical Exam:  /80 (BP  "Location: Right arm, Patient Position: Sitting, BP Cuff Size: Adult)   Pulse 76   Temp 36.7 °C (98.1 °F) (Temporal)   Resp 16   Ht 1.626 m (5' 4\")   Wt 62.7 kg (138 lb 3.7 oz)   SpO2 93%   BMI 23.73 kg/m²   Gen.: Well-developed, well-nourished, no apparent distress,pleasant and cooperative with the examination  Skin:  Warm and dry with good turgor. No rashes or suspicious lesions in visible areas  HEENT:Sinuses nontender with palpation, TMs clear, nares patent with pink mucosa and clear rhinorrhea,no septal deviation ,polyps or lesions. lips without lesions, oropharynx clear.  Neck: Trachea midline,no masses or adenopathy. No JVD.  Cor: Regular rate and rhythm without murmur, gallop or rub.  Lungs: Respirations unlabored.Clear to auscultation with equal breath sounds bilaterally. No wheezes, rhonchi.  Extremities: No cyanosis, clubbing or edema.            Assessment and Plan.   81 y.o. female     1. Essential hypertension  Blood pressure in the clinic today is fine.  However patient reported that her blood pressure has been fluctuating sometimes above 160/90.  For now continue on losartan 100 mg daily  Patient advised to check her blood pressure 3 times a day for a week and send it to me for reevaluation    - CBC WITH DIFFERENTIAL; Future  - Comp Metabolic Panel; Future  - Lipid Profile; Future  - TSH; Future    2. Mixed hyperlipidemia  She has been tolerating the statin. Denies muscle pain LFTs has been normal  Continue on simvastatin 40 mg daily    - Lipid Profile; Future  - TSH; Future    3. History of lung cancer  Status post thoracoscopic right lower lobectomy, lymph node dissection in September 2017.  Has been asymptomatic since then.  "

## 2022-04-04 ENCOUNTER — TELEPHONE (OUTPATIENT)
Dept: MEDICAL GROUP | Facility: MEDICAL CENTER | Age: 82
End: 2022-04-04
Payer: MEDICARE

## 2022-04-04 DIAGNOSIS — I10 ESSENTIAL HYPERTENSION: ICD-10-CM

## 2022-04-04 RX ORDER — AMLODIPINE BESYLATE 10 MG/1
10 TABLET ORAL DAILY
Qty: 90 TABLET | Refills: 2 | Status: SHIPPED | OUTPATIENT
Start: 2022-04-04 | End: 2023-01-25

## 2022-04-04 RX ORDER — LOSARTAN POTASSIUM 100 MG/1
100 TABLET ORAL DAILY
Qty: 90 TABLET | Refills: 2 | Status: SHIPPED | OUTPATIENT
Start: 2022-04-04 | End: 2023-01-25

## 2022-04-04 NOTE — TELEPHONE ENCOUNTER
Losartan 100 mg sent to the pharmacy.  I also added amlodipine 10 mg to be taken once a day.  Patient needs to check her blood pressure again twice a day for a week and send it to me for reevaluation.

## 2022-04-04 NOTE — TELEPHONE ENCOUNTER
Patient came in to the office , she brought a B/P log for 1 week....  with a reading of 194/105 in the morning as the highest and 146/ 81 as the lowest reading at night... patient states she is currently taking Losartan 50MG twice a day and would like to know if it would be possible for you to prescribe losartan 100MG instead to avoid taking 2 pills... also she would like an advice on what she should do? Or if you would be adding an additional B/P medication?                      * Blood Pressure Readings left on your desk for review.

## 2022-04-21 DIAGNOSIS — E78.5 DYSLIPIDEMIA: ICD-10-CM

## 2022-04-21 RX ORDER — SIMVASTATIN 40 MG
40 TABLET ORAL EVERY EVENING
Qty: 100 TABLET | Refills: 3 | Status: SHIPPED | OUTPATIENT
Start: 2022-04-21 | End: 2023-05-01

## 2022-09-24 ENCOUNTER — HOSPITAL ENCOUNTER (OUTPATIENT)
Dept: LAB | Facility: MEDICAL CENTER | Age: 82
End: 2022-09-24
Attending: FAMILY MEDICINE
Payer: MEDICARE

## 2022-09-24 DIAGNOSIS — I10 ESSENTIAL HYPERTENSION: ICD-10-CM

## 2022-09-24 DIAGNOSIS — E78.2 MIXED HYPERLIPIDEMIA: ICD-10-CM

## 2022-09-24 LAB
ALBUMIN SERPL BCP-MCNC: 4.6 G/DL (ref 3.2–4.9)
ALBUMIN/GLOB SERPL: 1.8 G/DL
ALP SERPL-CCNC: 78 U/L (ref 30–99)
ALT SERPL-CCNC: 13 U/L (ref 2–50)
ANION GAP SERPL CALC-SCNC: 12 MMOL/L (ref 7–16)
AST SERPL-CCNC: 18 U/L (ref 12–45)
BASOPHILS # BLD AUTO: 0.6 % (ref 0–1.8)
BASOPHILS # BLD: 0.04 K/UL (ref 0–0.12)
BILIRUB SERPL-MCNC: 0.6 MG/DL (ref 0.1–1.5)
BUN SERPL-MCNC: 17 MG/DL (ref 8–22)
CALCIUM SERPL-MCNC: 9.2 MG/DL (ref 8.5–10.5)
CHLORIDE SERPL-SCNC: 106 MMOL/L (ref 96–112)
CHOLEST SERPL-MCNC: 164 MG/DL (ref 100–199)
CO2 SERPL-SCNC: 23 MMOL/L (ref 20–33)
CREAT SERPL-MCNC: 0.69 MG/DL (ref 0.5–1.4)
EOSINOPHIL # BLD AUTO: 0.16 K/UL (ref 0–0.51)
EOSINOPHIL NFR BLD: 2.2 % (ref 0–6.9)
ERYTHROCYTE [DISTWIDTH] IN BLOOD BY AUTOMATED COUNT: 48.8 FL (ref 35.9–50)
GFR SERPLBLD CREATININE-BSD FMLA CKD-EPI: 86 ML/MIN/1.73 M 2
GLOBULIN SER CALC-MCNC: 2.5 G/DL (ref 1.9–3.5)
GLUCOSE SERPL-MCNC: 106 MG/DL (ref 65–99)
HCT VFR BLD AUTO: 44.5 % (ref 37–47)
HDLC SERPL-MCNC: 55 MG/DL
HGB BLD-MCNC: 15 G/DL (ref 12–16)
IMM GRANULOCYTES # BLD AUTO: 0.01 K/UL (ref 0–0.11)
IMM GRANULOCYTES NFR BLD AUTO: 0.1 % (ref 0–0.9)
LDLC SERPL CALC-MCNC: 85 MG/DL
LYMPHOCYTES # BLD AUTO: 1.75 K/UL (ref 1–4.8)
LYMPHOCYTES NFR BLD: 24.4 % (ref 22–41)
MCH RBC QN AUTO: 31.4 PG (ref 27–33)
MCHC RBC AUTO-ENTMCNC: 33.7 G/DL (ref 33.6–35)
MCV RBC AUTO: 93.3 FL (ref 81.4–97.8)
MONOCYTES # BLD AUTO: 0.56 K/UL (ref 0–0.85)
MONOCYTES NFR BLD AUTO: 7.8 % (ref 0–13.4)
NEUTROPHILS # BLD AUTO: 4.64 K/UL (ref 2–7.15)
NEUTROPHILS NFR BLD: 64.9 % (ref 44–72)
NRBC # BLD AUTO: 0 K/UL
NRBC BLD-RTO: 0 /100 WBC
PLATELET # BLD AUTO: 194 K/UL (ref 164–446)
PMV BLD AUTO: 12.5 FL (ref 9–12.9)
POTASSIUM SERPL-SCNC: 3.9 MMOL/L (ref 3.6–5.5)
PROT SERPL-MCNC: 7.1 G/DL (ref 6–8.2)
RBC # BLD AUTO: 4.77 M/UL (ref 4.2–5.4)
SODIUM SERPL-SCNC: 141 MMOL/L (ref 135–145)
TRIGL SERPL-MCNC: 121 MG/DL (ref 0–149)
TSH SERPL DL<=0.005 MIU/L-ACNC: 1.89 UIU/ML (ref 0.38–5.33)
WBC # BLD AUTO: 7.2 K/UL (ref 4.8–10.8)

## 2022-09-24 PROCEDURE — 80061 LIPID PANEL: CPT

## 2022-09-24 PROCEDURE — 36415 COLL VENOUS BLD VENIPUNCTURE: CPT

## 2022-09-24 PROCEDURE — 80053 COMPREHEN METABOLIC PANEL: CPT

## 2022-09-24 PROCEDURE — 84443 ASSAY THYROID STIM HORMONE: CPT

## 2022-09-24 PROCEDURE — 85025 COMPLETE CBC W/AUTO DIFF WBC: CPT

## 2022-09-28 ENCOUNTER — OFFICE VISIT (OUTPATIENT)
Dept: MEDICAL GROUP | Facility: MEDICAL CENTER | Age: 82
End: 2022-09-28
Payer: MEDICARE

## 2022-09-28 VITALS
HEART RATE: 93 BPM | BODY MASS INDEX: 22.88 KG/M2 | RESPIRATION RATE: 16 BRPM | WEIGHT: 134 LBS | OXYGEN SATURATION: 94 % | SYSTOLIC BLOOD PRESSURE: 126 MMHG | HEIGHT: 64 IN | DIASTOLIC BLOOD PRESSURE: 74 MMHG | TEMPERATURE: 98.5 F

## 2022-09-28 DIAGNOSIS — Z85.118 HISTORY OF LUNG CANCER: ICD-10-CM

## 2022-09-28 DIAGNOSIS — I10 ESSENTIAL HYPERTENSION: ICD-10-CM

## 2022-09-28 DIAGNOSIS — F17.200 TOBACCO DEPENDENCE: ICD-10-CM

## 2022-09-28 DIAGNOSIS — E78.5 DYSLIPIDEMIA: ICD-10-CM

## 2022-09-28 DIAGNOSIS — J44.9 CHRONIC OBSTRUCTIVE PULMONARY DISEASE, UNSPECIFIED COPD TYPE (HCC): ICD-10-CM

## 2022-09-28 PROCEDURE — 99214 OFFICE O/P EST MOD 30 MIN: CPT | Performed by: FAMILY MEDICINE

## 2022-09-28 ASSESSMENT — FIBROSIS 4 INDEX: FIB4 SCORE: 2.11

## 2022-09-28 NOTE — PROGRESS NOTES
CC: COPD, history of lung cancer, hyperlipidemia , hypertension, tobacco dependence    HPI:   Cheli presents today to discuss the following     Chronic obstructive pulmonary disease, unspecified COPD type (HCC)/ History of lung cancer  Patient currently denies any cough or shortness of breath.  Pulmonary function test was done in 2020 showed:   FINDINGS:  1.  Acceptable and reproducible.  2.  FEV1 1.96 liters (103%), FVC 2.73 liters (109%), ratio 72%.  3.  Flow volume loops consistent with peripheral airway obstruction.  4.  TLC 7.05 liters (142%).  5.  DLCO 12.19 mL/min/mmHg (66%).     IMPRESSION:  Normal spirometry.  No response to bronchodilators, evidence of   air trapping, hyperinflation, and mild reduction in gas transfer.  Consistent   with underlying diagnosis of chronic obstructive pulmonary disease.       She takes on albuterol as needed only.  She did not take it for a while and she has been asymptomatic  Patient with history of lung cancer status post lobectomy in 2017, will send patient to have surveillance chest CT.    Essential hypertension  Blood pressure has been adequately controlled on losartan 100 mg daily, and amlodipine 10 mg daily.  No side effects    Dyslipidemia  She has been tolerating the statin. Denies muscle pain LFTs has been normal.  Patient has been on simvastatin 40 mg daily    Tobacco dependence  She smokes about 10 cigarettes a day, she used to smoke a pack a day since she was 12 years old.  Discussed smoking cessation, patient will continue cut down on her smoking, her plan is to quit smoking.  Patient with history of COPD currently asymptomatic, rarely takes albuterol.            Patient Active Problem List    Diagnosis Date Noted    Mixed hyperlipidemia 03/23/2022    Tobacco dependence 09/21/2020    History of lung cancer 09/18/2017    Chronic obstructive pulmonary disease (HCC) 05/01/2017    Osteopenia 05/28/2014    Essential hypertension 03/23/2010    CATARACT     Dyslipidemia   "      Current Outpatient Medications   Medication Sig Dispense Refill    simvastatin (ZOCOR) 40 MG Tab Take 1 Tablet by mouth every evening. 100 Tablet 3    losartan (COZAAR) 100 MG Tab Take 1 Tablet by mouth every day. 90 Tablet 2    amLODIPine (NORVASC) 10 MG Tab Take 1 Tablet by mouth every day. 90 Tablet 2    non-formulary med Inhale 1 L/min by mouth as needed (shortness of breath).      calcium carbonate (CALCIUM CARBONATE) 500 MG Tab Take 500 mg by mouth 2 times a day, with meals.      vitamin D (CHOLECALCIFEROL) 1000 UNIT Tab Take 1,000 Units by mouth 2 Times a Day.       No current facility-administered medications for this visit.         Allergies as of 09/28/2022 - Reviewed 09/28/2022   Allergen Reaction Noted    Nkda [no known drug allergy]  09/22/2009        ROS: Denies any chest pain, Shortness of breath, Changes bowel or bladder, Lower extremity edema.    Physical Exam:  /74 (BP Location: Right arm, Patient Position: Sitting, BP Cuff Size: Adult)   Pulse 93   Temp 36.9 °C (98.5 °F) (Temporal)   Resp 16   Ht 1.626 m (5' 4\")   Wt 60.8 kg (134 lb)   SpO2 94%   BMI 23.00 kg/m²   Gen.: Well-developed, well-nourished, no apparent distress,pleasant and cooperative with the examination  Skin:  Warm and dry with good turgor. No rashes or suspicious lesions in visible areas  HEENT:Sinuses nontender with palpation, TMs clear, nares patent with pink mucosa and clear rhinorrhea,no septal deviation ,polyps or lesions. lips without lesions, oropharynx clear.  Neck: Trachea midline,no masses or adenopathy. No JVD.  Cor: Regular rate and rhythm without murmur, gallop or rub.  Lungs: Respirations unlabored.Decreased breath sounds bilaterally. No wheezes, rhonchi.  Extremities: No cyanosis, clubbing or edema.          Assessment and Plan.   81 y.o. female     1. Chronic obstructive pulmonary disease, unspecified COPD type (HCC)  Stable.  Asymptomatic.  Continue on albuterol as needed only  Patient with " history of lung cancer status post lobectomy, will send patient to have surveillance chest CT.    - CT-CHEST (THORAX) W/O; Future    2. Essential hypertension  Controlled.  Continue losartan 100 mg daily, and amlodipine 10 mg daily.    3. Dyslipidemia  She has been tolerating the statin. Denies muscle pain LFTs has been normal  Continue on simvastatin 40 mg daily    4. Tobacco dependence  Smokes about 10 cigarettes a day, she used to smoke a pack a day since she was 12 years old.  Discussed smoking cessation, patient will continue cut down on her smoking, her plan is to quit smoking    5. History of lung cancer  Stable.  Status post lobectomy in 2017.  Will send patient to have surveillance chest CT.    - CT-CHEST (THORAX) W/O; Future

## 2022-10-18 ENCOUNTER — HOSPITAL ENCOUNTER (OUTPATIENT)
Dept: RADIOLOGY | Facility: MEDICAL CENTER | Age: 82
End: 2022-10-18
Attending: FAMILY MEDICINE
Payer: MEDICARE

## 2022-10-18 DIAGNOSIS — Z85.118 HISTORY OF LUNG CANCER: ICD-10-CM

## 2022-10-18 DIAGNOSIS — J44.9 CHRONIC OBSTRUCTIVE PULMONARY DISEASE, UNSPECIFIED COPD TYPE (HCC): ICD-10-CM

## 2022-10-18 PROCEDURE — 71250 CT THORAX DX C-: CPT

## 2023-01-25 DIAGNOSIS — I10 ESSENTIAL HYPERTENSION: ICD-10-CM

## 2023-01-25 RX ORDER — LOSARTAN POTASSIUM 100 MG/1
TABLET ORAL
Qty: 100 TABLET | Refills: 0 | Status: SHIPPED | OUTPATIENT
Start: 2023-01-25 | End: 2023-05-01

## 2023-01-25 RX ORDER — AMLODIPINE BESYLATE 10 MG/1
TABLET ORAL
Qty: 90 TABLET | Refills: 0 | Status: SHIPPED | OUTPATIENT
Start: 2023-01-25 | End: 2023-05-01

## 2023-03-28 ENCOUNTER — OFFICE VISIT (OUTPATIENT)
Dept: MEDICAL GROUP | Facility: MEDICAL CENTER | Age: 83
End: 2023-03-28
Payer: MEDICARE

## 2023-03-28 VITALS
HEART RATE: 92 BPM | TEMPERATURE: 98.2 F | OXYGEN SATURATION: 92 % | HEIGHT: 64 IN | BODY MASS INDEX: 23.39 KG/M2 | RESPIRATION RATE: 16 BRPM | WEIGHT: 137 LBS | SYSTOLIC BLOOD PRESSURE: 130 MMHG | DIASTOLIC BLOOD PRESSURE: 80 MMHG

## 2023-03-28 DIAGNOSIS — E78.2 MIXED HYPERLIPIDEMIA: ICD-10-CM

## 2023-03-28 DIAGNOSIS — I10 ESSENTIAL HYPERTENSION: ICD-10-CM

## 2023-03-28 DIAGNOSIS — J44.9 CHRONIC OBSTRUCTIVE PULMONARY DISEASE, UNSPECIFIED COPD TYPE (HCC): ICD-10-CM

## 2023-03-28 DIAGNOSIS — F17.200 TOBACCO DEPENDENCE: ICD-10-CM

## 2023-03-28 PROCEDURE — 99214 OFFICE O/P EST MOD 30 MIN: CPT | Performed by: FAMILY MEDICINE

## 2023-03-28 ASSESSMENT — PATIENT HEALTH QUESTIONNAIRE - PHQ9: CLINICAL INTERPRETATION OF PHQ2 SCORE: 0

## 2023-03-28 ASSESSMENT — FIBROSIS 4 INDEX: FIB4 SCORE: 2.14

## 2023-03-28 NOTE — PROGRESS NOTES
CC: Hypertension, hyperlipidemia, COPD, tobacco dependence    HPI:   Cheli presents today to discuss the following:    Essential hypertension  Blood pressure has been adequately controlled on losartan 100 mg daily, and amlodipine 10 mg daily.  No side effects     Dyslipidemia  She has been tolerating the statin. Denies muscle pain LFTs has been normal.  Patient has been on simvastatin 40 mg daily     Chronic obstructive pulmonary disease, unspecified COPD type (HCC)/ Tobacco dependence  Patient currently denies any cough or shortness of breath.  Patient has been on albuterol as needed. She did not take it for a while and she has been asymptomatic.  Patient smokes about 10 cigarettes a day, she used to smoke a pack a day since she was 12 years old.  Discussed smoking cessation, patient stated that she cut down to 10 cigarettes a day but she cannot quit smoking.    Patient Active Problem List    Diagnosis Date Noted    Mixed hyperlipidemia 03/23/2022    Tobacco dependence 09/21/2020    History of lung cancer 09/18/2017    Chronic obstructive pulmonary disease (HCC) 05/01/2017    Osteopenia 05/28/2014    Essential hypertension 03/23/2010    CATARACT     Dyslipidemia        Current Outpatient Medications   Medication Sig Dispense Refill    amLODIPine (NORVASC) 10 MG Tab Take 1 tablet by mouth once daily 90 Tablet 0    losartan (COZAAR) 100 MG Tab Take 1 tablet by mouth once daily 100 Tablet 0    simvastatin (ZOCOR) 40 MG Tab Take 1 Tablet by mouth every evening. 100 Tablet 3    non-formulary med Inhale 1 L/min by mouth as needed (shortness of breath).      calcium carbonate (CALCIUM CARBONATE) 500 MG Tab Take 500 mg by mouth 2 times a day, with meals.      vitamin D (CHOLECALCIFEROL) 1000 UNIT Tab Take 1,000 Units by mouth 2 Times a Day.       No current facility-administered medications for this visit.         Allergies as of 03/28/2023 - Reviewed 03/28/2023   Allergen Reaction Noted    Nkda [no known drug allergy]   "09/22/2009        ROS: Denies any chest pain, Shortness of breath, Changes bowel or bladder, Lower extremity edema.    Physical Exam:  /80 (BP Location: Right arm, Patient Position: Sitting, BP Cuff Size: Adult)   Pulse (!) 112   Temp 36.8 °C (98.2 °F) (Temporal)   Resp 16   Ht 1.626 m (5' 4\")   Wt 62.1 kg (137 lb)   SpO2 92%   BMI 23.52 kg/m²   Gen.: Well-developed, well-nourished, no apparent distress,pleasant and cooperative with the examination  Skin:  Warm and dry with good turgor. No rashes or suspicious lesions in visible areas  HEENT:Sinuses nontender with palpation, TMs clear, nares patent with pink mucosa and clear rhinorrhea,no septal deviation ,polyps or lesions. lips without lesions, oropharynx clear.  Neck: Trachea midline,no masses or adenopathy. No JVD.  Cor: Regular rate and rhythm without murmur, gallop or rub.  Lungs: Respirations unlabored.Clear to auscultation with equal breath sounds bilaterally. No wheezes, rhonchi.  Extremities: No cyanosis, clubbing or edema.          Assessment and Plan.   82 y.o. female     1. Essential hypertension  Controlled.  Continue on losartan 100 mg daily, and amlodipine 10 mg daily.    2. Mixed hyperlipidemia  He has been tolerating the statin. Denies muscle pain LFTs has been normal  Continue rosuvastatin 40 mg daily    3. Chronic obstructive pulmonary disease, unspecified COPD type (HCC)  Stable.  Mostly asymptomatic.  Rarely takes albuterol as needed    4. Tobacco dependence  Still smokes about 10 cigarettes a day, not ready to quit although the patient has history of lung cancer status post surgical removal in 2017                    "

## 2023-03-29 ENCOUNTER — HOSPITAL ENCOUNTER (OUTPATIENT)
Dept: LAB | Facility: MEDICAL CENTER | Age: 83
End: 2023-03-29
Attending: FAMILY MEDICINE
Payer: MEDICARE

## 2023-03-29 DIAGNOSIS — I10 ESSENTIAL HYPERTENSION: ICD-10-CM

## 2023-03-29 DIAGNOSIS — E78.2 MIXED HYPERLIPIDEMIA: ICD-10-CM

## 2023-03-29 LAB
ALBUMIN SERPL BCP-MCNC: 4.6 G/DL (ref 3.2–4.9)
ALBUMIN/GLOB SERPL: 1.5 G/DL
ALP SERPL-CCNC: 76 U/L (ref 30–99)
ALT SERPL-CCNC: 17 U/L (ref 2–50)
ANION GAP SERPL CALC-SCNC: 14 MMOL/L (ref 7–16)
AST SERPL-CCNC: 20 U/L (ref 12–45)
BASOPHILS # BLD AUTO: 0.7 % (ref 0–1.8)
BASOPHILS # BLD: 0.06 K/UL (ref 0–0.12)
BILIRUB SERPL-MCNC: 0.7 MG/DL (ref 0.1–1.5)
BUN SERPL-MCNC: 25 MG/DL (ref 8–22)
CALCIUM ALBUM COR SERPL-MCNC: 8.8 MG/DL (ref 8.5–10.5)
CALCIUM SERPL-MCNC: 9.3 MG/DL (ref 8.5–10.5)
CHLORIDE SERPL-SCNC: 109 MMOL/L (ref 96–112)
CHOLEST SERPL-MCNC: 142 MG/DL (ref 100–199)
CO2 SERPL-SCNC: 22 MMOL/L (ref 20–33)
CREAT SERPL-MCNC: 0.65 MG/DL (ref 0.5–1.4)
EOSINOPHIL # BLD AUTO: 0.18 K/UL (ref 0–0.51)
EOSINOPHIL NFR BLD: 2.1 % (ref 0–6.9)
ERYTHROCYTE [DISTWIDTH] IN BLOOD BY AUTOMATED COUNT: 49.1 FL (ref 35.9–50)
GFR SERPLBLD CREATININE-BSD FMLA CKD-EPI: 87 ML/MIN/1.73 M 2
GLOBULIN SER CALC-MCNC: 3 G/DL (ref 1.9–3.5)
GLUCOSE SERPL-MCNC: 95 MG/DL (ref 65–99)
HCT VFR BLD AUTO: 45 % (ref 37–47)
HDLC SERPL-MCNC: 53 MG/DL
HGB BLD-MCNC: 14.6 G/DL (ref 12–16)
IMM GRANULOCYTES # BLD AUTO: 0.03 K/UL (ref 0–0.11)
IMM GRANULOCYTES NFR BLD AUTO: 0.4 % (ref 0–0.9)
LDLC SERPL CALC-MCNC: 69 MG/DL
LYMPHOCYTES # BLD AUTO: 2.54 K/UL (ref 1–4.8)
LYMPHOCYTES NFR BLD: 30.2 % (ref 22–41)
MCH RBC QN AUTO: 31.4 PG (ref 27–33)
MCHC RBC AUTO-ENTMCNC: 32.4 G/DL (ref 33.6–35)
MCV RBC AUTO: 96.8 FL (ref 81.4–97.8)
MONOCYTES # BLD AUTO: 0.74 K/UL (ref 0–0.85)
MONOCYTES NFR BLD AUTO: 8.8 % (ref 0–13.4)
NEUTROPHILS # BLD AUTO: 4.85 K/UL (ref 2–7.15)
NEUTROPHILS NFR BLD: 57.8 % (ref 44–72)
NRBC # BLD AUTO: 0 K/UL
NRBC BLD-RTO: 0 /100 WBC
PLATELET # BLD AUTO: 195 K/UL (ref 164–446)
PMV BLD AUTO: 12.9 FL (ref 9–12.9)
POTASSIUM SERPL-SCNC: 3.5 MMOL/L (ref 3.6–5.5)
PROT SERPL-MCNC: 7.6 G/DL (ref 6–8.2)
RBC # BLD AUTO: 4.65 M/UL (ref 4.2–5.4)
SODIUM SERPL-SCNC: 145 MMOL/L (ref 135–145)
TRIGL SERPL-MCNC: 100 MG/DL (ref 0–149)
TSH SERPL DL<=0.005 MIU/L-ACNC: 1.45 UIU/ML (ref 0.38–5.33)
WBC # BLD AUTO: 8.4 K/UL (ref 4.8–10.8)

## 2023-03-29 PROCEDURE — 80053 COMPREHEN METABOLIC PANEL: CPT

## 2023-03-29 PROCEDURE — 80061 LIPID PANEL: CPT

## 2023-03-29 PROCEDURE — 84443 ASSAY THYROID STIM HORMONE: CPT

## 2023-03-29 PROCEDURE — 85025 COMPLETE CBC W/AUTO DIFF WBC: CPT

## 2023-03-29 PROCEDURE — 36415 COLL VENOUS BLD VENIPUNCTURE: CPT

## 2023-04-30 DIAGNOSIS — E78.5 DYSLIPIDEMIA: ICD-10-CM

## 2023-04-30 DIAGNOSIS — I10 ESSENTIAL HYPERTENSION: ICD-10-CM

## 2023-05-01 RX ORDER — AMLODIPINE BESYLATE 10 MG/1
TABLET ORAL
Qty: 90 TABLET | Refills: 0 | Status: SHIPPED | OUTPATIENT
Start: 2023-05-01 | End: 2023-08-07

## 2023-05-01 RX ORDER — LOSARTAN POTASSIUM 100 MG/1
TABLET ORAL
Qty: 100 TABLET | Refills: 0 | Status: SHIPPED | OUTPATIENT
Start: 2023-05-01 | End: 2023-09-25

## 2023-05-01 RX ORDER — SIMVASTATIN 40 MG
TABLET ORAL
Qty: 100 TABLET | Refills: 0 | Status: SHIPPED | OUTPATIENT
Start: 2023-05-01 | End: 2023-10-10

## 2023-07-19 PROBLEM — I73.9 PERIPHERAL VASCULAR DISEASE, UNSPECIFIED (HCC): Status: ACTIVE | Noted: 2023-07-19

## 2023-08-06 DIAGNOSIS — I10 ESSENTIAL HYPERTENSION: ICD-10-CM

## 2023-08-07 RX ORDER — AMLODIPINE BESYLATE 10 MG/1
TABLET ORAL
Qty: 90 TABLET | Refills: 0 | Status: SHIPPED | OUTPATIENT
Start: 2023-08-07 | End: 2023-09-25

## 2023-08-07 NOTE — TELEPHONE ENCOUNTER
Received request via: Pharmacy    Was the patient seen in the last year in this department? Yes  3/28/2023  Does the patient have an active prescription (recently filled or refills available) for medication(s) requested? No    Does the patient have long term Plus and need 100 day supply (blood pressure, diabetes and cholesterol meds only)? Yes, quantity updated to 100 days

## 2023-09-24 DIAGNOSIS — I10 ESSENTIAL HYPERTENSION: ICD-10-CM

## 2023-09-25 RX ORDER — AMLODIPINE BESYLATE 10 MG/1
TABLET ORAL
Qty: 100 TABLET | Refills: 0 | Status: SHIPPED | OUTPATIENT
Start: 2023-09-25 | End: 2024-02-20

## 2023-09-25 RX ORDER — LOSARTAN POTASSIUM 100 MG/1
TABLET ORAL
Qty: 100 TABLET | Refills: 0 | Status: SHIPPED | OUTPATIENT
Start: 2023-09-25 | End: 2023-09-26 | Stop reason: SDUPTHER

## 2023-09-26 DIAGNOSIS — I10 ESSENTIAL HYPERTENSION: ICD-10-CM

## 2023-09-26 RX ORDER — LOSARTAN POTASSIUM 100 MG/1
100 TABLET ORAL DAILY
Qty: 100 TABLET | Refills: 2 | Status: SHIPPED | OUTPATIENT
Start: 2023-09-26

## 2023-09-28 ENCOUNTER — OFFICE VISIT (OUTPATIENT)
Dept: MEDICAL GROUP | Facility: MEDICAL CENTER | Age: 83
End: 2023-09-28
Payer: MEDICARE

## 2023-09-28 VITALS
SYSTOLIC BLOOD PRESSURE: 110 MMHG | OXYGEN SATURATION: 94 % | DIASTOLIC BLOOD PRESSURE: 60 MMHG | HEIGHT: 63 IN | BODY MASS INDEX: 24.63 KG/M2 | RESPIRATION RATE: 16 BRPM | TEMPERATURE: 98.9 F | HEART RATE: 81 BPM | WEIGHT: 139 LBS

## 2023-09-28 DIAGNOSIS — I10 ESSENTIAL HYPERTENSION: ICD-10-CM

## 2023-09-28 DIAGNOSIS — Z85.118 HISTORY OF LUNG CANCER: ICD-10-CM

## 2023-09-28 DIAGNOSIS — F17.200 TOBACCO DEPENDENCE: ICD-10-CM

## 2023-09-28 DIAGNOSIS — J44.9 CHRONIC OBSTRUCTIVE PULMONARY DISEASE, UNSPECIFIED COPD TYPE (HCC): ICD-10-CM

## 2023-09-28 DIAGNOSIS — E78.5 DYSLIPIDEMIA: ICD-10-CM

## 2023-09-28 PROCEDURE — 3078F DIAST BP <80 MM HG: CPT | Performed by: FAMILY MEDICINE

## 2023-09-28 PROCEDURE — 99214 OFFICE O/P EST MOD 30 MIN: CPT | Performed by: FAMILY MEDICINE

## 2023-09-28 PROCEDURE — 3074F SYST BP LT 130 MM HG: CPT | Performed by: FAMILY MEDICINE

## 2023-09-28 RX ORDER — AMLODIPINE BESYLATE 10 MG/1
10 TABLET ORAL DAILY
Qty: 100 TABLET | Refills: 0 | Status: CANCELLED | OUTPATIENT
Start: 2023-09-28

## 2023-09-28 ASSESSMENT — FIBROSIS 4 INDEX: FIB4 SCORE: 2.06

## 2023-09-28 NOTE — PROGRESS NOTES
CC: Hypertension, hyperlipidemia, COPD, history of lung cancer, tobacco dependence    HPI:   KT presents today to discuss the following medical issues    Essential hypertension  Patient's blood blood pressure has been adequately controlled on losartan 100 mg daily, and amlodipine 10 mg daily.  No side effects     Dyslipidemia  Patient has been tolerating the statin. Denies muscle pain LFTs has been normal.  Patient has been on simvastatin 40 mg daily.  No history of diabetes, CAD, or stroke.     Chronic obstructive pulmonary disease, unspecified COPD type (HCC)/History of lung cancer/Tobacco dependence  She denies any cough or shortness of breath.  Patient has been on albuterol as needed. She did not take it for a while and she has been asymptomatic.  Patient has history of lung cancer in 2017, status post surgical removal.  No chemo or radiation therapy was needed.  Patient smokes about 10 cigarettes a day, she used to smoke a pack a day since she was 12 years old.  Discussed smoking cessation, patient stated that she cut down to 10 cigarettes a day but she cannot quit smoking.    Patient Active Problem List    Diagnosis Date Noted    BMI 24.0-24.9, adult 07/19/2023    Peripheral vascular disease, unspecified (HCC) 07/19/2023    Mixed hyperlipidemia 03/23/2022    Tobacco dependence 09/21/2020    History of lung cancer 09/18/2017    Chronic obstructive pulmonary disease (HCC) 05/01/2017    Osteopenia 05/28/2014    Essential hypertension 03/23/2010    Cataract     Dyslipidemia        Current Outpatient Medications   Medication Sig Dispense Refill    losartan (COZAAR) 100 MG Tab Take 1 Tablet by mouth every day. 100 Tablet 2    amLODIPine (NORVASC) 10 MG Tab Take 1 tablet by mouth once daily 100 Tablet 0    simvastatin (ZOCOR) 40 MG Tab TAKE 1 TABLET BY MOUTH ONCE DAILY IN THE EVENING 100 Tablet 0    non-formulary med Inhale 1 L/min by mouth as needed (shortness of breath). (Patient not taking: Reported on  "7/19/2023)      calcium carbonate (CALCIUM CARBONATE) 500 MG Tab Take 500 mg by mouth 2 times a day, with meals.      vitamin D (CHOLECALCIFEROL) 1000 UNIT Tab Take 1,000 Units by mouth 2 Times a Day.       No current facility-administered medications for this visit.         Allergies as of 09/28/2023 - Reviewed 07/19/2023   Allergen Reaction Noted    Nkda [no known drug allergy]  09/22/2009        ROS: Denies any chest pain, Shortness of breath, Changes bowel or bladder, Lower extremity edema.    Physical Exam:  /60 (BP Location: Right arm, Patient Position: Sitting, BP Cuff Size: Adult)   Pulse 81   Temp 37.2 °C (98.9 °F) (Temporal)   Resp 16   Ht 1.6 m (5' 3\")   Wt 63 kg (139 lb)   SpO2 94%   BMI 24.62 kg/m²   Gen.: Well-developed, well-nourished, no apparent distress,pleasant and cooperative with the examination  Skin:  Warm and dry with good turgor. No rashes or suspicious lesions in visible areas  HEENT:Sinuses nontender with palpation, TMs clear, nares patent with pink mucosa and clear rhinorrhea,no septal deviation ,polyps or lesions. lips without lesions, oropharynx clear.  Neck: Trachea midline,no masses or adenopathy. No JVD.  Cor: Regular rate and rhythm without murmur, gallop or rub.  Lungs: Respirations unlabored.Clear to auscultation with equal breath sounds bilaterally. No wheezes, rhonchi.  Extremities: No cyanosis, clubbing or edema.      Assessment and Plan.   82 y.o. female     1. Essential hypertension  Controlled  Continue on losartan 100 mg daily, and amlodipine 10 mg daily.    2. Dyslipidemia  She has been tolerating the statin. Denies muscle pain LFTs has been normal  Continue on simvastatin 40 mg daily    3. Chronic obstructive pulmonary disease, unspecified COPD type (HCC)  Stable.  Asymptomatic  Continue on albuterol as needed, rarely needs it.  Discussed smoking cessation.    4. History of lung cancer  5. 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  Has been asymptomatic since then.  Smoking cessation discussed, patient is not ready to quit

## 2023-10-10 DIAGNOSIS — E78.5 DYSLIPIDEMIA: ICD-10-CM

## 2023-10-10 RX ORDER — SIMVASTATIN 40 MG
TABLET ORAL
Qty: 100 TABLET | Refills: 0 | Status: SHIPPED | OUTPATIENT
Start: 2023-10-10 | End: 2024-01-09

## 2024-01-09 DIAGNOSIS — E78.5 DYSLIPIDEMIA: ICD-10-CM

## 2024-01-09 RX ORDER — SIMVASTATIN 40 MG
TABLET ORAL
Qty: 100 TABLET | Refills: 0 | Status: SHIPPED | OUTPATIENT
Start: 2024-01-09

## 2024-01-17 ENCOUNTER — HOSPITAL ENCOUNTER (OUTPATIENT)
Dept: LAB | Facility: MEDICAL CENTER | Age: 84
End: 2024-01-17
Attending: FAMILY MEDICINE
Payer: MEDICARE

## 2024-01-17 DIAGNOSIS — I10 ESSENTIAL HYPERTENSION: ICD-10-CM

## 2024-01-17 DIAGNOSIS — E78.5 DYSLIPIDEMIA: ICD-10-CM

## 2024-01-17 LAB
ALBUMIN SERPL BCP-MCNC: 4.4 G/DL (ref 3.2–4.9)
ALBUMIN/GLOB SERPL: 1.6 G/DL
ALP SERPL-CCNC: 83 U/L (ref 30–99)
ALT SERPL-CCNC: 14 U/L (ref 2–50)
ANION GAP SERPL CALC-SCNC: 11 MMOL/L (ref 7–16)
AST SERPL-CCNC: 16 U/L (ref 12–45)
BASOPHILS # BLD AUTO: 0.6 % (ref 0–1.8)
BASOPHILS # BLD: 0.04 K/UL (ref 0–0.12)
BILIRUB SERPL-MCNC: 0.6 MG/DL (ref 0.1–1.5)
BUN SERPL-MCNC: 21 MG/DL (ref 8–22)
CALCIUM ALBUM COR SERPL-MCNC: 8.7 MG/DL (ref 8.5–10.5)
CALCIUM SERPL-MCNC: 9 MG/DL (ref 8.5–10.5)
CHLORIDE SERPL-SCNC: 108 MMOL/L (ref 96–112)
CHOLEST SERPL-MCNC: 139 MG/DL (ref 100–199)
CO2 SERPL-SCNC: 24 MMOL/L (ref 20–33)
CREAT SERPL-MCNC: 0.69 MG/DL (ref 0.5–1.4)
EOSINOPHIL # BLD AUTO: 0.15 K/UL (ref 0–0.51)
EOSINOPHIL NFR BLD: 2.2 % (ref 0–6.9)
ERYTHROCYTE [DISTWIDTH] IN BLOOD BY AUTOMATED COUNT: 48.7 FL (ref 35.9–50)
GFR SERPLBLD CREATININE-BSD FMLA CKD-EPI: 85 ML/MIN/1.73 M 2
GLOBULIN SER CALC-MCNC: 2.7 G/DL (ref 1.9–3.5)
GLUCOSE SERPL-MCNC: 94 MG/DL (ref 65–99)
HCT VFR BLD AUTO: 45.4 % (ref 37–47)
HDLC SERPL-MCNC: 57 MG/DL
HGB BLD-MCNC: 15 G/DL (ref 12–16)
IMM GRANULOCYTES # BLD AUTO: 0.03 K/UL (ref 0–0.11)
IMM GRANULOCYTES NFR BLD AUTO: 0.4 % (ref 0–0.9)
LDLC SERPL CALC-MCNC: 66 MG/DL
LYMPHOCYTES # BLD AUTO: 1.97 K/UL (ref 1–4.8)
LYMPHOCYTES NFR BLD: 28.3 % (ref 22–41)
MCH RBC QN AUTO: 31.3 PG (ref 27–33)
MCHC RBC AUTO-ENTMCNC: 33 G/DL (ref 32.2–35.5)
MCV RBC AUTO: 94.8 FL (ref 81.4–97.8)
MONOCYTES # BLD AUTO: 0.57 K/UL (ref 0–0.85)
MONOCYTES NFR BLD AUTO: 8.2 % (ref 0–13.4)
NEUTROPHILS # BLD AUTO: 4.21 K/UL (ref 1.82–7.42)
NEUTROPHILS NFR BLD: 60.3 % (ref 44–72)
NRBC # BLD AUTO: 0 K/UL
NRBC BLD-RTO: 0 /100 WBC (ref 0–0.2)
PLATELET # BLD AUTO: 187 K/UL (ref 164–446)
PMV BLD AUTO: 12.8 FL (ref 9–12.9)
POTASSIUM SERPL-SCNC: 4.1 MMOL/L (ref 3.6–5.5)
PROT SERPL-MCNC: 7.1 G/DL (ref 6–8.2)
RBC # BLD AUTO: 4.79 M/UL (ref 4.2–5.4)
SODIUM SERPL-SCNC: 143 MMOL/L (ref 135–145)
TRIGL SERPL-MCNC: 78 MG/DL (ref 0–149)
TSH SERPL DL<=0.005 MIU/L-ACNC: 1.35 UIU/ML (ref 0.38–5.33)
WBC # BLD AUTO: 7 K/UL (ref 4.8–10.8)

## 2024-01-17 PROCEDURE — 85025 COMPLETE CBC W/AUTO DIFF WBC: CPT

## 2024-01-17 PROCEDURE — 36415 COLL VENOUS BLD VENIPUNCTURE: CPT

## 2024-01-17 PROCEDURE — 84443 ASSAY THYROID STIM HORMONE: CPT

## 2024-01-17 PROCEDURE — 80053 COMPREHEN METABOLIC PANEL: CPT

## 2024-01-17 PROCEDURE — 80061 LIPID PANEL: CPT

## 2024-01-23 ENCOUNTER — OFFICE VISIT (OUTPATIENT)
Dept: MEDICAL GROUP | Facility: MEDICAL CENTER | Age: 84
End: 2024-01-23
Payer: MEDICARE

## 2024-01-23 VITALS
BODY MASS INDEX: 24.49 KG/M2 | TEMPERATURE: 98.6 F | OXYGEN SATURATION: 94 % | DIASTOLIC BLOOD PRESSURE: 62 MMHG | SYSTOLIC BLOOD PRESSURE: 118 MMHG | WEIGHT: 138.2 LBS | HEIGHT: 63 IN | RESPIRATION RATE: 14 BRPM | HEART RATE: 82 BPM

## 2024-01-23 DIAGNOSIS — Z85.118 HISTORY OF LUNG CANCER: ICD-10-CM

## 2024-01-23 DIAGNOSIS — F17.200 TOBACCO DEPENDENCE: ICD-10-CM

## 2024-01-23 DIAGNOSIS — E78.5 DYSLIPIDEMIA: ICD-10-CM

## 2024-01-23 DIAGNOSIS — I10 ESSENTIAL HYPERTENSION: ICD-10-CM

## 2024-01-23 DIAGNOSIS — J44.9 CHRONIC OBSTRUCTIVE PULMONARY DISEASE, UNSPECIFIED COPD TYPE (HCC): ICD-10-CM

## 2024-01-23 PROCEDURE — 3074F SYST BP LT 130 MM HG: CPT | Performed by: FAMILY MEDICINE

## 2024-01-23 PROCEDURE — 3078F DIAST BP <80 MM HG: CPT | Performed by: FAMILY MEDICINE

## 2024-01-23 PROCEDURE — 99214 OFFICE O/P EST MOD 30 MIN: CPT | Performed by: FAMILY MEDICINE

## 2024-01-23 ASSESSMENT — FIBROSIS 4 INDEX: FIB4 SCORE: 1.92

## 2024-01-23 NOTE — PROGRESS NOTES
CC: COPD, tobacco dependence, history of lung cancer, hypertension, hyperlipidemia    HPI:   KT presents today to discuss the following:      Chronic obstructive pulmonary disease, unspecified COPD type (HCC)/Tobacco dependence  She denies any cough or shortness of breath.  Patient has been on albuterol as needed. She did not take it for a while and she has been.  Denies any cough or shortness of breath.  She smokes about 10 cigarettes a day, she used to smoke a pack a day since she was 12 years old.  Discussed smoking cessation, patient stated that she cut down to 10 cigarettes a day but she cannot quit smoking.      History of lung cancer/tobacco dependence  Patient has history of lung cancer in 2017, status post surgical removal.  No chemo or radiation therapy was needed.she has been on surveillance chest CT.  Last CT was checked in 10/2022 showed recurrence of the cancer. Patient smokes about 10 cigarettes a day, she used to smoke a pack a day since she was 12 years old.  Discussed smoking cessation, patient stated that she cut down to 10 cigarettes a day but she cannot quit smoking.      Essential hypertension  Blood pressure has been adequately controlled on losartan 100 mg daily, and amlodipine 10 mg daily.  No side effects     Dyslipidemia  She has been tolerating the statin. Denies muscle pain LFTs has been normal.  Patient has been on simvastatin 40 mg daily.  No history of diabetes, CAD, or stroke.    Patient Active Problem List    Diagnosis Date Noted    BMI 24.0-24.9, adult 07/19/2023    Peripheral vascular disease, unspecified (HCC) 07/19/2023    Mixed hyperlipidemia 03/23/2022    Tobacco dependence 09/21/2020    History of lung cancer 09/18/2017    Chronic obstructive pulmonary disease (HCC) 05/01/2017    Osteopenia 05/28/2014    Essential hypertension 03/23/2010    Cataract     Dyslipidemia        Current Outpatient Medications   Medication Sig Dispense Refill    simvastatin (ZOCOR) 40 MG Tab TAKE  "1 TABLET BY MOUTH ONCE DAILY IN THE EVENING 100 Tablet 0    losartan (COZAAR) 100 MG Tab Take 1 Tablet by mouth every day. 100 Tablet 2    amLODIPine (NORVASC) 10 MG Tab Take 1 tablet by mouth once daily 100 Tablet 0    calcium carbonate (CALCIUM CARBONATE) 500 MG Tab Take 500 mg by mouth 2 times a day, with meals.      vitamin D (CHOLECALCIFEROL) 1000 UNIT Tab Take 1,000 Units by mouth 2 Times a Day.       No current facility-administered medications for this visit.         Allergies as of 01/23/2024 - Reviewed 01/23/2024   Allergen Reaction Noted    Nkda [no known drug allergy]  09/22/2009        ROS: Denies any chest pain, Shortness of breath, Changes bowel or bladder, Lower extremity edema.    Physical Exam:  /62 (BP Location: Left arm, Patient Position: Sitting, BP Cuff Size: Adult)   Pulse 82   Temp 37 °C (98.6 °F)   Resp 14   Ht 1.6 m (5' 3\")   Wt 62.7 kg (138 lb 3.2 oz)   SpO2 94%   BMI 24.48 kg/m²   Gen.: Well-developed, well-nourished, no apparent distress,pleasant and cooperative with the examination  Skin:  Warm and dry with good turgor. No rashes or suspicious lesions in visible areas  HEENT:Sinuses nontender with palpation, TMs clear, nares patent with pink mucosa and clear rhinorrhea,no septal deviation ,polyps or lesions. lips without lesions, oropharynx clear.  Neck: Trachea midline,no masses or adenopathy. No JVD.  Cor: Regular rate and rhythm without murmur, gallop or rub.  Lungs: Respirations unlabored.Clear to auscultation with equal breath sounds bilaterally. No wheezes, rhonchi.  Extremities: No cyanosis, clubbing or edema.          Assessment and Plan.   83 y.o. female     1. Chronic obstructive pulmonary disease, unspecified COPD type (HCC)  Stable.  Asymptomatic.  Continue on albuterol as needed    2. History of lung cancer  Status postsurgical removal.  No radiation or chemotherapy Therapy.  Currently stable  Will continue chest CT surveillance    - Chest CT    3. Tobacco " dependence  Patient smokes about 10 cigarettes a day, she used to smoke a pack a day since she was 12 years old.  Discussed smoking cessation, patient stated that she cut down to 10 cigarettes a day but she cannot quit smoking.      4. Essential hypertension  Controlled.  Continue losartan.  100 mg daily, and amlodipine 10 mg daily    5. Dyslipidemia  He has been tolerating the statin. Denies muscle pain LFTs has been normal  Continue on simvastatin 40 mg daily.

## 2024-02-19 DIAGNOSIS — I10 ESSENTIAL HYPERTENSION: ICD-10-CM

## 2024-02-20 RX ORDER — AMLODIPINE BESYLATE 10 MG/1
TABLET ORAL
Qty: 100 TABLET | Refills: 0 | Status: SHIPPED | OUTPATIENT
Start: 2024-02-20

## 2024-02-26 ENCOUNTER — HOSPITAL ENCOUNTER (OUTPATIENT)
Dept: RADIOLOGY | Facility: MEDICAL CENTER | Age: 84
End: 2024-02-26
Attending: FAMILY MEDICINE
Payer: MEDICARE

## 2024-02-26 DIAGNOSIS — Z85.118 HISTORY OF LUNG CANCER: ICD-10-CM

## 2024-02-26 DIAGNOSIS — J44.9 CHRONIC OBSTRUCTIVE PULMONARY DISEASE, UNSPECIFIED COPD TYPE (HCC): ICD-10-CM

## 2024-02-26 PROCEDURE — 71250 CT THORAX DX C-: CPT

## 2024-03-15 ENCOUNTER — TELEPHONE (OUTPATIENT)
Dept: HEALTH INFORMATION MANAGEMENT | Facility: OTHER | Age: 84
End: 2024-03-15

## 2024-04-20 DIAGNOSIS — I10 ESSENTIAL HYPERTENSION: ICD-10-CM

## 2024-04-20 DIAGNOSIS — E78.5 DYSLIPIDEMIA: ICD-10-CM

## 2024-04-22 RX ORDER — SIMVASTATIN 40 MG
TABLET ORAL
Qty: 100 TABLET | Refills: 0 | Status: SHIPPED | OUTPATIENT
Start: 2024-04-22

## 2024-04-22 RX ORDER — AMLODIPINE BESYLATE 10 MG/1
TABLET ORAL
Qty: 100 TABLET | Refills: 0 | Status: SHIPPED | OUTPATIENT
Start: 2024-04-22

## 2024-07-18 ENCOUNTER — HOSPITAL ENCOUNTER (OUTPATIENT)
Dept: LAB | Facility: MEDICAL CENTER | Age: 84
End: 2024-07-18
Attending: FAMILY MEDICINE
Payer: MEDICARE

## 2024-07-18 DIAGNOSIS — E78.5 DYSLIPIDEMIA: ICD-10-CM

## 2024-07-18 DIAGNOSIS — I10 ESSENTIAL HYPERTENSION: ICD-10-CM

## 2024-07-18 LAB
ALBUMIN SERPL BCP-MCNC: 4.2 G/DL (ref 3.2–4.9)
ALBUMIN/GLOB SERPL: 1.6 G/DL
ALP SERPL-CCNC: 81 U/L (ref 30–99)
ALT SERPL-CCNC: 10 U/L (ref 2–50)
ANION GAP SERPL CALC-SCNC: 13 MMOL/L (ref 7–16)
AST SERPL-CCNC: 17 U/L (ref 12–45)
BASOPHILS # BLD AUTO: 0.7 % (ref 0–1.8)
BASOPHILS # BLD: 0.05 K/UL (ref 0–0.12)
BILIRUB SERPL-MCNC: 0.5 MG/DL (ref 0.1–1.5)
BUN SERPL-MCNC: 18 MG/DL (ref 8–22)
CALCIUM ALBUM COR SERPL-MCNC: 8.9 MG/DL (ref 8.5–10.5)
CALCIUM SERPL-MCNC: 9.1 MG/DL (ref 8.5–10.5)
CHLORIDE SERPL-SCNC: 108 MMOL/L (ref 96–112)
CHOLEST SERPL-MCNC: 124 MG/DL (ref 100–199)
CO2 SERPL-SCNC: 22 MMOL/L (ref 20–33)
CREAT SERPL-MCNC: 0.79 MG/DL (ref 0.5–1.4)
EOSINOPHIL # BLD AUTO: 0.12 K/UL (ref 0–0.51)
EOSINOPHIL NFR BLD: 1.7 % (ref 0–6.9)
ERYTHROCYTE [DISTWIDTH] IN BLOOD BY AUTOMATED COUNT: 48.5 FL (ref 35.9–50)
GFR SERPLBLD CREATININE-BSD FMLA CKD-EPI: 73 ML/MIN/1.73 M 2
GLOBULIN SER CALC-MCNC: 2.6 G/DL (ref 1.9–3.5)
GLUCOSE SERPL-MCNC: 103 MG/DL (ref 65–99)
HCT VFR BLD AUTO: 44.3 % (ref 37–47)
HDLC SERPL-MCNC: 46 MG/DL
HGB BLD-MCNC: 14.8 G/DL (ref 12–16)
IMM GRANULOCYTES # BLD AUTO: 0.03 K/UL (ref 0–0.11)
IMM GRANULOCYTES NFR BLD AUTO: 0.4 % (ref 0–0.9)
LDLC SERPL CALC-MCNC: 66 MG/DL
LYMPHOCYTES # BLD AUTO: 1.75 K/UL (ref 1–4.8)
LYMPHOCYTES NFR BLD: 24.2 % (ref 22–41)
MCH RBC QN AUTO: 32.1 PG (ref 27–33)
MCHC RBC AUTO-ENTMCNC: 33.4 G/DL (ref 32.2–35.5)
MCV RBC AUTO: 96.1 FL (ref 81.4–97.8)
MONOCYTES # BLD AUTO: 0.56 K/UL (ref 0–0.85)
MONOCYTES NFR BLD AUTO: 7.7 % (ref 0–13.4)
NEUTROPHILS # BLD AUTO: 4.72 K/UL (ref 1.82–7.42)
NEUTROPHILS NFR BLD: 65.3 % (ref 44–72)
NRBC # BLD AUTO: 0 K/UL
NRBC BLD-RTO: 0 /100 WBC (ref 0–0.2)
PLATELET # BLD AUTO: 195 K/UL (ref 164–446)
PMV BLD AUTO: 13.2 FL (ref 9–12.9)
POTASSIUM SERPL-SCNC: 3.8 MMOL/L (ref 3.6–5.5)
PROT SERPL-MCNC: 6.8 G/DL (ref 6–8.2)
RBC # BLD AUTO: 4.61 M/UL (ref 4.2–5.4)
SODIUM SERPL-SCNC: 143 MMOL/L (ref 135–145)
TRIGL SERPL-MCNC: 62 MG/DL (ref 0–149)
WBC # BLD AUTO: 7.2 K/UL (ref 4.8–10.8)

## 2024-07-18 PROCEDURE — 85025 COMPLETE CBC W/AUTO DIFF WBC: CPT

## 2024-07-18 PROCEDURE — 36415 COLL VENOUS BLD VENIPUNCTURE: CPT

## 2024-07-18 PROCEDURE — 80061 LIPID PANEL: CPT

## 2024-07-18 PROCEDURE — 80053 COMPREHEN METABOLIC PANEL: CPT

## 2024-07-23 ENCOUNTER — OFFICE VISIT (OUTPATIENT)
Dept: MEDICAL GROUP | Facility: MEDICAL CENTER | Age: 84
End: 2024-07-23
Payer: MEDICARE

## 2024-07-23 VITALS
DIASTOLIC BLOOD PRESSURE: 60 MMHG | SYSTOLIC BLOOD PRESSURE: 122 MMHG | RESPIRATION RATE: 16 BRPM | HEART RATE: 98 BPM | OXYGEN SATURATION: 94 % | WEIGHT: 136.47 LBS | HEIGHT: 63 IN | BODY MASS INDEX: 24.18 KG/M2

## 2024-07-23 DIAGNOSIS — I10 ESSENTIAL HYPERTENSION: ICD-10-CM

## 2024-07-23 DIAGNOSIS — Z85.118 HISTORY OF LUNG CANCER: ICD-10-CM

## 2024-07-23 DIAGNOSIS — F17.200 TOBACCO DEPENDENCE: ICD-10-CM

## 2024-07-23 DIAGNOSIS — E78.2 MIXED HYPERLIPIDEMIA: ICD-10-CM

## 2024-07-23 DIAGNOSIS — J44.9 CHRONIC OBSTRUCTIVE PULMONARY DISEASE, UNSPECIFIED COPD TYPE (HCC): ICD-10-CM

## 2024-07-23 PROCEDURE — 3074F SYST BP LT 130 MM HG: CPT | Performed by: FAMILY MEDICINE

## 2024-07-23 PROCEDURE — 99214 OFFICE O/P EST MOD 30 MIN: CPT | Performed by: FAMILY MEDICINE

## 2024-07-23 PROCEDURE — 3078F DIAST BP <80 MM HG: CPT | Performed by: FAMILY MEDICINE

## 2024-07-23 ASSESSMENT — FIBROSIS 4 INDEX: FIB4 SCORE: 2.32

## 2024-08-05 ENCOUNTER — PATIENT OUTREACH (OUTPATIENT)
Dept: HEALTH INFORMATION MANAGEMENT | Facility: OTHER | Age: 84
End: 2024-08-05
Payer: MEDICARE

## 2024-08-05 DIAGNOSIS — I10 ESSENTIAL HYPERTENSION: ICD-10-CM

## 2024-08-05 NOTE — PROGRESS NOTES
Community Health Worker Follow-Up    Reason for outreach: CHW called the pt to introduce the PCM program.    CHW Interventions: CHW and the pt discussed the PCM program and the benefits of the program for the pt. Pt stated she was doing good without the services but would like a call back in 6 mo.    Specific Resources Provided:  Housing/Shelter: n/a  Transportation: n/a  Food: n/a  Financial: n/a  Social Supports: n/a  Other: n/a    Plan: Pt declined the program at this time. CHW will follow up in 6 mo.

## 2024-08-19 DIAGNOSIS — E78.5 DYSLIPIDEMIA: ICD-10-CM

## 2024-08-19 RX ORDER — SIMVASTATIN 40 MG
TABLET ORAL
Qty: 100 TABLET | Refills: 0 | Status: SHIPPED | OUTPATIENT
Start: 2024-08-19

## 2024-08-19 NOTE — TELEPHONE ENCOUNTER
Received request via: Pharmacy    Was the patient seen in the last year in this department? Yes    Does the patient have an active prescription (recently filled or refills available) for medication(s) requested? No    [facility]    Does the patient have half-way Plus and need 100-day supply? (This applies to ALL medications) Yes, quantity updated to 100 days

## 2024-08-22 ENCOUNTER — TELEPHONE (OUTPATIENT)
Dept: HEALTH INFORMATION MANAGEMENT | Facility: OTHER | Age: 84
End: 2024-08-22

## 2024-09-10 DIAGNOSIS — E78.5 DYSLIPIDEMIA: ICD-10-CM

## 2024-09-10 DIAGNOSIS — I10 ESSENTIAL HYPERTENSION: ICD-10-CM

## 2024-09-10 RX ORDER — AMLODIPINE BESYLATE 10 MG/1
TABLET ORAL
Qty: 100 TABLET | Refills: 0 | Status: SHIPPED | OUTPATIENT
Start: 2024-09-10

## 2024-09-10 RX ORDER — LOSARTAN POTASSIUM 100 MG/1
100 TABLET ORAL DAILY
Qty: 100 TABLET | Refills: 0 | Status: SHIPPED | OUTPATIENT
Start: 2024-09-10

## 2024-09-10 RX ORDER — SIMVASTATIN 40 MG
TABLET ORAL
Qty: 100 TABLET | Refills: 0 | Status: SHIPPED | OUTPATIENT
Start: 2024-09-10

## 2024-09-10 NOTE — TELEPHONE ENCOUNTER
Received request via: Pharmacy    Was the patient seen in the last year in this department? Yes    Does the patient have an active prescription (recently filled or refills available) for medication(s) requested? No    Pharmacy Name:   To be filled at: [facility] [address] [facility], [place] - [address]              Does the patient have [facility] Plus and need 100-day supply? (This applies to ALL medications) Yes, quantity updated to 100 days

## 2024-12-10 ENCOUNTER — TELEPHONE (OUTPATIENT)
Dept: MEDICAL GROUP | Facility: MEDICAL CENTER | Age: 84
End: 2024-12-10
Payer: MEDICARE

## 2024-12-10 NOTE — TELEPHONE ENCOUNTER
Patient requiring medical release for dental treatment she is planning for dental extraction/alveoloplasty under general anesthesia.      She has history of COPD history of lung cancer status post resection.  Hypertension, hyperlipidemia.      I called and spoke with patient today. Overall patient reports she is at her baseline.  She is able to walk around and do light work/chores without any issues.  She denies history of any blood clots or bleeding disorder.  She had previously underwent lung resection without issues.  She does continue to smoke.  She is not taking any aspirin, ibuprofen, or any herbal supplements.    Patient is optimally medically managed with regard to chronic medical condition.  Patient is average risk for low risk procedure

## 2025-01-02 DIAGNOSIS — I10 ESSENTIAL HYPERTENSION: ICD-10-CM

## 2025-01-02 RX ORDER — LOSARTAN POTASSIUM 100 MG/1
100 TABLET ORAL DAILY
Qty: 100 TABLET | Refills: 0 | Status: SHIPPED | OUTPATIENT
Start: 2025-01-02 | End: 2025-01-23 | Stop reason: SDUPTHER

## 2025-01-02 RX ORDER — AMLODIPINE BESYLATE 10 MG/1
10 TABLET ORAL DAILY
Qty: 100 TABLET | Refills: 0 | Status: SHIPPED | OUTPATIENT
Start: 2025-01-02 | End: 2025-01-23 | Stop reason: SDUPTHER

## 2025-01-02 NOTE — TELEPHONE ENCOUNTER
Received request via: Patient    Was the patient seen in the last year in this department? Yes    Does the patient have an active prescription (recently filled or refills available) for medication(s) requested? No    Pharmacy Name:   [facility] [address] [facility], [place] - [address]  [facility] [address]  Phone: [phone] Fax: [phone]       Does the patient have [facility] Plus and need 100-day supply? (This applies to ALL medications) Yes, quantity updated to 100 days

## 2025-01-02 NOTE — TELEPHONE ENCOUNTER
----- Message from BOBBY LEVY sent at 1/2/2025  1:06 PM PST -----  Regarding: Refills  Hello,    Pt is stating she is out of her BP medications.  She says she takes 2 but for her BP but doesn't know what they are called. Pt was a Dr Ryder pt and will be establishing with Dr Felipe at the end of this month.  She is completely out of her medications.  Pt uses [facility] on [address]. Please contact pt @ [phone] to let her know if these can be filled prior to her appt.     Thank you,  Jennifer :)

## 2025-01-23 ENCOUNTER — OFFICE VISIT (OUTPATIENT)
Dept: MEDICAL GROUP | Facility: MEDICAL CENTER | Age: 85
End: 2025-01-23
Payer: MEDICARE

## 2025-01-23 VITALS
BODY MASS INDEX: 23.04 KG/M2 | SYSTOLIC BLOOD PRESSURE: 124 MMHG | WEIGHT: 130 LBS | OXYGEN SATURATION: 95 % | HEIGHT: 63 IN | RESPIRATION RATE: 16 BRPM | DIASTOLIC BLOOD PRESSURE: 72 MMHG | TEMPERATURE: 97.5 F | HEART RATE: 64 BPM

## 2025-01-23 DIAGNOSIS — I10 ESSENTIAL HYPERTENSION: ICD-10-CM

## 2025-01-23 DIAGNOSIS — E78.5 DYSLIPIDEMIA: ICD-10-CM

## 2025-01-23 DIAGNOSIS — M85.80 OSTEOPENIA, UNSPECIFIED LOCATION: ICD-10-CM

## 2025-01-23 DIAGNOSIS — Z85.118 HISTORY OF LUNG CANCER: ICD-10-CM

## 2025-01-23 DIAGNOSIS — Z78.0 POST-MENOPAUSAL: ICD-10-CM

## 2025-01-23 DIAGNOSIS — J44.9 CHRONIC OBSTRUCTIVE PULMONARY DISEASE, UNSPECIFIED COPD TYPE (HCC): ICD-10-CM

## 2025-01-23 PROCEDURE — 99214 OFFICE O/P EST MOD 30 MIN: CPT | Performed by: STUDENT IN AN ORGANIZED HEALTH CARE EDUCATION/TRAINING PROGRAM

## 2025-01-23 PROCEDURE — 3078F DIAST BP <80 MM HG: CPT | Performed by: STUDENT IN AN ORGANIZED HEALTH CARE EDUCATION/TRAINING PROGRAM

## 2025-01-23 PROCEDURE — 3074F SYST BP LT 130 MM HG: CPT | Performed by: STUDENT IN AN ORGANIZED HEALTH CARE EDUCATION/TRAINING PROGRAM

## 2025-01-23 RX ORDER — LOSARTAN POTASSIUM 100 MG/1
100 TABLET ORAL DAILY
Qty: 100 TABLET | Refills: 4 | Status: SHIPPED | OUTPATIENT
Start: 2025-01-23

## 2025-01-23 RX ORDER — SIMVASTATIN 40 MG
40 TABLET ORAL EVERY EVENING
Qty: 100 TABLET | Refills: 4 | Status: SHIPPED | OUTPATIENT
Start: 2025-01-23

## 2025-01-23 RX ORDER — AMLODIPINE BESYLATE 10 MG/1
10 TABLET ORAL DAILY
Qty: 100 TABLET | Refills: 4 | Status: SHIPPED | OUTPATIENT
Start: 2025-01-23

## 2025-01-23 ASSESSMENT — ENCOUNTER SYMPTOMS
FEVER: 0
WHEEZING: 0
NAUSEA: 0
DIZZINESS: 0
VOMITING: 0
SHORTNESS OF BREATH: 0
CHILLS: 0
WEIGHT LOSS: 0
HEADACHES: 0
PALPITATIONS: 0

## 2025-01-23 ASSESSMENT — PATIENT HEALTH QUESTIONNAIRE - PHQ9: CLINICAL INTERPRETATION OF PHQ2 SCORE: 0

## 2025-01-23 ASSESSMENT — FIBROSIS 4 INDEX: FIB4 SCORE: 2.34

## 2025-01-23 NOTE — PROGRESS NOTES
Subjective:     CC:     HPI:   KT presents today with     Former patient of Dr. Payan  [facility] HTN, HLD, COPD, history of RLL Invasive adenocarcinoma 2017 status post resection- annual CT surveillance, tobacco use  Specialist  Lung cancer    Verbal consent was acquired by the patient to use [employer] Referrals ambient listening note generation during this visit Yes   History of Present Illness  The patient is an 84-year-old female who presents for evaluation of hypertension, hyperlipidemia, COPD, lung cancer, and osteopenia.    She has a history of hypertension and is currently on amlodipine and losartan, both of which are filled at [facility]. She recently refilled her medications last week and is requesting a refill for the next 3 months.    She also has a history of hyperlipidemia and is on simvastatin. She recently refilled her medications last week and is requesting a refill for the next 3 months.    She has a history of COPD but reports no current respiratory concern.     She was diagnosed with lung cancer in 2017, which was surgically excised.  She has a monitor with annual CT    She has a history of osteopenia and has been on a weekly medication regimen for the past 2 years, which has resulted in a 75% improvement in her condition. She also takes calcium supplements and vitamin D.    Supplemental Information  She has lost 8 pounds since having her teeth pulled and eats a lot of soup.    SOCIAL HISTORY  She does not smoke.    MEDICATIONS  Amlodipine, losartan, simvastatin, vitamin D, calcium supplements        Health Maintenance:     ROS:  Review of Systems   Constitutional:  Negative for chills, fever and weight loss.   HENT:  Negative for hearing loss.    Respiratory:  Negative for shortness of breath and wheezing.    Cardiovascular:  Negative for chest pain and palpitations.   Gastrointestinal:  Negative for nausea and vomiting.   Genitourinary:  Negative for frequency and urgency.   Skin:  Negative for rash.  "  Neurological:  Negative for dizziness and headaches.       Objective:     Exam:  /72   Pulse 64   Temp 36.4 °C (97.5 °F) (Temporal)   Resp 16   Ht 1.6 m (5' 3\")   Wt 59 kg (130 lb)   SpO2 95%   BMI 23.03 kg/m²  Body mass index is 23.03 kg/m².    Physical Exam  Constitutional:       Appearance: Normal appearance.   Cardiovascular:      Rate and Rhythm: Normal rate and regular rhythm.      Heart sounds: No murmur heard.  Pulmonary:      Effort: Pulmonary effort is normal.      Breath sounds: Normal breath sounds. No wheezing.   Musculoskeletal:      Cervical back: Normal range of motion and neck supple.   Lymphadenopathy:      Cervical: No cervical adenopathy.   Neurological:      Mental Status: She is alert.             Labs:     Assessment & Plan:     84 y.o. female with the following -     1. Essential hypertension  Chronic, stable well-controlled on current medication amlodipine 10 mg, losartan 100 mg daily taking without any issues  - amLODIPine (NORVASC) 10 MG Tab; Take 1 Tablet by mouth every day.  Dispense: 100 Tablet; Refill: 4  - losartan (COZAAR) 100 MG Tab; Take 1 Tablet by mouth every day.  Dispense: 100 Tablet; Refill: 4    2. Dyslipidemia  Chronic, stable on simvastatin 40 mg daily taking without any issues  - simvastatin (ZOCOR) 40 MG Tab; Take 1 Tablet by mouth every evening.  Dispense: 100 Tablet; Refill: 4    3. History of lung cancer  History of lung cancer status post resection undergoes CT surveillance annually  - CT-CHEST (THORAX) W/O; Future    4. Osteopenia, unspecified location  Chronic, stable  History of osteopenia previously had been on bisphosphonate.  Will repeat DEXA scan given been 10 years  - DS-BONE DENSITY STUDY (DEXA); Future    5. Post-menopausal    - DS-BONE DENSITY STUDY (DEXA); Future      HCC Gap Form    Diagnosis to address: J44.9 - Chronic obstructive pulmonary disease, unspecified COPD type (HCC)  Assessment and plan: Chronic, stable. Continue with current " defined treatment plan: Overall asymptomatic per patient.  Continues to smoke. Follow-up at least annually.  Last edited 01/23/25 13:58 PST by Matteo Felipe M.D.             Return in about 6 months (around 7/23/2025) for Lab review, Med check.    Please note that this dictation was created using voice recognition software. I have made every reasonable attempt to correct obvious errors, but I expect that there are errors of grammar and possibly content that I did not discover before finalizing the note.

## 2025-02-10 ENCOUNTER — PATIENT OUTREACH (OUTPATIENT)
Dept: HEALTH INFORMATION MANAGEMENT | Facility: OTHER | Age: 85
End: 2025-02-10
Payer: MEDICARE

## 2025-02-10 DIAGNOSIS — I10 ESSENTIAL HYPERTENSION: ICD-10-CM

## 2025-02-17 ENCOUNTER — TELEPHONE (OUTPATIENT)
Dept: HEALTH INFORMATION MANAGEMENT | Facility: OTHER | Age: 85
End: 2025-02-17
Payer: MEDICARE

## 2025-02-26 ENCOUNTER — RESULTS FOLLOW-UP (OUTPATIENT)
Dept: MEDICAL GROUP | Facility: MEDICAL CENTER | Age: 85
End: 2025-02-26
Payer: MEDICARE

## 2025-02-26 ENCOUNTER — HOSPITAL ENCOUNTER (OUTPATIENT)
Dept: RADIOLOGY | Facility: MEDICAL CENTER | Age: 85
End: 2025-02-26
Attending: STUDENT IN AN ORGANIZED HEALTH CARE EDUCATION/TRAINING PROGRAM
Payer: MEDICARE

## 2025-02-26 DIAGNOSIS — Z85.118 HISTORY OF LUNG CANCER: ICD-10-CM

## 2025-02-26 DIAGNOSIS — Z78.0 POST-MENOPAUSAL: ICD-10-CM

## 2025-02-26 DIAGNOSIS — M85.80 OSTEOPENIA, UNSPECIFIED LOCATION: ICD-10-CM

## 2025-02-26 PROCEDURE — 71250 CT THORAX DX C-: CPT

## 2025-02-26 PROCEDURE — 77080 DXA BONE DENSITY AXIAL: CPT

## 2025-02-27 ENCOUNTER — HOSPITAL ENCOUNTER (OUTPATIENT)
Dept: LAB | Facility: MEDICAL CENTER | Age: 85
End: 2025-02-27
Attending: STUDENT IN AN ORGANIZED HEALTH CARE EDUCATION/TRAINING PROGRAM
Payer: MEDICARE

## 2025-02-27 DIAGNOSIS — I10 ESSENTIAL HYPERTENSION: ICD-10-CM

## 2025-02-27 DIAGNOSIS — M85.80 OSTEOPENIA, UNSPECIFIED LOCATION: ICD-10-CM

## 2025-02-27 DIAGNOSIS — E78.5 DYSLIPIDEMIA: ICD-10-CM

## 2025-02-27 LAB
25(OH)D3 SERPL-MCNC: 56 NG/ML (ref 30–100)
ALBUMIN SERPL BCP-MCNC: 4.5 G/DL (ref 3.2–4.9)
ALBUMIN/GLOB SERPL: 1.6 G/DL
ALP SERPL-CCNC: 85 U/L (ref 30–99)
ALT SERPL-CCNC: 22 U/L (ref 2–50)
ANION GAP SERPL CALC-SCNC: 13 MMOL/L (ref 7–16)
AST SERPL-CCNC: 26 U/L (ref 12–45)
BILIRUB SERPL-MCNC: 0.8 MG/DL (ref 0.1–1.5)
BUN SERPL-MCNC: 19 MG/DL (ref 8–22)
CALCIUM ALBUM COR SERPL-MCNC: 8.8 MG/DL (ref 8.5–10.5)
CALCIUM SERPL-MCNC: 9.2 MG/DL (ref 8.5–10.5)
CHLORIDE SERPL-SCNC: 107 MMOL/L (ref 96–112)
CHOLEST SERPL-MCNC: 149 MG/DL (ref 100–199)
CO2 SERPL-SCNC: 23 MMOL/L (ref 20–33)
CREAT SERPL-MCNC: 0.74 MG/DL (ref 0.5–1.4)
ERYTHROCYTE [DISTWIDTH] IN BLOOD BY AUTOMATED COUNT: 47.4 FL (ref 35.9–50)
FASTING STATUS PATIENT QL REPORTED: NORMAL
GFR SERPLBLD CREATININE-BSD FMLA CKD-EPI: 79 ML/MIN/1.73 M 2
GLOBULIN SER CALC-MCNC: 2.9 G/DL (ref 1.9–3.5)
GLUCOSE SERPL-MCNC: 97 MG/DL (ref 65–99)
HCT VFR BLD AUTO: 45.6 % (ref 37–47)
HDLC SERPL-MCNC: 58 MG/DL
HGB BLD-MCNC: 15.4 G/DL (ref 12–16)
LDLC SERPL CALC-MCNC: 68 MG/DL
MCH RBC QN AUTO: 32.2 PG (ref 27–33)
MCHC RBC AUTO-ENTMCNC: 33.8 G/DL (ref 32.2–35.5)
MCV RBC AUTO: 95.2 FL (ref 81.4–97.8)
PLATELET # BLD AUTO: 201 K/UL (ref 164–446)
PMV BLD AUTO: 12.3 FL (ref 9–12.9)
POTASSIUM SERPL-SCNC: 4.1 MMOL/L (ref 3.6–5.5)
PROT SERPL-MCNC: 7.4 G/DL (ref 6–8.2)
RBC # BLD AUTO: 4.79 M/UL (ref 4.2–5.4)
SODIUM SERPL-SCNC: 143 MMOL/L (ref 135–145)
TRIGL SERPL-MCNC: 116 MG/DL (ref 0–149)
WBC # BLD AUTO: 7.7 K/UL (ref 4.8–10.8)

## 2025-02-27 PROCEDURE — 36415 COLL VENOUS BLD VENIPUNCTURE: CPT

## 2025-02-27 PROCEDURE — 80061 LIPID PANEL: CPT

## 2025-02-27 PROCEDURE — 80053 COMPREHEN METABOLIC PANEL: CPT

## 2025-02-27 PROCEDURE — 85027 COMPLETE CBC AUTOMATED: CPT

## 2025-02-27 PROCEDURE — 82306 VITAMIN D 25 HYDROXY: CPT

## 2025-07-23 ENCOUNTER — OFFICE VISIT (OUTPATIENT)
Dept: MEDICAL GROUP | Facility: MEDICAL CENTER | Age: 85
End: 2025-07-23
Payer: MEDICARE

## 2025-07-23 VITALS
HEIGHT: 64 IN | RESPIRATION RATE: 16 BRPM | SYSTOLIC BLOOD PRESSURE: 118 MMHG | OXYGEN SATURATION: 95 % | BODY MASS INDEX: 23.15 KG/M2 | TEMPERATURE: 97.9 F | HEART RATE: 70 BPM | DIASTOLIC BLOOD PRESSURE: 72 MMHG | WEIGHT: 135.58 LBS

## 2025-07-23 DIAGNOSIS — E78.5 DYSLIPIDEMIA: ICD-10-CM

## 2025-07-23 DIAGNOSIS — R91.1 PULMONARY NODULE, LEFT: ICD-10-CM

## 2025-07-23 DIAGNOSIS — M81.0 OSTEOPOROSIS OF FEMUR WITHOUT PATHOLOGICAL FRACTURE: Primary | ICD-10-CM

## 2025-07-23 DIAGNOSIS — F17.200 TOBACCO DEPENDENCE: ICD-10-CM

## 2025-07-23 DIAGNOSIS — I10 ESSENTIAL HYPERTENSION: ICD-10-CM

## 2025-07-23 DIAGNOSIS — Z85.118 HISTORY OF LUNG CANCER: ICD-10-CM

## 2025-07-23 DIAGNOSIS — L82.1 SK (SEBORRHEIC KERATOSIS): ICD-10-CM

## 2025-07-23 PROCEDURE — 3078F DIAST BP <80 MM HG: CPT | Performed by: STUDENT IN AN ORGANIZED HEALTH CARE EDUCATION/TRAINING PROGRAM

## 2025-07-23 PROCEDURE — 99214 OFFICE O/P EST MOD 30 MIN: CPT | Performed by: STUDENT IN AN ORGANIZED HEALTH CARE EDUCATION/TRAINING PROGRAM

## 2025-07-23 PROCEDURE — 3074F SYST BP LT 130 MM HG: CPT | Performed by: STUDENT IN AN ORGANIZED HEALTH CARE EDUCATION/TRAINING PROGRAM

## 2025-07-23 RX ORDER — ALENDRONATE SODIUM 70 MG/1
70 TABLET ORAL
Qty: 12 TABLET | Refills: 3 | Status: SHIPPED | OUTPATIENT
Start: 2025-07-23

## 2025-07-23 RX ORDER — ALENDRONATE SODIUM 70 MG/1
70 TABLET ORAL
Qty: 12 TABLET | Refills: 0 | Status: SHIPPED | OUTPATIENT
Start: 2025-07-23 | End: 2025-07-23

## 2025-07-23 ASSESSMENT — ENCOUNTER SYMPTOMS
NAUSEA: 0
WEIGHT LOSS: 0
FEVER: 0
DIZZINESS: 0
PALPITATIONS: 0
HEADACHES: 0
WHEEZING: 0
CHILLS: 0
VOMITING: 0
SHORTNESS OF BREATH: 0

## 2025-07-23 ASSESSMENT — FIBROSIS 4 INDEX: FIB4 SCORE: 2.34

## 2025-07-23 NOTE — PROGRESS NOTES
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    She has been diagnosed with osteoporosis in her femur and osteopenia in her back. She reports taking alendronate from 2012 to 2014, which significantly improved her bone density. She has been tolerating the medication without any issues. Additionally, she takes calcium supplements and consumes Lactaid 2 percent milk.    She has a history of right lower lobe lung cancer. A recent CT scan in 02/2025 showed no suspicious changes, with a stable spot on the left upper lobe that has not changed since 2024. She continues to smoke cigarettes, a habit she has maintained since the age of 14.    She reports arthritis in her back and hands, causing intermittent pain. Despite this, she maintains an active lifestyle and takes vitamin D and calcium.    She has noticed some skin changes, including warts and white spots, but does not wish to consult a dermatologist at this time. She also mentions experiencing bruising on her hands, which she attributes to minor knocks.    Social History:  Diet: Consumes Lactaid 2 percent milk  Tobacco: Smokes cigarettes    PAST SURGICAL HISTORY:  History of being on alendronate from 2012 to 2014.    FAMILY HISTORY  The patient's father had a heart attack at the age of 84.        Health Maintenance:     ROS:  Review of Systems   Constitutional:  Negative for chills, fever and weight loss.   HENT:  Negative for hearing loss.    Respiratory:  Negative for shortness of breath and wheezing.    Cardiovascular:  Negative for chest pain and palpitations.  "  Gastrointestinal:  Negative for nausea and vomiting.   Genitourinary:  Negative for frequency and urgency.   Skin:  Negative for rash.   Neurological:  Negative for dizziness and headaches.       Objective:     Exam:  /72 (BP Location: Left arm, Patient Position: Sitting, BP Cuff Size: Adult)   Pulse 70   Temp 36.6 °C (97.9 °F) (Temporal)   Resp 16   Ht 1.626 m (5' 4\") Comment: pt reported  Wt 61.5 kg (135 lb 9.3 oz)   SpO2 95%   BMI 23.27 kg/m²  Body mass index is 23.27 kg/m².    Physical Exam  Constitutional:       Appearance: Normal appearance.   Cardiovascular:      Rate and Rhythm: Normal rate and regular rhythm.      Heart sounds: No murmur heard.  Pulmonary:      Effort: Pulmonary effort is normal.      Breath sounds: Normal breath sounds. No wheezing.   Musculoskeletal:      Cervical back: Normal range of motion and neck supple.   Neurological:      Mental Status: She is alert.         Labs:   Results  Labs   - Blood count: 2025, Normal   - Electrolyte levels: 2025, Normal   - Kidney function: 2025, Good   - Liver enzymes: 2025, Not elevated   - LDL cholesterol: 2025, Less than 100   - Vitamin D levels: 2025, Sufficient    Imaging   - CT scan of right lower lobe lun2025, No suspicious findings, stable spot on the left side that has not changed since     Diagnostic Testing   - Bone density test: 2025, Osteoporosis in the femur and osteopenia in the back        Assessment & Plan:     84 y.o. female with the following -     1. 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  Dispense: " 12 Tablet; Refill: 3    2. History of lung cancer  CT scan reviewed as above May consider continued annual CT scan in the setting of continued use of tobacco    3. Dyslipidemia  Chronic, stable on simvastatin 40 mg  LDL less than 100    4. Tobacco dependence  Not interested in quitting    5. Pulmonary nodule, left  Stable 2 mm left upper lobe nodule    6. SK (seborrheic keratosis)  Patient has multiple SK on bilateral arm require history of sun exposure he declined dermatology referral at this time    7. Essential hypertension  Blood pressure well-controlled systolic blood pressure 118 today in clinic denies chest pain short of breath disinsertion  - CBC WITHOUT DIFFERENTIAL; Future  - TSH WITH REFLEX TO FT4; Future  - Comp Metabolic Panel; Future          Return in about 4 months (around 11/23/2025) for Lab review, Med check.    Please note that this dictation was created using voice recognition software. I have made every reasonable attempt to correct obvious errors, but I expect that there are errors of grammar and possibly content that I did not discover before finalizing the note.

## 2025-07-24 ENCOUNTER — HOSPITAL ENCOUNTER (OUTPATIENT)
Dept: LAB | Facility: MEDICAL CENTER | Age: 85
End: 2025-07-24
Attending: STUDENT IN AN ORGANIZED HEALTH CARE EDUCATION/TRAINING PROGRAM
Payer: MEDICARE

## 2025-07-24 DIAGNOSIS — I10 ESSENTIAL HYPERTENSION: ICD-10-CM

## 2025-07-24 LAB
ALBUMIN SERPL BCP-MCNC: 4.5 G/DL (ref 3.2–4.9)
ALBUMIN/GLOB SERPL: 1.8 G/DL
ALP SERPL-CCNC: 80 U/L (ref 30–99)
ALT SERPL-CCNC: 20 U/L (ref 2–50)
ANION GAP SERPL CALC-SCNC: 11 MMOL/L (ref 7–16)
AST SERPL-CCNC: 24 U/L (ref 12–45)
BILIRUB SERPL-MCNC: 0.8 MG/DL (ref 0.1–1.5)
BUN SERPL-MCNC: 24 MG/DL (ref 8–22)
CALCIUM ALBUM COR SERPL-MCNC: 8.7 MG/DL (ref 8.5–10.5)
CALCIUM SERPL-MCNC: 9.1 MG/DL (ref 8.5–10.5)
CHLORIDE SERPL-SCNC: 111 MMOL/L (ref 96–112)
CO2 SERPL-SCNC: 20 MMOL/L (ref 20–33)
CREAT SERPL-MCNC: 0.83 MG/DL (ref 0.5–1.4)
ERYTHROCYTE [DISTWIDTH] IN BLOOD BY AUTOMATED COUNT: 47.8 FL (ref 35.9–50)
GFR SERPLBLD CREATININE-BSD FMLA CKD-EPI: 69 ML/MIN/1.73 M 2
GLOBULIN SER CALC-MCNC: 2.5 G/DL (ref 1.9–3.5)
GLUCOSE SERPL-MCNC: 93 MG/DL (ref 65–99)
HCT VFR BLD AUTO: 43.9 % (ref 37–47)
HGB BLD-MCNC: 14.7 G/DL (ref 12–16)
MCH RBC QN AUTO: 31.5 PG (ref 27–33)
MCHC RBC AUTO-ENTMCNC: 33.5 G/DL (ref 32.2–35.5)
MCV RBC AUTO: 94 FL (ref 81.4–97.8)
PLATELET # BLD AUTO: 186 K/UL (ref 164–446)
PMV BLD AUTO: 13 FL (ref 9–12.9)
POTASSIUM SERPL-SCNC: 3.9 MMOL/L (ref 3.6–5.5)
PROT SERPL-MCNC: 7 G/DL (ref 6–8.2)
RBC # BLD AUTO: 4.67 M/UL (ref 4.2–5.4)
SODIUM SERPL-SCNC: 142 MMOL/L (ref 135–145)
TSH SERPL DL<=0.005 MIU/L-ACNC: 1.17 UIU/ML (ref 0.38–5.33)
WBC # BLD AUTO: 8.2 K/UL (ref 4.8–10.8)

## 2025-07-24 PROCEDURE — 85027 COMPLETE CBC AUTOMATED: CPT

## 2025-07-24 PROCEDURE — 80053 COMPREHEN METABOLIC PANEL: CPT

## 2025-07-24 PROCEDURE — 84443 ASSAY THYROID STIM HORMONE: CPT

## 2025-07-24 PROCEDURE — 36415 COLL VENOUS BLD VENIPUNCTURE: CPT

## (undated) DEVICE — PACK LAP CHOLE OR - (2EA/CA)

## (undated) DEVICE — TUBE ENDOBRONCHEAL 37FR - (1/BX)

## (undated) DEVICE — SUTURE 2-0 VICRYL PLUS CT-2 - 27 INCH (36/BX)

## (undated) DEVICE — CLIP MED LG INTNL HRZN TI ESCP - (20/BX)

## (undated) DEVICE — GLOVE SZ 7.5 BIOGEL PI MICRO - PF LF (50PR/BX)

## (undated) DEVICE — SET EXTENSION WITH 2 PORTS (48EA/CA) ***PART #2C8610 IS A SUBSTITUTE*****

## (undated) DEVICE — DRAPE CHEST/BREAST - (12EA/CA)

## (undated) DEVICE — SET LEADWIRE 5 LEAD BEDSIDE DISPOSABLE ECG (1SET OF 5/EA)

## (undated) DEVICE — CHLORAPREP 26 ML APPLICATOR - ORANGE TINT(25/CA)

## (undated) DEVICE — DRESSING TRANSPARENT FILM TEGADERM 4 X 4.75" (50EA/BX)"

## (undated) DEVICE — SET SUCTION/IRRIGATION WITH DISPOSABLE TIP (6/CA )PART #0250-070-520 IS A SUB

## (undated) DEVICE — STAPLER POWERED 45MM (3EA/BX)

## (undated) DEVICE — NEPTUNE 4 PORT MANIFOLD - (20/PK)

## (undated) DEVICE — SUTURE 0 SILK CT-1 (36PK/BX)

## (undated) DEVICE — STAPLE 45MM BLUE 3.5MM ECHELON (12EA/BX)

## (undated) DEVICE — SUTURE GENERAL

## (undated) DEVICE — DRAPE IOBAN II INCISE 23X17 - (10EA/BX 4BX/CA)

## (undated) DEVICE — DRESSING NON-ADHERING 8 X 3 - (50/BX)

## (undated) DEVICE — CANISTER SUCTION 3000ML MECHANICAL FILTER AUTO SHUTOFF MEDI-VAC NONSTERILE LF DISP  (40EA/CA)

## (undated) DEVICE — PROTECTOR ULNA NERVE - (36PR/CA)

## (undated) DEVICE — MASK ANESTHESIA ADULT  - (100/CA)

## (undated) DEVICE — HEAD HOLDER JUNIOR/ADULT

## (undated) DEVICE — CONNECTOR Y TBG CRTY 5 IN 1 STERILE (50EA/CA)

## (undated) DEVICE — STAPLE 45MM GRAY 2.0MM (12EA/BX)

## (undated) DEVICE — DRAIN CHEST ADULT (6EA/CA) DELETED ITEM  ORDER #15909

## (undated) DEVICE — STAPLE 45MM GOLD 3.8MM ECHELN (12EA/BX) WAS ECR45D

## (undated) DEVICE — SOD. CHL. INJ. 0.9% 1000 ML - (14EA/CA 60CA/PF)

## (undated) DEVICE — SODIUM CHL IRRIGATION 0.9% 1000ML (12EA/CA)

## (undated) DEVICE — BAG RETRIEVAL LARGE 10EA/BX

## (undated) DEVICE — SENSOR SPO2 NEO LNCS ADHESIVE (20/BX) SEE USER NOTES

## (undated) DEVICE — TROCARCANN&SEAL 5X55 ZTHREAD - 12/BX

## (undated) DEVICE — DRAPESURG STERI-DRAPE LONG - (10/BX 4BX/CA)

## (undated) DEVICE — CONNECTOR HUBLESS DRAINAGE - ONE WAY (20/BX)

## (undated) DEVICE — SUCTION INSTRUMENT YANKAUER BULBOUS TIP W/O VENT (50EA/CA)

## (undated) DEVICE — LACTATED RINGERS INJ 1000 ML - (14EA/CA 60CA/PF)

## (undated) DEVICE — SPONGE DRAIN 4 X 4IN 6-PLY - (2/PK25PK/BX12BX/CS)

## (undated) DEVICE — TUBING CLEARLINK DUO-VENT - C-FLO (48EA/CA)

## (undated) DEVICE — KIT ANESTHESIA W/CIRCUIT & 3/LT BAG W/FILTER (20EA/CA)

## (undated) DEVICE — Device

## (undated) DEVICE — SPONGE GAUZESTER. 2X2 4-PL - (2/PK 50PK/BX 30BX/CS)

## (undated) DEVICE — ELECTRODE DUAL RETURN W/ CORD - (50/PK)

## (undated) DEVICE — TROCAR 12MM THORACOPORT (6EA/BX)

## (undated) DEVICE — STAPLE 45MM WHTE 2.5MM - (12/BX)

## (undated) DEVICE — KIT ROOM DECONTAMINATION

## (undated) DEVICE — GLOVE BIOGEL INDICATOR SZ 8 SURGICAL PF LTX - (50/BX 4BX/CA)

## (undated) DEVICE — SUTURE 4-0 VICRYL PLUS FS-2 - 27 INCH (36/BX)

## (undated) DEVICE — TROCAR SEPARATOR 5X55 - 6/BX

## (undated) DEVICE — SLEEVE, VASO, THIGH, MED

## (undated) DEVICE — GLOVE BIOGEL M SZ 8 SURGICAL PF LTX - (50/BX 4BX/CA)

## (undated) DEVICE — GLOVE BIOGEL SZ 7.5 SURGICAL PF LTX - (50PR/BX 4BX/CA)

## (undated) DEVICE — ELECTRODE 850 FOAM ADHESIVE - HYDROGEL RADIOTRNSPRNT (50/PK)